# Patient Record
Sex: MALE | Race: WHITE | Employment: FULL TIME | URBAN - METROPOLITAN AREA
[De-identification: names, ages, dates, MRNs, and addresses within clinical notes are randomized per-mention and may not be internally consistent; named-entity substitution may affect disease eponyms.]

---

## 2019-02-04 ENCOUNTER — HOSPITAL ENCOUNTER (OUTPATIENT)
Dept: PREADMISSION TESTING | Age: 64
Discharge: HOME | End: 2019-02-04
Payer: COMMERCIAL

## 2019-02-04 ENCOUNTER — HOSPITAL ENCOUNTER (OUTPATIENT)
Dept: GENERAL RADIOLOGY | Age: 64
Discharge: HOME | End: 2019-02-04
Attending: ORTHOPAEDIC SURGERY
Payer: COMMERCIAL

## 2019-02-04 ENCOUNTER — HOSPITAL ENCOUNTER (OUTPATIENT)
Dept: PHYSICAL THERAPY | Age: 64
Discharge: HOME | End: 2019-02-04
Payer: COMMERCIAL

## 2019-02-04 VITALS
BODY MASS INDEX: 29.41 KG/M2 | WEIGHT: 183 LBS | SYSTOLIC BLOOD PRESSURE: 147 MMHG | DIASTOLIC BLOOD PRESSURE: 83 MMHG | RESPIRATION RATE: 18 BRPM | HEIGHT: 66 IN | TEMPERATURE: 98.2 F | HEART RATE: 81 BPM | OXYGEN SATURATION: 96 %

## 2019-02-04 LAB
ABO + RH BLD: NORMAL
ALBUMIN SERPL-MCNC: 4 G/DL (ref 3.5–5)
ALBUMIN/GLOB SERPL: 0.9 {RATIO} (ref 1.1–2.2)
ALP SERPL-CCNC: 116 U/L (ref 45–117)
ALT SERPL-CCNC: 19 U/L (ref 12–78)
ANION GAP SERPL CALC-SCNC: 6 MMOL/L (ref 5–15)
APPEARANCE UR: CLEAR
AST SERPL-CCNC: 13 U/L (ref 15–37)
ATRIAL RATE: 73 BPM
BACTERIA URNS QL MICRO: NEGATIVE /HPF
BILIRUB SERPL-MCNC: 0.5 MG/DL (ref 0.2–1)
BILIRUB UR QL: NEGATIVE
BLOOD GROUP ANTIBODIES SERPL: NORMAL
BUN SERPL-MCNC: 21 MG/DL (ref 6–20)
BUN/CREAT SERPL: 19 (ref 12–20)
CALCIUM SERPL-MCNC: 9.1 MG/DL (ref 8.5–10.1)
CALCULATED P AXIS, ECG09: 53 DEGREES
CALCULATED R AXIS, ECG10: 24 DEGREES
CALCULATED T AXIS, ECG11: 34 DEGREES
CHLORIDE SERPL-SCNC: 105 MMOL/L (ref 97–108)
CO2 SERPL-SCNC: 24 MMOL/L (ref 21–32)
COLOR UR: NORMAL
CREAT SERPL-MCNC: 1.11 MG/DL (ref 0.7–1.3)
DIAGNOSIS, 93000: NORMAL
EPITH CASTS URNS QL MICRO: NORMAL /LPF
ERYTHROCYTE [DISTWIDTH] IN BLOOD BY AUTOMATED COUNT: 15.8 % (ref 11.5–14.5)
EST. AVERAGE GLUCOSE BLD GHB EST-MCNC: NORMAL MG/DL
GLOBULIN SER CALC-MCNC: 4.4 G/DL (ref 2–4)
GLUCOSE SERPL-MCNC: 90 MG/DL (ref 65–100)
GLUCOSE UR STRIP.AUTO-MCNC: NEGATIVE MG/DL
HBA1C MFR BLD: 4.8 % (ref 4.2–6.3)
HCT VFR BLD AUTO: 39.3 % (ref 36.6–50.3)
HGB BLD-MCNC: 13.2 G/DL (ref 12.1–17)
HGB UR QL STRIP: NEGATIVE
HYALINE CASTS URNS QL MICRO: NORMAL /LPF (ref 0–5)
INR PPP: 1 (ref 0.9–1.1)
KETONES UR QL STRIP.AUTO: NEGATIVE MG/DL
LEUKOCYTE ESTERASE UR QL STRIP.AUTO: NEGATIVE
MCH RBC QN AUTO: 34.4 PG (ref 26–34)
MCHC RBC AUTO-ENTMCNC: 33.6 G/DL (ref 30–36.5)
MCV RBC AUTO: 102.3 FL (ref 80–99)
NITRITE UR QL STRIP.AUTO: NEGATIVE
NRBC # BLD: 0 K/UL (ref 0–0.01)
NRBC BLD-RTO: 0 PER 100 WBC
P-R INTERVAL, ECG05: 166 MS
PH UR STRIP: 6 [PH] (ref 5–8)
PLATELET # BLD AUTO: 165 K/UL (ref 150–400)
PMV BLD AUTO: 10.8 FL (ref 8.9–12.9)
POTASSIUM SERPL-SCNC: 4.5 MMOL/L (ref 3.5–5.1)
PROT SERPL-MCNC: 8.4 G/DL (ref 6.4–8.2)
PROT UR STRIP-MCNC: NEGATIVE MG/DL
PROTHROMBIN TIME: 10.3 SEC (ref 9–11.1)
Q-T INTERVAL, ECG07: 358 MS
QRS DURATION, ECG06: 90 MS
QTC CALCULATION (BEZET), ECG08: 394 MS
RBC # BLD AUTO: 3.84 M/UL (ref 4.1–5.7)
RBC #/AREA URNS HPF: NORMAL /HPF (ref 0–5)
SODIUM SERPL-SCNC: 135 MMOL/L (ref 136–145)
SP GR UR REFRACTOMETRY: 1.02 (ref 1–1.03)
SPECIMEN EXP DATE BLD: NORMAL
UA: UC IF INDICATED,UAUC: NORMAL
UROBILINOGEN UR QL STRIP.AUTO: 0.2 EU/DL (ref 0.2–1)
VENTRICULAR RATE, ECG03: 73 BPM
WBC # BLD AUTO: 5.8 K/UL (ref 4.1–11.1)
WBC URNS QL MICRO: NORMAL /HPF (ref 0–4)

## 2019-02-04 PROCEDURE — 97161 PT EVAL LOW COMPLEX 20 MIN: CPT

## 2019-02-04 PROCEDURE — 85610 PROTHROMBIN TIME: CPT

## 2019-02-04 PROCEDURE — 81001 URINALYSIS AUTO W/SCOPE: CPT

## 2019-02-04 PROCEDURE — 71046 X-RAY EXAM CHEST 2 VIEWS: CPT

## 2019-02-04 PROCEDURE — 85027 COMPLETE CBC AUTOMATED: CPT

## 2019-02-04 PROCEDURE — 97110 THERAPEUTIC EXERCISES: CPT

## 2019-02-04 PROCEDURE — 36415 COLL VENOUS BLD VENIPUNCTURE: CPT

## 2019-02-04 PROCEDURE — 80053 COMPREHEN METABOLIC PANEL: CPT

## 2019-02-04 PROCEDURE — 93005 ELECTROCARDIOGRAM TRACING: CPT

## 2019-02-04 PROCEDURE — 86900 BLOOD TYPING SEROLOGIC ABO: CPT

## 2019-02-04 PROCEDURE — 83036 HEMOGLOBIN GLYCOSYLATED A1C: CPT

## 2019-02-04 RX ORDER — ASPIRIN 81 MG/1
81 TABLET ORAL DAILY
COMMUNITY
End: 2019-02-12

## 2019-02-04 RX ORDER — ATORVASTATIN CALCIUM 20 MG/1
20 TABLET, FILM COATED ORAL DAILY
COMMUNITY

## 2019-02-04 RX ORDER — MELOXICAM 15 MG/1
15 TABLET ORAL AS NEEDED
COMMUNITY
End: 2019-02-12

## 2019-02-04 RX ORDER — BISMUTH SUBSALICYLATE 262 MG
1 TABLET,CHEWABLE ORAL DAILY
COMMUNITY
End: 2020-10-08

## 2019-02-04 RX ORDER — LISINOPRIL AND HYDROCHLOROTHIAZIDE 12.5; 2 MG/1; MG/1
1 TABLET ORAL 2 TIMES DAILY
COMMUNITY

## 2019-02-04 NOTE — PERIOP NOTES
Incentive Eugenie Cox Using the incentive spirometer helps expand the small air sacs of your lungs, helps you breathe deeply, and helps improve your lung function. Use your incentive spirometer twice a day (10 breaths each time) prior to surgery. How to Use Your Incentive Spirometer: 1. Hold the incentive spirometer in an upright position. 2. Breathe out as usual.  
3. Place the mouthpiece in your mouth and seal your lips tightly around it. 4. Take a deep breath. Breathe in slowly and as deeply as possible. Keep the blue flow rate guide between the arrows. 5. Hold your breath as long as possible. Then exhale slowly and allow the piston to fall to the bottom of the column. 6. Rest for a few seconds and repeat steps one through five at least 10 times. PAT Tidal Volume_________2250_________  x________2________  Date___02/04/19____________________ BRING THE INCENTIVE SPIROMETER WITH YOU TO THE HOSPITAL ON THE DAY OF YOUR SURGERY. Opportunity given to ask and answer questions as well as to observe return demonstration. Patient signature_____________________________    Witness____________________________

## 2019-02-04 NOTE — PERIOP NOTES
Preventing Infections Before and After  Your SurgeryIMPORTANT INSTRUCTIONSPlease read and follow these instructions carefully. If you are unable to comply with the below instructions your procedure will be cancelled. Every Night for Three (3) nights before your surgery: 1. Shower with an antibacterial soap, such as Dial, or the soap provided at your preassessment appointment. A shower is better than a bath for cleaning your skin. 2. If needed, ask someone to help you reach all areas of your body. Dont forget to clean your belly button with every shower. The night before your surgery: If you lose your Hibiclens please contact surgery center or you can purchase it at a local pharmacy 1. On the night before your surgery, shower with an antibacterial soap, such as Dial, or the soap provided at your preassessment appointment. 2. With one packet of Hibiclens in hand, turn water off. 
3. Apply Hibiclens antiseptic skin cleanser with a clean, freshly washed washcloth. ? Gently apply to your body from chin to toes (except the genital area) and especially the area(s) where your incision(s) will be. ? Leave Hibiclens on your skin for at least 20 seconds. CAUTION: If needed, Hibiclens may be used to clean the folds of skin of the legs (such as in the area of the groin) and on your buttocks and hips. However, do not use Hibiclens above the neck or in the genital area (your bottom) or put inside any area of your body. 4. Turn the water back on and rinse. 5. Dry gently with a clean, freshly washed towel. 6. After your shower, do not use any powder, deodorant, perfumes or lotion. 7. Use clean, freshly washed towels and washcloths every time you shower. 8. Wear clean, freshly washed pajamas to bed the night before surgery. 9. Sleep on clean, freshly washed sheets. 10. Do not allow pets to sleep in your bed with you. The Morning of your surgery: 1. Shower again thoroughly with an antibacterial soap, such as Dial or the soap provided at your preassessment appointment. If needed, ask someone for help to reach all areas of your body. Dont forget to clean your belly button! Rinse. 2. Dry gently with a clean, freshly washed towel. 3. After your shower, do not use any powder, deodorant, perfumes or lotion prior to surgery. 4. Put on clean, freshly washed clothing. Tips to help prevent infections after your surgery: 1. Protect your surgical wound from germs: 
? Hand washing is the most important thing you and your caregivers can do to prevent infections. ? Keep your bandage clean and dry! ? Do not touch your surgical wound. 2. Use clean, freshly washed towels and washcloths every time you shower; do not share bath linens with others. 3. Until your surgical wound is healed, wear clothing and sleep on bed linens each day that are clean and freshly washed. 4. Do not allow pets to sleep in your bed with you or touch your surgical wound. 5. Do not smoke  smoking delays wound healing. This may be a good time to stop smoking. 6. If you have diabetes, it is important for you to manage your blood sugar levels properly before your surgery as well as after your surgery. Poorly managed blood sugar levels slow down wound healing and prevent you from healing completely. If you lose your Hibiclens, please call the [facility], or it is available for purchase at your pharmacy. ___________________      ___________________      ________________ 
(Signature of Patient)          (Witness)                   (Date and Time)

## 2019-02-04 NOTE — PROGRESS NOTES
[facility] Physical Therapy Pre-surgery evaluation [address] [place], [address] [facility] physical Therapy pre THR surgery EVALUATION Patient: Patrick Blair (19 y.o. male) Date: 2/4/2019 Primary Diagnosis: rt hip Precautions:     
 
ASSESSMENT : 
Based on the objective data described below, the patient presents with impaired gait, balance, pain, and overall high level functional mobility due to end stage degenerative joint disease in the right hip. Discussed anticipated disposition to home with possible discharge within a 1 to 2 day time frame post-surgery. Patient and  in agreement. Daughter present for prehab  today, but patient lives with son and has good family support. GOALS: (Goals have been discussed and agreed upon with patient.) DISCHARGE GOALS: Time Frame: 1 DAY 1. Patient will demonstrate increased strength, range of motion, and pain control via a home exercise program in order to minimize functional deficits in preparation for their upcoming surgery. This will be achieved by using education, demonstration and through the use of an informational handout including a home exercise program. 
REHABILITATION POTENTIAL FOR STATED GOALS: Good RECOMMENDATIONS AND PLANNED INTERVENTIONS: (Benefits and precautions of physical therapy have been discussed with the patient.) 1. Home Exercise Program 
TREATMENT PLAN EFFECTIVE DATES: 2/4/2019 TO 2/4/2019 FREQUENCY/DURATION: Patient to continue to perform home exercise program at least twice daily until his surgery. SUBJECTIVE:  
Patient stated I can't wait for this to be better.  OBJECTIVE DATA SUMMARY:  
HISTORY:   
Past Medical History:  
Diagnosis Date  Arthritis  CAD (coronary artery disease) lipids  Diabetes ([address]  Hypertension  Infectious disease Past Surgical History:  
Procedure Laterality Date  HX TONSILLECTOMY Prior Level of Function/Home Situation: Patient lives with son in single story home with 3 steps to enter, no rail. No history of falls and works full time as a , Domenico Vargas in community without [address] and driving. Personal factors and/or comorbidities impacting plan of care: na 
 
Patient []   does  [x]   does not state signs/symptoms of shortness of breath/dyspnea on exertion/respiratory distress. Home Situation Home Environment: Private residence # Steps to Enter: 3 Rails to Enter: No 
One/Two Story Residence: One story Living Alone: No 
Support Systems: Family member(s) Patient Expects to be Discharged to[de-identified] Private residence Current DME Used/Available at Home: None Tub or Shower Type: Shower EXAMINATION/PRESENTATION/DECISION MAKING:  
 
ADLs (Current Functional Status): Bathing/Showering:  
[x] Independent 
[] Requires Assistance from Someone 
[] Sponge Bath Only Ambulation: 
[x] Independent 
[] Walk Indoors Only 
[] Walk Outdoors [] Use Assistive Device [] Use Wheelchair Only Dressing: 
[x] Independent Requires Assistance from Someone for: 
[] Sock/Shoes 
[] Pants 
[] Everything Household Activities: 
[x] Routine house and yard work 
[] Light Housework Only 
[] None Critical Behavior: 
  
  
  
  
 
Strength:   
Strength: Within functional limits Tone & Sensation:  
Tone: Normal 
  
  
  
  
Sensation: Intact Range Of Motion: 
AROM: Within functional limits PROM: Within functional limits Coordination: 
Coordination: Within functional limits Functional Mobility: 
Transfers: 
Sit to Stand: Independent Stand to Sit: Independent Balance:  
Sitting: Intact Standing: Intact Ambulation/Gait Training: 
Distance (ft): 40 Feet (ft) Ambulation - Level of Assistance: Independent Gait Abnormalities: Antalgic Base of Support: Widened Speed/Rayne: Pace decreased (<100 feet/min) Therapeutic Exercises:  
The patient was educated in, has demonstrated, and has received written instructions to complete for their home exercise program per total hip replacement protocol. Functional Measure: 
Lower Extremity Functional Scale (LEFS): 
 
Score 51/80 Percentage of impairment CH 
0% CI 
1-19% CJ 
20-39% CK 
40-59% CL 
60-79% CM 
80-99% CN 
100% LEFS score: 
0-80 80 64-79 47-63 31-46 16-30 1-15 0 Cutt-offs: None establishedTKA and STARLA:  
(Antolin et al, 2000) Reyna Nowak [address] = 9 points MCID = 9 points Pain: 
Pain Scale 1: Numeric (0 - 10) Pain Intensity 1: 0(no pain sitting/pain with walking) Activity Tolerance:  
good Patient []   does  [x]   does not demonstrate signs/symptoms of shortness of breath/dyspnea on exertion/respiratory distress. COMMUNICATION/EDUCATION:  
The patient was educated on: 
[x]         Early post operative mobility is imperative to achieve a patient's desired outcomes and to restore biological function. [x]         Post operative hip precautions may/may not be applicable. These precautions are based on the patient's physician and the procedure(s) performed. [x]         Anterior hip precautions including: ·       No hip extension ·       No external rotation ·       No crossing of the legs/midline 
 
[]         Posterior hip precautions including: ·       No hip flexion >90 degrees ·       No internal rotation ·       No crossing of the legs/midline The patients plan of care was discussed as follows:  
[x]         The patient verbalized understanding of his plan in preparation for their upcoming surgery 
[x]         The patient's  was present for this session 
[]        The patient reports that he/she does not have a  identified at this time 
[x]         The  verbalized understanding of the education regarding the patient's upcoming surgery [x]         Patient/family agree to work toward stated goals and plan of care. []         Patient understands intent and goals of therapy, but is neutral about his/her participation. []         Patient is unable to participate in goal setting and plan of care. Thank you for this referral. 
Sky Roach Time Calculation: 20 mins

## 2019-02-05 LAB
BACTERIA SPEC CULT: NORMAL
BACTERIA SPEC CULT: NORMAL
SERVICE CMNT-IMP: NORMAL

## 2019-02-08 NOTE — H&P
Dominick Martinez MD - Adult Reconstruction and Total Joint Replacement Orthopaedic History and Physical 
    NAME: Nahid Lima :  1955 MRN:  [id number] Subjective:  
Patient ID: Yvonne Gonzalez is a 59 y.o. male. 
  
Chief Complaint: Pain of the Right Hip 
  
He endorses about one year of right lateral hip pain which does not radiate down the leg. Pain is exacerbated by walking while carrying weight. No complaints of the contralateral side. He denies any cardiac, pulmonary or kidney issues. He smokes one pack per day. 
  
 
There are no active problems to display for this patient. Past Medical History:  
Diagnosis Date  Arthritis  CAD (coronary artery disease) lipids  Chronic obstructive pulmonary disease ([facility]  Chronic pain  Diabetes ([facility]  Hypertension  Ill-defined condition  Infectious disease Past Surgical History:  
Procedure Laterality Date  HX TONSILLECTOMY Prior to Admission medications Medication Sig Start Date End Date Taking? Authorizing Provider  
aspirin delayed-release 81 mg tablet Take 81 mg by mouth daily. Yes Provider, Historical  
multivitamin (ONE A DAY) tablet Take 1 Tab by mouth daily. Yes Provider, Historical  
lisinopril-hydroCHLOROthiazide (PRINZIDE, ZESTORETIC) 20-12.5 mg per tablet Take 1 Tab by mouth two (2) times a day. Yes Provider, Historical  
atorvastatin (LIPITOR) 20 mg tablet Take 20 mg by mouth daily. Yes Provider, Historical  
meloxicam (MOBIC) 15 mg tablet Take 15 mg by mouth as needed for Pain. Yes Provider, Historical  
docosahexanoic acid/epa (FISH OIL PO) Take 1,000 mg by mouth daily. Yes Provider, Historical  
gemfibrozil (LOPID) 600 mg tablet Take  by mouth two (2) times a day. Other, MD Patrice  
METFORMIN HCL (METFORMIN PO) Take 1,000 mg by mouth two (2) times a day. Patrice Weathers MD  
 
No Known Allergies Social History Tobacco Use  
  Smoking status: Current Every Day Smoker Packs/day: 1.50 Years: 30.00 Pack years: 45.00  Smokeless tobacco: Never Used  Tobacco comment: 2 ppd for years/cutting back Substance Use Topics  Alcohol use: Yes Alcohol/week: 2.4 oz Types: 4 Cans of beer per week Comment: weekends Family History Problem Relation Age of Onset  Heart Disease Mother  Heart Disease Father  Alzheimer Father  Heart Disease Brother REVIEW OF SYSTEMS: A comprehensive review of systems was negative except for that written in the HPI. Objective:  
Constitutional: He appears stated age. Pt is cooperative and is in no acute distress. Well nourished. Well developed. Body habitus is obese. Body mass index is 30.18 kg/m². Assistive devices: none. Gait is antalgic around the right hip with noticeable leg length discrepancy, right leg shorter then left. Eyes:  Sclera are nonicteric. Respiratory:  No labored breathing. Cardiovascular:  No marked edema. Well perfused extremities bilaterally. Skin:  No marked skin ulcers. No lymphedema or skin abnormalities. Neurological:  No marked sensory loss noted. Grossly neurovascularly intact. Both lower extremities are intact to distal sensory and motor function. Psychiatric: Alert and oriented x3. 
  
MUSCULOSKELETAL: Lumbar spine is nonfocal. Short on RLE. 5/5 BLE strength. No right trochanteric TTP. No internal rotation of the right hip. Painful attempted ROM. Fixed ER and slight flexion of hip. 
  
Radiographs:   
  
I ordered no new films in the office today. R hip films from outside facility reveal bone on bone articulation, significant erosion of the femoral head, migration of the acetabulum in a superior direction and considerable shortening of leg length. The left hip has spherical femoral head and acetabulum with good joint space preservation. 
  
 Assessment:  
  
    ICD-10-CM 1. Primary osteoarthritis of right hip M16.11  
2. Obesity (BMI 30-39. 9) E66.9  
   
  
 Plan: At this time, I recommended R STARLA. Due to the superior migration of the acetabulum we will leave the femoral head, place a small frag screw to ream into it and use it as the new augment behind the acetabular component. Conservative treatments including activity modification, medication, and steroid injections have not successfully relieved pain. Surgery was discussed at length with the pt today. We went over all the pertinent risks, benefits, and alternatives to the procedure. They understand no guarantees can be made about the outcome and they would like to proceed. I discussed the pre-op total joint class and general recovery timeline with the pt. The pt understands the need for medical clearance. The patient understands the need for smoking reduction/cessation.  We will plan for the R STARLA in 3-6 weeks.  
  
   
  
Scribed for Noe Hess PA-C by Wally Peer Lott Hatchet, PA

## 2019-02-08 NOTE — PERIOP NOTES
Preop phone call made and TOA given. Patient instructed that they may have up to [facility] of water after midnight until 0400, then NPO. Patient verbalized understanding.

## 2019-02-11 ENCOUNTER — HOSPITAL ENCOUNTER (INPATIENT)
Age: 64
LOS: 1 days | Discharge: HOME HEALTH | DRG: 470 | End: 2019-02-12
Attending: ORTHOPAEDIC SURGERY | Admitting: ORTHOPAEDIC SURGERY
Payer: COMMERCIAL

## 2019-02-11 ENCOUNTER — ANESTHESIA (OUTPATIENT)
Dept: SURGERY | Age: 64
DRG: 470 | End: 2019-02-11
Payer: COMMERCIAL

## 2019-02-11 ENCOUNTER — APPOINTMENT (OUTPATIENT)
Dept: GENERAL RADIOLOGY | Age: 64
DRG: 470 | End: 2019-02-11
Attending: ORTHOPAEDIC SURGERY
Payer: COMMERCIAL

## 2019-02-11 ENCOUNTER — ANESTHESIA EVENT (OUTPATIENT)
Dept: SURGERY | Age: 64
DRG: 470 | End: 2019-02-11
Payer: COMMERCIAL

## 2019-02-11 DIAGNOSIS — Z96.641 STATUS POST RIGHT HIP REPLACEMENT: Primary | ICD-10-CM

## 2019-02-11 PROBLEM — Z96.649 S/P HIP REPLACEMENT: Status: ACTIVE | Noted: 2019-02-11

## 2019-02-11 LAB
GLUCOSE BLD STRIP.AUTO-MCNC: 102 MG/DL (ref 65–100)
GLUCOSE BLD STRIP.AUTO-MCNC: 117 MG/DL (ref 65–100)
GLUCOSE BLD STRIP.AUTO-MCNC: 126 MG/DL (ref 65–100)
GLUCOSE BLD STRIP.AUTO-MCNC: 145 MG/DL (ref 65–100)
SERVICE CMNT-IMP: ABNORMAL

## 2019-02-11 PROCEDURE — 74011250637 HC RX REV CODE- 250/637: Performed by: ORTHOPAEDIC SURGERY

## 2019-02-11 PROCEDURE — 77030031139 HC SUT VCRL2 J&J -A: Performed by: ORTHOPAEDIC SURGERY

## 2019-02-11 PROCEDURE — 65270000029 HC RM PRIVATE

## 2019-02-11 PROCEDURE — 76010000162 HC OR TIME 1.5 TO 2 HR INTENSV-TIER 1: Performed by: ORTHOPAEDIC SURGERY

## 2019-02-11 PROCEDURE — 97161 PT EVAL LOW COMPLEX 20 MIN: CPT

## 2019-02-11 PROCEDURE — 77030036722: Performed by: ORTHOPAEDIC SURGERY

## 2019-02-11 PROCEDURE — 77030014647 HC SEAL FBRN TISSL BAXT -D: Performed by: ORTHOPAEDIC SURGERY

## 2019-02-11 PROCEDURE — 77030018723 HC ELCTRD BLD COVD -A: Performed by: ORTHOPAEDIC SURGERY

## 2019-02-11 PROCEDURE — 77030032490 HC SLV COMPR SCD KNE COVD -B: Performed by: ORTHOPAEDIC SURGERY

## 2019-02-11 PROCEDURE — 77030018846 HC SOL IRR STRL H20 ICUM -A: Performed by: ORTHOPAEDIC SURGERY

## 2019-02-11 PROCEDURE — 77030039267 HC ADH SKN EXOFIN S2SG -B: Performed by: ORTHOPAEDIC SURGERY

## 2019-02-11 PROCEDURE — C1769 GUIDE WIRE: HCPCS | Performed by: ORTHOPAEDIC SURGERY

## 2019-02-11 PROCEDURE — 77030011640 HC PAD GRND REM COVD -A: Performed by: ORTHOPAEDIC SURGERY

## 2019-02-11 PROCEDURE — 97535 SELF CARE MNGMENT TRAINING: CPT

## 2019-02-11 PROCEDURE — 74011250636 HC RX REV CODE- 250/636: Performed by: ANESTHESIOLOGY

## 2019-02-11 PROCEDURE — 97165 OT EVAL LOW COMPLEX 30 MIN: CPT

## 2019-02-11 PROCEDURE — 77030003666 HC NDL SPINAL BD -A: Performed by: ORTHOPAEDIC SURGERY

## 2019-02-11 PROCEDURE — 74011250636 HC RX REV CODE- 250/636

## 2019-02-11 PROCEDURE — 77030018836 HC SOL IRR NACL ICUM -A: Performed by: ORTHOPAEDIC SURGERY

## 2019-02-11 PROCEDURE — 0SR904A REPLACEMENT OF RIGHT HIP JOINT WITH CERAMIC ON POLYETHYLENE SYNTHETIC SUBSTITUTE, UNCEMENTED, OPEN APPROACH: ICD-10-PCS | Performed by: ORTHOPAEDIC SURGERY

## 2019-02-11 PROCEDURE — 82962 GLUCOSE BLOOD TEST: CPT

## 2019-02-11 PROCEDURE — 74011000250 HC RX REV CODE- 250

## 2019-02-11 PROCEDURE — 77030020782 HC GWN BAIR PAWS FLX 3M -B

## 2019-02-11 PROCEDURE — 97530 THERAPEUTIC ACTIVITIES: CPT

## 2019-02-11 PROCEDURE — 90471 IMMUNIZATION ADMIN: CPT

## 2019-02-11 PROCEDURE — 74011250637 HC RX REV CODE- 250/637

## 2019-02-11 PROCEDURE — 77030019908 HC STETH ESOPH SIMS -A: Performed by: NURSE ANESTHETIST, CERTIFIED REGISTERED

## 2019-02-11 PROCEDURE — 74011250636 HC RX REV CODE- 250/636: Performed by: ORTHOPAEDIC SURGERY

## 2019-02-11 PROCEDURE — C1776 JOINT DEVICE (IMPLANTABLE): HCPCS | Performed by: ORTHOPAEDIC SURGERY

## 2019-02-11 PROCEDURE — 77030022704 HC SUT VLOC COVD -B: Performed by: ORTHOPAEDIC SURGERY

## 2019-02-11 PROCEDURE — 77030013079 HC BLNKT BAIR HGGR 3M -A: Performed by: NURSE ANESTHETIST, CERTIFIED REGISTERED

## 2019-02-11 PROCEDURE — 76210000016 HC OR PH I REC 1 TO 1.5 HR: Performed by: ORTHOPAEDIC SURGERY

## 2019-02-11 PROCEDURE — 74011250636 HC RX REV CODE- 250/636: Performed by: PHYSICIAN ASSISTANT

## 2019-02-11 PROCEDURE — 77030035643 HC BLD SAW OSC PRECIS STRY -C: Performed by: ORTHOPAEDIC SURGERY

## 2019-02-11 PROCEDURE — 73501 X-RAY EXAM HIP UNI 1 VIEW: CPT

## 2019-02-11 PROCEDURE — 77030033138 HC SUT PGA STRATFX J&J -B: Performed by: ORTHOPAEDIC SURGERY

## 2019-02-11 PROCEDURE — 74011000258 HC RX REV CODE- 258

## 2019-02-11 PROCEDURE — 97116 GAIT TRAINING THERAPY: CPT

## 2019-02-11 PROCEDURE — 74011000258 HC RX REV CODE- 258: Performed by: ORTHOPAEDIC SURGERY

## 2019-02-11 PROCEDURE — 77030035236 HC SUT PDS STRATFX BARB J&J -B: Performed by: ORTHOPAEDIC SURGERY

## 2019-02-11 PROCEDURE — 77030036660

## 2019-02-11 PROCEDURE — 76000 FLUOROSCOPY <1 HR PHYS/QHP: CPT

## 2019-02-11 PROCEDURE — 74011250637 HC RX REV CODE- 250/637: Performed by: PHYSICIAN ASSISTANT

## 2019-02-11 PROCEDURE — 74011000250 HC RX REV CODE- 250: Performed by: PHYSICIAN ASSISTANT

## 2019-02-11 PROCEDURE — 77030026438 HC STYL ET INTUB CARD -A: Performed by: NURSE ANESTHETIST, CERTIFIED REGISTERED

## 2019-02-11 PROCEDURE — 77030020788: Performed by: ORTHOPAEDIC SURGERY

## 2019-02-11 PROCEDURE — 90686 IIV4 VACC NO PRSV 0.5 ML IM: CPT | Performed by: ORTHOPAEDIC SURGERY

## 2019-02-11 PROCEDURE — 77030008684 HC TU ET CUF COVD -B: Performed by: NURSE ANESTHETIST, CERTIFIED REGISTERED

## 2019-02-11 PROCEDURE — 76060000034 HC ANESTHESIA 1.5 TO 2 HR: Performed by: ORTHOPAEDIC SURGERY

## 2019-02-11 DEVICE — IMPLANTABLE DEVICE
Type: IMPLANTABLE DEVICE | Site: HIP | Status: FUNCTIONAL
Brand: NOVATION

## 2019-02-11 DEVICE — IMPLANTABLE DEVICE
Type: IMPLANTABLE DEVICE | Site: HIP | Status: NON-FUNCTIONAL
Brand: EXACTECH
Removed: 2019-02-21

## 2019-02-11 DEVICE — IMPLANTABLE DEVICE
Type: IMPLANTABLE DEVICE | Site: HIP | Status: NON-FUNCTIONAL
Brand: NOVATION
Removed: 2019-02-21

## 2019-02-11 DEVICE — COMPONENT HIP PRSS FT CERM ON POLYETH [EXACBSVH1COP] [EXACTECH INC]: Type: IMPLANTABLE DEVICE | Status: FUNCTIONAL

## 2019-02-11 RX ORDER — TRANEXAMIC ACID 100 MG/ML
INJECTION, SOLUTION INTRAVENOUS
Status: DISPENSED
Start: 2019-02-11 | End: 2019-02-11

## 2019-02-11 RX ORDER — DEXTROSE 50 % IN WATER (D50W) INTRAVENOUS SYRINGE
12.5-25 AS NEEDED
Status: DISCONTINUED | OUTPATIENT
Start: 2019-02-11 | End: 2019-02-12 | Stop reason: HOSPADM

## 2019-02-11 RX ORDER — ONDANSETRON 2 MG/ML
4 INJECTION INTRAMUSCULAR; INTRAVENOUS AS NEEDED
Status: DISCONTINUED | OUTPATIENT
Start: 2019-02-11 | End: 2019-02-11 | Stop reason: HOSPADM

## 2019-02-11 RX ORDER — LIDOCAINE HYDROCHLORIDE 20 MG/ML
INJECTION, SOLUTION EPIDURAL; INFILTRATION; INTRACAUDAL; PERINEURAL AS NEEDED
Status: DISCONTINUED | OUTPATIENT
Start: 2019-02-11 | End: 2019-02-11 | Stop reason: HOSPADM

## 2019-02-11 RX ORDER — SODIUM CHLORIDE 0.9 % (FLUSH) 0.9 %
5-40 SYRINGE (ML) INJECTION AS NEEDED
Status: DISCONTINUED | OUTPATIENT
Start: 2019-02-11 | End: 2019-02-11 | Stop reason: HOSPADM

## 2019-02-11 RX ORDER — DEXAMETHASONE SODIUM PHOSPHATE 4 MG/ML
10 INJECTION, SOLUTION INTRA-ARTICULAR; INTRALESIONAL; INTRAMUSCULAR; INTRAVENOUS; SOFT TISSUE ONCE
Status: COMPLETED | OUTPATIENT
Start: 2019-02-12 | End: 2019-02-12

## 2019-02-11 RX ORDER — NALOXONE HYDROCHLORIDE 0.4 MG/ML
0.4 INJECTION, SOLUTION INTRAMUSCULAR; INTRAVENOUS; SUBCUTANEOUS AS NEEDED
Status: DISCONTINUED | OUTPATIENT
Start: 2019-02-11 | End: 2019-02-12 | Stop reason: HOSPADM

## 2019-02-11 RX ORDER — INSULIN LISPRO 100 [IU]/ML
INJECTION, SOLUTION INTRAVENOUS; SUBCUTANEOUS
Status: DISCONTINUED | OUTPATIENT
Start: 2019-02-11 | End: 2019-02-12 | Stop reason: HOSPADM

## 2019-02-11 RX ORDER — FENTANYL CITRATE 50 UG/ML
50 INJECTION, SOLUTION INTRAMUSCULAR; INTRAVENOUS AS NEEDED
Status: DISCONTINUED | OUTPATIENT
Start: 2019-02-11 | End: 2019-02-11 | Stop reason: HOSPADM

## 2019-02-11 RX ORDER — MIDAZOLAM HYDROCHLORIDE 1 MG/ML
INJECTION, SOLUTION INTRAMUSCULAR; INTRAVENOUS AS NEEDED
Status: DISCONTINUED | OUTPATIENT
Start: 2019-02-11 | End: 2019-02-11 | Stop reason: HOSPADM

## 2019-02-11 RX ORDER — DIPHENHYDRAMINE HYDROCHLORIDE 50 MG/ML
12.5 INJECTION, SOLUTION INTRAMUSCULAR; INTRAVENOUS AS NEEDED
Status: DISCONTINUED | OUTPATIENT
Start: 2019-02-11 | End: 2019-02-11 | Stop reason: HOSPADM

## 2019-02-11 RX ORDER — OXYCODONE HYDROCHLORIDE 5 MG/1
10 TABLET ORAL
Status: DISCONTINUED | OUTPATIENT
Start: 2019-02-11 | End: 2019-02-12 | Stop reason: HOSPADM

## 2019-02-11 RX ORDER — ROCURONIUM BROMIDE 10 MG/ML
INJECTION, SOLUTION INTRAVENOUS AS NEEDED
Status: DISCONTINUED | OUTPATIENT
Start: 2019-02-11 | End: 2019-02-11 | Stop reason: HOSPADM

## 2019-02-11 RX ORDER — ONDANSETRON 2 MG/ML
4 INJECTION INTRAMUSCULAR; INTRAVENOUS
Status: ACTIVE | OUTPATIENT
Start: 2019-02-11 | End: 2019-02-12

## 2019-02-11 RX ORDER — PROPOFOL 10 MG/ML
INJECTION, EMULSION INTRAVENOUS AS NEEDED
Status: DISCONTINUED | OUTPATIENT
Start: 2019-02-11 | End: 2019-02-11 | Stop reason: HOSPADM

## 2019-02-11 RX ORDER — AMOXICILLIN 250 MG
1 CAPSULE ORAL 2 TIMES DAILY
Status: DISCONTINUED | OUTPATIENT
Start: 2019-02-11 | End: 2019-02-12 | Stop reason: HOSPADM

## 2019-02-11 RX ORDER — ACETAMINOPHEN 500 MG
1000 TABLET ORAL ONCE
Status: COMPLETED | OUTPATIENT
Start: 2019-02-11 | End: 2019-02-11

## 2019-02-11 RX ORDER — SODIUM CHLORIDE, SODIUM LACTATE, POTASSIUM CHLORIDE, CALCIUM CHLORIDE 600; 310; 30; 20 MG/100ML; MG/100ML; MG/100ML; MG/100ML
100 INJECTION, SOLUTION INTRAVENOUS CONTINUOUS
Status: DISCONTINUED | OUTPATIENT
Start: 2019-02-11 | End: 2019-02-11 | Stop reason: HOSPADM

## 2019-02-11 RX ORDER — MIDAZOLAM HYDROCHLORIDE 1 MG/ML
0.5 INJECTION, SOLUTION INTRAMUSCULAR; INTRAVENOUS
Status: DISCONTINUED | OUTPATIENT
Start: 2019-02-11 | End: 2019-02-11 | Stop reason: HOSPADM

## 2019-02-11 RX ORDER — MORPHINE SULFATE 10 MG/ML
2 INJECTION, SOLUTION INTRAMUSCULAR; INTRAVENOUS
Status: DISCONTINUED | OUTPATIENT
Start: 2019-02-11 | End: 2019-02-11 | Stop reason: HOSPADM

## 2019-02-11 RX ORDER — HYDROMORPHONE HYDROCHLORIDE 1 MG/ML
0.5 INJECTION, SOLUTION INTRAMUSCULAR; INTRAVENOUS; SUBCUTANEOUS
Status: DISCONTINUED | OUTPATIENT
Start: 2019-02-11 | End: 2019-02-11 | Stop reason: HOSPADM

## 2019-02-11 RX ORDER — ACETAMINOPHEN 325 MG/1
650 TABLET ORAL EVERY 6 HOURS
Status: DISCONTINUED | OUTPATIENT
Start: 2019-02-11 | End: 2019-02-12 | Stop reason: HOSPADM

## 2019-02-11 RX ORDER — DEXAMETHASONE SODIUM PHOSPHATE 4 MG/ML
INJECTION, SOLUTION INTRA-ARTICULAR; INTRALESIONAL; INTRAMUSCULAR; INTRAVENOUS; SOFT TISSUE AS NEEDED
Status: DISCONTINUED | OUTPATIENT
Start: 2019-02-11 | End: 2019-02-11 | Stop reason: HOSPADM

## 2019-02-11 RX ORDER — NEOSTIGMINE METHYLSULFATE 1 MG/ML
INJECTION INTRAVENOUS AS NEEDED
Status: DISCONTINUED | OUTPATIENT
Start: 2019-02-11 | End: 2019-02-11 | Stop reason: HOSPADM

## 2019-02-11 RX ORDER — SODIUM CHLORIDE 0.9 % (FLUSH) 0.9 %
5-40 SYRINGE (ML) INJECTION EVERY 8 HOURS
Status: DISCONTINUED | OUTPATIENT
Start: 2019-02-11 | End: 2019-02-11 | Stop reason: HOSPADM

## 2019-02-11 RX ORDER — ROPIVACAINE HYDROCHLORIDE 5 MG/ML
30 INJECTION, SOLUTION EPIDURAL; INFILTRATION; PERINEURAL AS NEEDED
Status: DISCONTINUED | OUTPATIENT
Start: 2019-02-11 | End: 2019-02-11 | Stop reason: HOSPADM

## 2019-02-11 RX ORDER — LIDOCAINE HYDROCHLORIDE 10 MG/ML
0.1 INJECTION, SOLUTION EPIDURAL; INFILTRATION; INTRACAUDAL; PERINEURAL AS NEEDED
Status: DISCONTINUED | OUTPATIENT
Start: 2019-02-11 | End: 2019-02-11 | Stop reason: HOSPADM

## 2019-02-11 RX ORDER — GLYCOPYRROLATE 0.2 MG/ML
INJECTION INTRAMUSCULAR; INTRAVENOUS AS NEEDED
Status: DISCONTINUED | OUTPATIENT
Start: 2019-02-11 | End: 2019-02-11 | Stop reason: HOSPADM

## 2019-02-11 RX ORDER — GUAIFENESIN 100 MG/5ML
81 LIQUID (ML) ORAL 2 TIMES DAILY
Status: DISCONTINUED | OUTPATIENT
Start: 2019-02-11 | End: 2019-02-12 | Stop reason: HOSPADM

## 2019-02-11 RX ORDER — SODIUM CHLORIDE 9 MG/ML
25 INJECTION, SOLUTION INTRAVENOUS CONTINUOUS
Status: DISCONTINUED | OUTPATIENT
Start: 2019-02-11 | End: 2019-02-11 | Stop reason: HOSPADM

## 2019-02-11 RX ORDER — FAMOTIDINE 20 MG/1
20 TABLET, FILM COATED ORAL 2 TIMES DAILY
Status: DISCONTINUED | OUTPATIENT
Start: 2019-02-11 | End: 2019-02-12 | Stop reason: HOSPADM

## 2019-02-11 RX ORDER — KETOROLAC TROMETHAMINE 30 MG/ML
30 INJECTION, SOLUTION INTRAMUSCULAR; INTRAVENOUS EVERY 6 HOURS
Status: COMPLETED | OUTPATIENT
Start: 2019-02-11 | End: 2019-02-12

## 2019-02-11 RX ORDER — DIPHENHYDRAMINE HCL 12.5MG/5ML
12.5 ELIXIR ORAL
Status: DISCONTINUED | OUTPATIENT
Start: 2019-02-11 | End: 2019-02-12 | Stop reason: HOSPADM

## 2019-02-11 RX ORDER — ATORVASTATIN CALCIUM 20 MG/1
20 TABLET, FILM COATED ORAL DAILY
Status: DISCONTINUED | OUTPATIENT
Start: 2019-02-11 | End: 2019-02-12 | Stop reason: HOSPADM

## 2019-02-11 RX ORDER — SODIUM CHLORIDE 0.9 % (FLUSH) 0.9 %
5-40 SYRINGE (ML) INJECTION AS NEEDED
Status: DISCONTINUED | OUTPATIENT
Start: 2019-02-11 | End: 2019-02-12 | Stop reason: HOSPADM

## 2019-02-11 RX ORDER — OXYCODONE HYDROCHLORIDE 5 MG/1
5 TABLET ORAL
Status: DISCONTINUED | OUTPATIENT
Start: 2019-02-11 | End: 2019-02-12 | Stop reason: HOSPADM

## 2019-02-11 RX ORDER — CELECOXIB 200 MG/1
400 CAPSULE ORAL ONCE
Status: COMPLETED | OUTPATIENT
Start: 2019-02-11 | End: 2019-02-11

## 2019-02-11 RX ORDER — MIDAZOLAM HYDROCHLORIDE 1 MG/ML
1 INJECTION, SOLUTION INTRAMUSCULAR; INTRAVENOUS AS NEEDED
Status: DISCONTINUED | OUTPATIENT
Start: 2019-02-11 | End: 2019-02-11 | Stop reason: HOSPADM

## 2019-02-11 RX ORDER — SUCCINYLCHOLINE CHLORIDE 20 MG/ML
INJECTION INTRAMUSCULAR; INTRAVENOUS AS NEEDED
Status: DISCONTINUED | OUTPATIENT
Start: 2019-02-11 | End: 2019-02-11 | Stop reason: HOSPADM

## 2019-02-11 RX ORDER — SODIUM CHLORIDE 9 MG/ML
INJECTION, SOLUTION INTRAVENOUS
Status: DISPENSED
Start: 2019-02-11 | End: 2019-02-11

## 2019-02-11 RX ORDER — FENTANYL CITRATE 50 UG/ML
25 INJECTION, SOLUTION INTRAMUSCULAR; INTRAVENOUS
Status: DISCONTINUED | OUTPATIENT
Start: 2019-02-11 | End: 2019-02-11 | Stop reason: HOSPADM

## 2019-02-11 RX ORDER — FENTANYL CITRATE 50 UG/ML
INJECTION, SOLUTION INTRAMUSCULAR; INTRAVENOUS AS NEEDED
Status: DISCONTINUED | OUTPATIENT
Start: 2019-02-11 | End: 2019-02-11 | Stop reason: HOSPADM

## 2019-02-11 RX ORDER — SODIUM CHLORIDE, SODIUM LACTATE, POTASSIUM CHLORIDE, CALCIUM CHLORIDE 600; 310; 30; 20 MG/100ML; MG/100ML; MG/100ML; MG/100ML
25 INJECTION, SOLUTION INTRAVENOUS CONTINUOUS
Status: DISCONTINUED | OUTPATIENT
Start: 2019-02-11 | End: 2019-02-11 | Stop reason: HOSPADM

## 2019-02-11 RX ORDER — FACIAL-BODY WIPES
10 EACH TOPICAL DAILY PRN
Status: DISCONTINUED | OUTPATIENT
Start: 2019-02-13 | End: 2019-02-12 | Stop reason: HOSPADM

## 2019-02-11 RX ORDER — SODIUM CHLORIDE 9 MG/ML
125 INJECTION, SOLUTION INTRAVENOUS CONTINUOUS
Status: DISPENSED | OUTPATIENT
Start: 2019-02-11 | End: 2019-02-12

## 2019-02-11 RX ORDER — MORPHINE SULFATE 2 MG/ML
2 INJECTION, SOLUTION INTRAMUSCULAR; INTRAVENOUS
Status: ACTIVE | OUTPATIENT
Start: 2019-02-11 | End: 2019-02-12

## 2019-02-11 RX ORDER — HYDROMORPHONE HYDROCHLORIDE 2 MG/ML
INJECTION, SOLUTION INTRAMUSCULAR; INTRAVENOUS; SUBCUTANEOUS AS NEEDED
Status: DISCONTINUED | OUTPATIENT
Start: 2019-02-11 | End: 2019-02-11 | Stop reason: HOSPADM

## 2019-02-11 RX ORDER — SODIUM CHLORIDE 0.9 % (FLUSH) 0.9 %
5-40 SYRINGE (ML) INJECTION EVERY 8 HOURS
Status: DISCONTINUED | OUTPATIENT
Start: 2019-02-11 | End: 2019-02-12 | Stop reason: HOSPADM

## 2019-02-11 RX ORDER — CEFAZOLIN SODIUM/WATER 2 G/20 ML
2 SYRINGE (ML) INTRAVENOUS ONCE
Status: COMPLETED | OUTPATIENT
Start: 2019-02-11 | End: 2019-02-11

## 2019-02-11 RX ORDER — ALBUTEROL SULFATE 90 UG/1
AEROSOL, METERED RESPIRATORY (INHALATION) AS NEEDED
Status: DISCONTINUED | OUTPATIENT
Start: 2019-02-11 | End: 2019-02-11 | Stop reason: HOSPADM

## 2019-02-11 RX ORDER — PREGABALIN 75 MG/1
150 CAPSULE ORAL ONCE
Status: COMPLETED | OUTPATIENT
Start: 2019-02-11 | End: 2019-02-11

## 2019-02-11 RX ORDER — PHENYLEPHRINE HCL IN 0.9% NACL 0.4MG/10ML
SYRINGE (ML) INTRAVENOUS AS NEEDED
Status: DISCONTINUED | OUTPATIENT
Start: 2019-02-11 | End: 2019-02-11 | Stop reason: HOSPADM

## 2019-02-11 RX ORDER — POLYETHYLENE GLYCOL 3350 17 G/17G
17 POWDER, FOR SOLUTION ORAL DAILY
Status: DISCONTINUED | OUTPATIENT
Start: 2019-02-11 | End: 2019-02-12 | Stop reason: HOSPADM

## 2019-02-11 RX ORDER — CEFAZOLIN SODIUM/WATER 2 G/20 ML
2 SYRINGE (ML) INTRAVENOUS EVERY 8 HOURS
Status: COMPLETED | OUTPATIENT
Start: 2019-02-11 | End: 2019-02-12

## 2019-02-11 RX ORDER — GEMFIBROZIL 600 MG/1
600 TABLET, FILM COATED ORAL 2 TIMES DAILY
Status: DISCONTINUED | OUTPATIENT
Start: 2019-02-11 | End: 2019-02-12 | Stop reason: HOSPADM

## 2019-02-11 RX ORDER — MAGNESIUM SULFATE 100 %
4 CRYSTALS MISCELLANEOUS AS NEEDED
Status: DISCONTINUED | OUTPATIENT
Start: 2019-02-11 | End: 2019-02-12 | Stop reason: HOSPADM

## 2019-02-11 RX ADMIN — NEOSTIGMINE METHYLSULFATE 5 MG: 1 INJECTION INTRAVENOUS at 09:53

## 2019-02-11 RX ADMIN — OXYCODONE HYDROCHLORIDE 10 MG: 5 TABLET ORAL at 16:12

## 2019-02-11 RX ADMIN — FAMOTIDINE 20 MG: 20 TABLET ORAL at 17:58

## 2019-02-11 RX ADMIN — CELECOXIB 400 MG: 200 CAPSULE ORAL at 07:25

## 2019-02-11 RX ADMIN — ROCURONIUM BROMIDE 10 MG: 10 INJECTION, SOLUTION INTRAVENOUS at 08:59

## 2019-02-11 RX ADMIN — ACETAMINOPHEN 1000 MG: 500 TABLET ORAL at 07:25

## 2019-02-11 RX ADMIN — Medication 40 MCG: at 08:37

## 2019-02-11 RX ADMIN — ACETAMINOPHEN 650 MG: 325 TABLET ORAL at 12:31

## 2019-02-11 RX ADMIN — OXYCODONE HYDROCHLORIDE 5 MG: 5 TABLET ORAL at 13:00

## 2019-02-11 RX ADMIN — SODIUM CHLORIDE 125 ML/HR: 900 INJECTION, SOLUTION INTRAVENOUS at 18:20

## 2019-02-11 RX ADMIN — SUCCINYLCHOLINE CHLORIDE 160 MG: 20 INJECTION INTRAMUSCULAR; INTRAVENOUS at 08:20

## 2019-02-11 RX ADMIN — INFLUENZA VIRUS VACCINE 0.5 ML: 15; 15; 15; 15 SUSPENSION INTRAMUSCULAR at 13:00

## 2019-02-11 RX ADMIN — ROCURONIUM BROMIDE 10 MG: 10 INJECTION, SOLUTION INTRAVENOUS at 09:37

## 2019-02-11 RX ADMIN — SENNOSIDES AND DOCUSATE SODIUM 1 TABLET: 8.6; 5 TABLET ORAL at 17:58

## 2019-02-11 RX ADMIN — SODIUM CHLORIDE, SODIUM LACTATE, POTASSIUM CHLORIDE, AND CALCIUM CHLORIDE 25 ML/HR: 600; 310; 30; 20 INJECTION, SOLUTION INTRAVENOUS at 07:00

## 2019-02-11 RX ADMIN — ALBUTEROL SULFATE 2 PUFF: 90 AEROSOL, METERED RESPIRATORY (INHALATION) at 09:50

## 2019-02-11 RX ADMIN — OXYCODONE HYDROCHLORIDE 5 MG: 5 TABLET ORAL at 19:00

## 2019-02-11 RX ADMIN — Medication 40 MCG: at 08:40

## 2019-02-11 RX ADMIN — PROPOFOL 180 MG: 10 INJECTION, EMULSION INTRAVENOUS at 08:20

## 2019-02-11 RX ADMIN — SODIUM CHLORIDE, SODIUM LACTATE, POTASSIUM CHLORIDE, AND CALCIUM CHLORIDE: 600; 310; 30; 20 INJECTION, SOLUTION INTRAVENOUS at 10:05

## 2019-02-11 RX ADMIN — Medication 10 ML: at 14:17

## 2019-02-11 RX ADMIN — GEMFIBROZIL 600 MG: 600 TABLET ORAL at 12:31

## 2019-02-11 RX ADMIN — Medication 40 MCG: at 09:26

## 2019-02-11 RX ADMIN — ACETAMINOPHEN 650 MG: 325 TABLET ORAL at 17:58

## 2019-02-11 RX ADMIN — ALBUTEROL SULFATE 3 PUFF: 90 AEROSOL, METERED RESPIRATORY (INHALATION) at 08:34

## 2019-02-11 RX ADMIN — SODIUM CHLORIDE 125 ML/HR: 900 INJECTION, SOLUTION INTRAVENOUS at 10:34

## 2019-02-11 RX ADMIN — GEMFIBROZIL 600 MG: 600 TABLET ORAL at 17:58

## 2019-02-11 RX ADMIN — KETOROLAC TROMETHAMINE 30 MG: 30 INJECTION, SOLUTION INTRAMUSCULAR at 18:01

## 2019-02-11 RX ADMIN — TRANEXAMIC ACID 1000 MG: 100 INJECTION, SOLUTION INTRAVENOUS at 11:01

## 2019-02-11 RX ADMIN — ATORVASTATIN CALCIUM 20 MG: 20 TABLET, FILM COATED ORAL at 12:32

## 2019-02-11 RX ADMIN — GLYCOPYRROLATE 0.6 MG: 0.2 INJECTION INTRAMUSCULAR; INTRAVENOUS at 09:53

## 2019-02-11 RX ADMIN — ROCURONIUM BROMIDE 10 MG: 10 INJECTION, SOLUTION INTRAVENOUS at 09:14

## 2019-02-11 RX ADMIN — POLYETHYLENE GLYCOL 3350 17 G: 17 POWDER, FOR SOLUTION ORAL at 12:31

## 2019-02-11 RX ADMIN — Medication 2 G: at 15:45

## 2019-02-11 RX ADMIN — PREGABALIN 150 MG: 75 CAPSULE ORAL at 07:25

## 2019-02-11 RX ADMIN — SENNOSIDES AND DOCUSATE SODIUM 1 TABLET: 8.6; 5 TABLET ORAL at 12:31

## 2019-02-11 RX ADMIN — FENTANYL CITRATE 100 MCG: 50 INJECTION, SOLUTION INTRAMUSCULAR; INTRAVENOUS at 08:20

## 2019-02-11 RX ADMIN — MIDAZOLAM HYDROCHLORIDE 2 MG: 1 INJECTION, SOLUTION INTRAMUSCULAR; INTRAVENOUS at 08:12

## 2019-02-11 RX ADMIN — OXYCODONE HYDROCHLORIDE 5 MG: 5 TABLET ORAL at 21:45

## 2019-02-11 RX ADMIN — Medication 2 G: at 08:25

## 2019-02-11 RX ADMIN — LIDOCAINE HYDROCHLORIDE 60 MG: 20 INJECTION, SOLUTION EPIDURAL; INFILTRATION; INTRACAUDAL; PERINEURAL at 08:20

## 2019-02-11 RX ADMIN — FAMOTIDINE 20 MG: 20 TABLET ORAL at 12:31

## 2019-02-11 RX ADMIN — HYDROMORPHONE HYDROCHLORIDE 0.2 MG: 2 INJECTION, SOLUTION INTRAMUSCULAR; INTRAVENOUS; SUBCUTANEOUS at 08:51

## 2019-02-11 RX ADMIN — ROCURONIUM BROMIDE 25 MG: 10 INJECTION, SOLUTION INTRAVENOUS at 08:34

## 2019-02-11 RX ADMIN — ASPIRIN 81 MG 81 MG: 81 TABLET ORAL at 17:58

## 2019-02-11 RX ADMIN — Medication 40 MCG: at 08:30

## 2019-02-11 RX ADMIN — ROCURONIUM BROMIDE 5 MG: 10 INJECTION, SOLUTION INTRAVENOUS at 08:45

## 2019-02-11 RX ADMIN — DEXAMETHASONE SODIUM PHOSPHATE 4 MG: 4 INJECTION, SOLUTION INTRA-ARTICULAR; INTRALESIONAL; INTRAMUSCULAR; INTRAVENOUS; SOFT TISSUE at 09:00

## 2019-02-11 NOTE — PROGRESS NOTES
1138: pt arrived to unit via bed on 2L NC -- Pt has been oriented to room and unit,, Primary Nurse Rolly Inman and Garvin RN performed a dual skin assessment on this patient Osorio score is 23. Pts skin has raised hyperpigmented all over BLE, BUE and trunk. Skin is fragile and scaley w. No evidence of skin breakdown or excoriation. VSS w/ no complaints at this time. Call bell and telephone w/ in reach. 1515: Bedside shift change report given to Emily Morin RN (oncoming nurse) by Joe Valentino RN (offgoing nurse). Report included the following information SBAR, Kardex, ED Summary, Procedure Summary, Intake/Output, MAR and Recent Results. Problem: Falls - Risk of 
Goal: *Absence of Falls Document Nelida Ang Fall Risk and appropriate interventions in the flowsheet. Outcome: Progressing Towards Goal 
Fall Risk Interventions: 
Mobility Interventions: Assess mobility with egress test, Patient to call before getting OOB, OT consult for ADLs, PT Consult for mobility concerns, PT Consult for assist device competence, Strengthening exercises (ROM-active/passive) Medication Interventions: Bed/chair exit alarm

## 2019-02-11 NOTE — PERIOP NOTES
Patient: Edmundo Haider MRN: [id number]  SSN: [id number] YOB: 1955  Age: 62 y.o. Sex: male Patient is status post Procedure(s): RIGHT TOTAL HIP ARTHROPLASTY ANTERIOR APPROACH, ACETABULAR BONE GRAFTING. Surgeon(s) and Role: 
   Liz Villasenor MD - Primary Local/Dose/Irrigation:  50ml 0.5% ropivicane + 50ml nacl =100ml total local 
 
             
Peripheral IV 02/11/19 Left;Posterior Forearm (Active) Site Assessment Clean, dry, & intact 2/11/2019  7:24 AM  
Phlebitis Assessment 0 2/11/2019  7:24 AM  
Infiltration Assessment 0 2/11/2019  7:24 AM  
Dressing Status Clean, dry, & intact 2/11/2019  7:24 AM  
Dressing Type Tape;Transparent 2/11/2019  7:24 AM  
Hub Color/Line Status Pink; Infusing 2/11/2019  7:24 AM  
        
Airway - Endotracheal Tube 02/11/19 Oral (Active) Line Reed Lips 2/11/2019 12:00 AM  
         
 
 
 
Dressing/Packing:  Wound Hip Right-Dressing Type : Sutures; Topical skin adhesive/glue(honeycomb) (02/11/19 1112) Splint/Cast:  ]

## 2019-02-11 NOTE — PROGRESS NOTES
Reason for Admission: Right Hip Osteoarthritis RRAT Score:  3 Plan for utilizing home health: [facility] first choice [facility] second choice Likelihood of Readmission: low Transition of Care Plan:                   
CM made room visit with patient who was alert and oriented. Pt confirmed demographics (physical address Physical address: [address] [place] [address] [address]), emergency contact, insurance, and PCP (last seen 2 wk ago). Pt uses [facility]. Pt lives with son and son's gf in pts single level home with 3 entry steps. Prior to admission patient independent with ADLs, IADLS, and driving. Pt denies any DME or history with HH, SNF, or IPR. Pt's son to transport pt home upon d/c. Pt chose [facility] first choice and [facility] second choice. CM has sent referral to [facility] and waiting for response. CM has sent referral to [facility] for RW and they have accepted. CM will continue to follow. Care Management Interventions PCP Verified by CM: Yes(2 wk ago ) Mode of Transport at Discharge: Other (see comment) Transition of Care Consult (CM Consult): Discharge Planning, Home Health Discharge Durable Medical Equipment: Yes Physical Therapy Consult: Yes Occupational Therapy Consult: Yes Speech Therapy Consult: No 
Current Support Network: Own Home, Other, Family Lives Nearby(pt lives with son and son's gf in pts single level home with 3 entry steps ) Confirm Follow Up Transport: Family Plan discussed with Pt/Family/Caregiver: Yes Discharge Location Discharge Placement: Home with home health [facility], [address]

## 2019-02-11 NOTE — PROGRESS NOTES
Problem: Self Care Deficits Care Plan (Adult) Goal: *Acute Goals and Plan of Care (Insert Text) Occupational Therapy Goals Initiated 2/11/2019 1. Patient will perform lower body dressing using Reacher and min assist level within 7 days. 2. Patient will perform toilet transfers at modified independence level using Walkers, Type: Rolling Walker  within 7 days. 3. Patient will perform all aspects of toileting at independence level within 7 days. 4. Patient will demonstrate 3/3 hip precautions without cues within 7 days. Occupational Therapy EVALUATION Patient: Gris Norris (1 y.o. male) Date: 2/11/2019 Primary Diagnosis: S/P hip replacement [Z96.649] Procedure(s) (LRB): 
RIGHT TOTAL HIP ARTHROPLASTY ANTERIOR APPROACH, ACETABULAR BONE GRAFTING (Right) Day of Surgery Precautions:   Fall, WBAT 
 
ASSESSMENT : 
Based on the objective data described below, the patient presents with generalized weakness, decreased endurance, strength, functional mobility and ADLs. Pt was living with family and stated that he was independent with ADLs and ILS. Pt was cleared to be seen and all VSS and pt was on RA. Pt was min assist of 1 for bed mobility and stated that his pain was an 8 with supine to sit. He was able to sit on EOb with no assist and educated pt on LB AE and pt stated that his son or his sons girlfriend will help him with LB dressing and he was not interested right now in AE. Pt was able to stand with CGA and he was stating that his pain in hip had increased. Pt was CGA to walk to bathroom and transfer to toilet with CGA and stated that his pain increased while he was sitting on toilet . Pt also has a low toilet at home and after he stood his pain was increased. Pt was having more pain with walking after standing from the toilet and rated his pain as an 8 and nursing was notified at the start of the session that pt needs pain meds.   Pt was walked back to bed with CGA and use of RW and was min assist of 1 for sit to supine. Pt was left in bed with call bell and ice pack on right hip. Recommendations for pt at discharge are to return home with family and no further OT needed. Patient will benefit from skilled intervention to address the above impairments. Patients rehabilitation potential is considered to be Good Factors which may influence rehabilitation potential include:  
[x]                None noted []                Mental ability/status []                Medical condition []                Home/family situation and support systems []                Safety awareness []                Pain tolerance/management 
[]                Other: PLAN : 
Recommendations and Planned Interventions: 
[x]                  Self Care Training                  []           Therapeutic Activities [x]                  Functional Mobility Training    []           Cognitive Retraining 
[x]                  Therapeutic Exercises           [x]           Endurance Activities []                  Balance Training                   []           Neuromuscular Re-Education []                  Visual/Perceptual Training     [x]      Home Safety Training 
[x]                  Patient Education                 [x]           Family Training/Education []                  Other (comment): Frequency/Duration: Patient will be followed by occupational therapy 5 times a week to address goals. Discharge Recommendations: Home Health PT Further Equipment Recommendations for Discharge: tbd SUBJECTIVE:  
Patient stated My pain is getting worse.  OBJECTIVE DATA SUMMARY:  
HISTORY:  
Past Medical History:  
Diagnosis Date  Arthritis  CAD (coronary artery disease) lipids  Chronic obstructive pulmonary disease ([address]  Chronic pain  Diabetes [address]  Hypertension  Ill-defined condition  Infectious disease Past Surgical History: Procedure Laterality Date  HX TONSILLECTOMY Prior Level of Function/Environment/Context: pt lives with family and was independent with ADLs and ILS PTA Occupations in which the patient is/was successful, what are the barriers preventing that success:  
Performance Patterns (routines, roles, habits, and rituals):  
Personal Interests and/or values:  
Expanded or extensive additional review of patient history:  
 
Home Situation Home Environment: Private residence # Steps to Enter: 3 One/Two Story Residence: One story Living Alone: No 
Support Systems: Family member(s), Friends \ neighbors Patient Expects to be Discharged to[de-identified] Private residence Current DME Used/Available at Home: None Hand dominance: RightEXAMINATION OF PERFORMANCE DEFICITS: 
Cognitive/Behavioral Status: 
Neurologic State: Alert Orientation Level: Appropriate for age;Oriented X4 Cognition: Appropriate decision making; Follows commands Perception: Appears intact Perseveration: No perseveration noted Safety/Judgement: Awareness of environment Skin: in fair health Edema: none noted Hearing: Auditory Auditory Impairment: None Vision/Perceptual:   
    
    
   intact Range of Motion: 
 
AROM: Within functional limits PROM: Within functional limits Strength: 
 
Strength: Generally decreased, functional 
  
  
  
  
Coordination: 
Coordination: Within functional limits Fine Motor Skills-Upper: Left Intact; Right Intact Gross Motor Skills-Upper: Left Intact; Right Intact Tone & Sensation: 
 
Tone: Normal 
Sensation: Intact Balance: 
Sitting: Intact; With support Standing: Intact; With support Functional Mobility and Transfers for ADLs:Bed Mobility: 
Rolling: Minimum assistance Supine to Sit: Minimum assistance;Assist x1 Sit to Supine: Minimum assistance;Assist x1 Scooting: Independent Transfers: 
Sit to Stand: Contact guard assistance Stand to Sit: Contact guard assistance Bathroom Mobility: Contact guard assistance Toilet Transfer : Contact guard assistance ADL Assessment: 
Feeding: Independent Oral Facial Hygiene/Grooming: Setup Bathing: Maximum assistance;Minimum assistance Upper Body Dressing: Independent Lower Body Dressing: Maximum assistance;Minimum assistance Toileting: Minimum assistance;Contact guard assistance ADL Intervention and task modifications: 
Feeding Feeding Assistance: Independent Pt is setup for UB ADLs and max to mod for LB ADLs Toileting Toileting Assistance: Minimum assistance;Contact guard assistance Bladder Hygiene: Contact guard assistance Bowel Hygiene: Minimum assistance Cognitive Retraining Safety/Judgement: Awareness of environment Functional Measure: 
Barthel Index: 
 
Bathin Bladder: 10 Bowels: 10 
Groomin Dressin Feeding: 10 Mobility: 0 Stairs: 0 Toilet Use: 5 Transfer (Bed to Chair and Back): 10 Total: 55 The Barthel ADL Index: Guidelines 1. The index should be used as a record of what a patient does, not as a record of what a patient could do. 2. The main aim is to establish degree of independence from any help, physical or verbal, however minor and for whatever reason. 3. The need for supervision renders the patient not independent. 4. A patient's performance should be established using the best available evidence. Asking the patient, friends/relatives and nurses are the usual sources, but direct observation and common sense are also important. However direct testing is not needed. 5. Usually the patient's performance over the preceding 24-48 hours is important, but occasionally longer periods will be relevant. 6. Middle categories imply that the patient supplies over 50 per cent of the effort. 7. Use of aids to be independent is allowed. Stormy Levy., Barthel, D.W. ().  Functional evaluation: the Barthel Index. [facility] (14)2. MACI Davies, Rahul Rizzo., Zaheer Sommers., Pallavi, [address] (1999). Measuring the change indisability after inpatient rehabilitation; comparison of the responsiveness of the Barthel Index and Functional [place] Measure. Journal of Neurology, Neurosurgery, and Psychiatry, 66(4), 458-013. MYLENE Westbrook, RAFAEL Hart, & Ciarra Veloz M.A. (2004.) Assessment of post-stroke quality of life in cost-effectiveness studies: The usefulness of the Barthel Index and the EuroQoL-5D. [facility], 13, 369-18 Occupational Therapy Evaluation Charge Determination History Examination Decision-Making MEDIUM Complexity : Expanded review of history including physical, cognitive and psychosocial  history  LOW Complexity : 1-3 performance deficits relating to physical, cognitive , or psychosocial skils that result in activity limitations and / or participation restrictions  LOW Complexity : No comorbidities that affect functional and no verbal or physical assistance needed to complete eval tasks Based on the above components, the patient evaluation is determined to be of the following complexity level: LOW Pain: 
Pain Scale 1: Numeric (0 - 10) Pain Intensity 1: 7 Pain Location 1: Hip Pain Orientation 1: Right Pain Description 1: Pepe Milder Pain Intervention(s) 1: Medication (see MAR) Activity Tolerance:  
fair Please refer to the flowsheet for vital signs taken during this treatment. After treatment:  
[] Patient left in no apparent distress sitting up in chair 
[x] Patient left in no apparent distress in bed 
[x] Call bell left within reach [x] Nursing notified 
[] Caregiver present 
[] Bed alarm activated COMMUNICATION/EDUCATION:  
The patients plan of care was discussed with: Physical Therapist and Registered Nurse. [x]    Home safety education was provided and the patient/caregiver indicated understanding. [x]    Patient/family have participated as able in goal setting and plan of care. [x]    Patient/family agree to work toward stated goals and plan of care. []    Patient understands intent and goals of therapy, but is neutral about his/her participation. []    Patient is unable to participate in goal setting and plan of care. This patients plan of care is appropriate for delegation to BRIAN. Thank you for this referral. 
Narcisa Moran OT Time Calculation: 43 mins

## 2019-02-11 NOTE — PROGRESS NOTES
Ortho/ NeuroSurgery NP Note POD# 0  s/p RIGHT TOTAL HIP ARTHROPLASTY ANTERIOR APPROACH, ACETABULAR BONE GRAFTING Pt resting in bed. No complaints. VSS Afebrile. Patient has not had something to eat/drink. No nausea. Most Recent Labs:  
Lab Results Component Value Date/Time HGB 13.2 02/04/2019 09:29 AM  
 Hemoglobin A1c 4.8 02/04/2019 09:29 AM  
 
 
Body mass index is 29.32 kg/m². Reference: BMI greater than 30 is classified as obesity and greater than 40 is classified as morbid obesity. Voiding status: no void yet. Dressing c.d.i Calves soft and supple; No pain with passive stretch Sensation and motor intact SCDs for mechanical DVT proph Plan: 
1) PT BID starting today 2) Agustina-op Antibiotics Ancef 3) Aspirin 81 mg PO BID for DVT Prophylaxis 4) Pepcid for GI Prophylaxis 5) Discharge plans to home with wife today vs tomorrow Readiness for discharge: 
   [x] Vital Signs stable  
 [x] Hgb stable  
 [] + Voiding  
 [x] Incision intact, drainage minimal  
 [x] Tolerating PO intake   
 [] Cleared by PT (OT if applicable) [] Stair training completed (if applicable) [] Independent/Contact Guard ambulation with assistive device (household distance) [] Bed mobility [] Car transfers  
  [] ADLs [x] Adequate pain control on oral medication alone Heavy past smoker - will need to wean off O2. Bone grafting with old femoral head component.    
 
Gonsalo Araujo NP

## 2019-02-11 NOTE — PROGRESS NOTES
TRANSFER - IN REPORT: 
 
Verbal report received from ZION Belcher (name) on Patricia Glover  being received from PACU (unit) for routine progression of care Report consisted of patients Situation, Background, Assessment and  
Recommendations(SBAR). Information from the following report(s) SBAR, Kardex, [facility] and Cardiac Rhythm SB/NSR was reviewed with the receiving nurse. Opportunity for questions and clarification was provided. Assessment completed upon patients arrival to unit and care assumed.

## 2019-02-11 NOTE — PERIOP NOTES
TRANSFER - OUT REPORT: 
 
Verbal report given to [facility] RN on [address]  being transferred to [facility](unit) for routine post - op Report consisted of patients Situation, Background, Assessment and  
Recommendations(SBAR). Information from the following report(s) SBAR, OR Summary, Procedure Summary, Intake/Output, MAR, Recent Results and Cardiac Rhythm NSR was reviewed with the receiving nurse. Opportunity for questions and clarification was provided. Patient transported with: 
 O2 @ 3 liters Tech

## 2019-02-11 NOTE — PROGRESS NOTES
Problem: Mobility Impaired (Adult and Pediatric) Goal: *Acute Goals and Plan of Care (Insert Text) Physical Therapy Goals Initiated 2/11/2019 1. Patient will move from supine to sit and sit to supine  in bed with independence within 4 days. 2. Patient will perform sit to stand with independence within 4 days. 3. Patient will ambulate with modified independence for 200 feet with the least restrictive device within 4 days. 4. Patient will ascend/descend 3 stairs with 1 handrail(s) with minimal assistance/contact guard assist within 4 days. 5. Patient will verbalize and demonstrate understanding of anterior precautions per protocol within 4 days. 6. Patient will perform hip home exercise program per protocol with supervision/set-up within 4 days. physical Therapy EVALUATION Patient: Gladis Clement (82 y.o. male) Date: 2/11/2019 Primary Diagnosis: S/P hip replacement [Z96.649] Procedure(s) (LRB): 
RIGHT TOTAL HIP ARTHROPLASTY ANTERIOR APPROACH, ACETABULAR BONE GRAFTING (Right) Day of Surgery Precautions:   Fall, WBAT 
 
ASSESSMENT : 
Based on the objective data described below, the patient presents with decreased ROM, strength and balance in operative leg following R STARLA POD#0. Pt is eager to discharge today and will have assistance from family members at discharge. Pt challenged with bed mobility and car transfer d/t pain but able to perform with light Min A to advance RLE. Pt ambulated 120ft with SBA and good sequencing with RW. Completed 3 steps with SPC and hand held assist as pt does not have railings at home. Plan to drive up to the front door where no steps are needed. Completed car transfer and reviewed HEP. From a therapy standpoint family can provide needed assistance and pt is cleared from a mobility standpoint. Given gait belt to aide in moving RLE for transfers. Requesting more pain medication at end of session. Will need RW prior to discharge. Patient will benefit from skilled intervention to address the above impairments. Patients rehabilitation potential is considered to be Excellent Factors which may influence rehabilitation potential include:  
[]         None noted 
[]         Mental ability/status []         Medical condition 
[]         Home/family situation and support systems 
[]         Safety awareness [x]         Pain tolerance/management 
[]         Other: PLAN : 
Recommendations and Planned Interventions: 
[x]           Bed Mobility Training             [x]    Neuromuscular Re-Education 
[x]           Transfer Training                   []    Orthotic/Prosthetic Training 
[x]           Gait Training                         []    Modalities [x]           Therapeutic Exercises           []    Edema Management/Control 
[x]           Therapeutic Activities            [x]    Patient and Family Training/Education 
[]           Other (comment): Frequency/Duration: Patient will be followed by physical therapy  twice daily to address goals. Discharge Recommendations: Home Health Further Equipment Recommendations for Discharge: rolling walker SUBJECTIVE:  
Patient stated I want to get Peri here and hit [employer].  OBJECTIVE DATA SUMMARY:  
HISTORY:   
Past Medical History:  
Diagnosis Date  Arthritis  CAD (coronary artery disease) lipids  Chronic obstructive pulmonary disease ([facility] [address]  Chronic pain  Diabetes ([facility]  Hypertension  Ill-defined condition  Infectious disease Past Surgical History:  
Procedure Laterality Date  HX TONSILLECTOMY Prior Level of Function/Home Situation: Independent. Personal factors and/or comorbidities impacting plan of care: CAD, arthritis, diabetes, chronic pain 
 
Home Situation Home Environment: Private residence # Steps to Enter: 3 One/Two Story Residence: One story Living Alone: No 
Support Systems: Family member(s), Friends \ neighbors Patient Expects to be Discharged to[de-identified] Private residence Current DME Used/Available at Home: None EXAMINATION/PRESENTATION/DECISION MAKING: Critical Behavior: 
Neurologic State: Alert, Appropriate for age, Drowsy Orientation Level: Oriented to person, Oriented to place, Oriented to situation, Oriented to time Cognition: Follows commands Hearing: Auditory Auditory Impairment: NoneSkin:   
Edema:  
Range Of Motion: 
AROM: Generally decreased, functional 
  
  
  
  
  
  
  
Strength:   
Strength: Generally decreased, functional 
  
  
  
  
  
  
Tone & Sensation:  
Tone: Normal 
  
  
  
  
Sensation: Intact Coordination: 
Coordination: Within functional limits Vision:  
  
Functional Mobility: 
Bed Mobility: 
  
Supine to Sit: Minimum assistance Sit to Supine: Contact guard assistance(with gait belt) Transfers: 
Sit to Stand: Contact guard assistance Stand to Sit: Contact guard assistance Balance:  
Sitting: Intact; With support Standing: Intact; With supportAmbulation/Gait Training:Distance (ft): 120 Feet (ft) Ambulation - Level of Assistance: Stand-by assistance Gait Abnormalities: Decreased step clearance; Antalgic Base of Support: Widened Speed/Rayne: Pace decreased (<100 feet/min) Stairs: Therapeutic Exercises:  
Hip HEP Functional Measure: 
Tinetti test: 
 
Sitting Balance: 1 Arises: 1 Attempts to Rise: 1 Immediate Standing Balance: 1 Standing Balance: 1 Nudged: 2 Eyes Closed: 1 Turn 360 Degrees - Continuous/Discontinuous: 1 Turn 360 Degrees - Steady/Unsteady: 1 Sitting Down: 1 Balance Score: 11 Indication of Gait: 1 
R Step Length/Height: 1 L Step Length/Height: 1 
R Foot Clearance: 1 L Foot Clearance: 1 Step Symmetry: 0 Step Continuity: 1 Path: 1 Trunk: 0 Walking Time: 0 Gait Score: 7 Total Score: 18 Tinetti Tool Score Risk of Falls 
<19 = High Fall Risk 19-24 = Moderate Fall Risk 25-28 = Low Fall Risk [place]. Performance-Oriented Assessment of Mobility Problems in Elderly Patients. [facility] 66; [id number]. (Scoring Description: PT Bulletin Feb. 10, 1993) Older adults: Ana Lilia Alvarado et al, 2009; n = [address] elderly evaluated with ABC, DANIA, ADL, and IADL) · Mean DANIA score for males aged 69-68 years = 26.21(3.40) · Mean DANIA score for females age 69-68 years = 25.16(4.30) · Mean DANIA score for males over 80 years = 23.29(6.02) · Mean DANIA score for females over 80 years = 17.20(8.32) Physical Therapy Evaluation Charge Determination History Examination Presentation Decision-Making MEDIUM  Complexity : 1-2 comorbidities / personal factors will impact the outcome/ POC  MEDIUM Complexity : 3 Standardized tests and measures addressing body structure, function, activity limitation and / or participation in recreation  LOW Complexity : Stable, uncomplicated  Other outcome measures tinetti  LOW Based on the above components, the patient evaluation is determined to be of the following complexity level: LOW Pain: 
Pain Scale 1: Numeric (0 - 10) Pain Intensity 1: 3 Pain Location 1: Hip Pain Orientation 1: Right Pain Description 1: Candance Pronto Pain Intervention(s) 1: Medication (see MAR) Activity Tolerance:  
Good-limited by pain Please refer to the flowsheet for vital signs taken during this treatment. After treatment:  
[]         Patient left in no apparent distress sitting up in chair 
[x]         Patient left in no apparent distress in bed 
[x]         Call bell left within reach [x]         Nursing notified 
[]         Caregiver present 
[]         Bed alarm activated COMMUNICATION/EDUCATION:  
The patients plan of care was discussed with: Registered Nurse. [x]         Fall prevention education was provided and the patient/caregiver indicated understanding.  
[x]         Patient/family have participated as able in goal setting and plan of care. [x]         Patient/family agree to work toward stated goals and plan of care. []         Patient understands intent and goals of therapy, but is neutral about his/her participation. []         Patient is unable to participate in goal setting and plan of care. Thank you for this referral. 
Alexys Rao, PT Time Calculation: 38 mins

## 2019-02-11 NOTE — ANESTHESIA PREPROCEDURE EVALUATION
Anesthetic History Review of Systems / Medical History Patient summary reviewed, nursing notes reviewed and pertinent labs reviewed Pulmonary COPD Smoker Neuro/Psych Cardiovascular Hypertension CAD 
 
 
  
GI/Hepatic/Renal 
Within defined limits Endo/Other Diabetes Obesity and arthritis Other Findings Physical Exam 
 
Airway Mallampati: I Neck ROM: normal range of motion Mouth opening: Normal 
 
 Cardiovascular Regular rate and rhythm,  S1 and S2 normal,  no murmur, click, rub, or gallop Dental 
 
Dentition: Edentulous Pulmonary Breath sounds clear to auscultation Abdominal 
GI exam deferred Other Findings Anesthetic Plan ASA: 3 Anesthesia type: general 
 
Monitoring Plan: BIS Induction: Intravenous Anesthetic plan and risks discussed with: Patient

## 2019-02-11 NOTE — PERIOP NOTES
Handoff Report from Operating Room to PACU Report received from [facility] and Magnus Wren CRNA regarding Barrie Caro. Surgeon(s): 
Janis Lamb MD  And Procedure(s) (LRB): 
RIGHT TOTAL HIP ARTHROPLASTY ANTERIOR APPROACH, ACETABULAR BONE GRAFTING (Right)  confirmed  
with allergies and dressings discussed. Anesthesia type, drugs, patient history, complications, estimated blood loss, vital signs, intake and output, and last pain medication and reversal medications were reviewed.

## 2019-02-12 VITALS
BODY MASS INDEX: 29.2 KG/M2 | SYSTOLIC BLOOD PRESSURE: 140 MMHG | DIASTOLIC BLOOD PRESSURE: 84 MMHG | TEMPERATURE: 98.1 F | HEIGHT: 66 IN | OXYGEN SATURATION: 93 % | RESPIRATION RATE: 18 BRPM | WEIGHT: 181.66 LBS | HEART RATE: 80 BPM

## 2019-02-12 LAB
ANION GAP SERPL CALC-SCNC: 6 MMOL/L (ref 5–15)
BUN SERPL-MCNC: 22 MG/DL (ref 6–20)
BUN/CREAT SERPL: 21 (ref 12–20)
CALCIUM SERPL-MCNC: 7.3 MG/DL (ref 8.5–10.1)
CHLORIDE SERPL-SCNC: 104 MMOL/L (ref 97–108)
CO2 SERPL-SCNC: 26 MMOL/L (ref 21–32)
CREAT SERPL-MCNC: 1.07 MG/DL (ref 0.7–1.3)
GLUCOSE BLD STRIP.AUTO-MCNC: 125 MG/DL (ref 65–100)
GLUCOSE BLD STRIP.AUTO-MCNC: 130 MG/DL (ref 65–100)
GLUCOSE SERPL-MCNC: 94 MG/DL (ref 65–100)
HGB BLD-MCNC: 8.9 G/DL (ref 12.1–17)
POTASSIUM SERPL-SCNC: 4.4 MMOL/L (ref 3.5–5.1)
SERVICE CMNT-IMP: ABNORMAL
SERVICE CMNT-IMP: ABNORMAL
SODIUM SERPL-SCNC: 136 MMOL/L (ref 136–145)

## 2019-02-12 PROCEDURE — 85018 HEMOGLOBIN: CPT

## 2019-02-12 PROCEDURE — 36415 COLL VENOUS BLD VENIPUNCTURE: CPT

## 2019-02-12 PROCEDURE — 74011250636 HC RX REV CODE- 250/636: Performed by: PHYSICIAN ASSISTANT

## 2019-02-12 PROCEDURE — 74011250637 HC RX REV CODE- 250/637: Performed by: PHYSICIAN ASSISTANT

## 2019-02-12 PROCEDURE — 97530 THERAPEUTIC ACTIVITIES: CPT | Performed by: PHYSICAL THERAPIST

## 2019-02-12 PROCEDURE — 97116 GAIT TRAINING THERAPY: CPT | Performed by: PHYSICAL THERAPIST

## 2019-02-12 PROCEDURE — 82962 GLUCOSE BLOOD TEST: CPT

## 2019-02-12 PROCEDURE — 97535 SELF CARE MNGMENT TRAINING: CPT | Performed by: OCCUPATIONAL THERAPIST

## 2019-02-12 PROCEDURE — 80048 BASIC METABOLIC PNL TOTAL CA: CPT

## 2019-02-12 RX ORDER — FAMOTIDINE 20 MG/1
20 TABLET, FILM COATED ORAL 2 TIMES DAILY
Qty: 60 TAB | Refills: 0 | Status: SHIPPED | OUTPATIENT
Start: 2019-02-12 | End: 2019-03-14

## 2019-02-12 RX ORDER — ACETAMINOPHEN 325 MG/1
650 TABLET ORAL EVERY 6 HOURS
Qty: 112 TAB | Refills: 0 | Status: SHIPPED | OUTPATIENT
Start: 2019-02-12 | End: 2019-02-26

## 2019-02-12 RX ORDER — OXYCODONE HYDROCHLORIDE 5 MG/1
5-10 TABLET ORAL
Qty: 80 TAB | Refills: 0 | Status: SHIPPED | OUTPATIENT
Start: 2019-02-12 | End: 2019-02-25

## 2019-02-12 RX ORDER — AMOXICILLIN 250 MG
1 CAPSULE ORAL DAILY
Qty: 30 TAB | Refills: 0 | Status: SHIPPED | OUTPATIENT
Start: 2019-02-12

## 2019-02-12 RX ORDER — GUAIFENESIN 100 MG/5ML
81 LIQUID (ML) ORAL 2 TIMES DAILY
Qty: 60 TAB | Refills: 0 | Status: SHIPPED | OUTPATIENT
Start: 2019-02-12 | End: 2019-03-14

## 2019-02-12 RX ORDER — POLYETHYLENE GLYCOL 3350 17 G/17G
17 POWDER, FOR SOLUTION ORAL
Qty: 15 PACKET | Refills: 0 | Status: SHIPPED | OUTPATIENT
Start: 2019-02-12 | End: 2019-02-27

## 2019-02-12 RX ADMIN — ASPIRIN 81 MG 81 MG: 81 TABLET ORAL at 09:43

## 2019-02-12 RX ADMIN — FAMOTIDINE 20 MG: 20 TABLET ORAL at 09:44

## 2019-02-12 RX ADMIN — POLYETHYLENE GLYCOL 3350 17 G: 17 POWDER, FOR SOLUTION ORAL at 09:43

## 2019-02-12 RX ADMIN — GEMFIBROZIL 600 MG: 600 TABLET ORAL at 09:44

## 2019-02-12 RX ADMIN — SENNOSIDES AND DOCUSATE SODIUM 1 TABLET: 8.6; 5 TABLET ORAL at 10:17

## 2019-02-12 RX ADMIN — OXYCODONE HYDROCHLORIDE 5 MG: 5 TABLET ORAL at 09:43

## 2019-02-12 RX ADMIN — KETOROLAC TROMETHAMINE 30 MG: 30 INJECTION, SOLUTION INTRAMUSCULAR at 13:12

## 2019-02-12 RX ADMIN — ACETAMINOPHEN 650 MG: 325 TABLET ORAL at 01:09

## 2019-02-12 RX ADMIN — ACETAMINOPHEN 650 MG: 325 TABLET ORAL at 13:12

## 2019-02-12 RX ADMIN — ACETAMINOPHEN 650 MG: 325 TABLET ORAL at 05:40

## 2019-02-12 RX ADMIN — ATORVASTATIN CALCIUM 20 MG: 20 TABLET, FILM COATED ORAL at 09:44

## 2019-02-12 RX ADMIN — Medication 10 ML: at 05:40

## 2019-02-12 RX ADMIN — KETOROLAC TROMETHAMINE 30 MG: 30 INJECTION, SOLUTION INTRAMUSCULAR at 05:40

## 2019-02-12 RX ADMIN — KETOROLAC TROMETHAMINE 30 MG: 30 INJECTION, SOLUTION INTRAMUSCULAR at 01:09

## 2019-02-12 RX ADMIN — Medication 2 G: at 01:10

## 2019-02-12 RX ADMIN — DEXAMETHASONE SODIUM PHOSPHATE 10 MG: 4 INJECTION, SOLUTION INTRA-ARTICULAR; INTRALESIONAL; INTRAMUSCULAR; INTRAVENOUS; SOFT TISSUE at 09:00

## 2019-02-12 RX ADMIN — Medication 10 ML: at 13:13

## 2019-02-12 NOTE — PROGRESS NOTES
CM has provided patient with RW. [facility] has declined patient (out of service range). CM has sent referral to [facility] to see if they are in his area and able to accept. CM will continue to follow. UPDATE 9:03AM 
 
 [facility] has accepted patient. CM has updated AVS and completed needs of patient at this time. Care Management Interventions PCP Verified by CM: Yes(2 wk ago ) Mode of Transport at Discharge: Other (see comment) Transition of Care Consult (CM Consult): Discharge Planning, Home Health [facility] - INPATIENT: No 
Reason Outside [place]: Out of service area Discharge Durable Medical Equipment: Yes Physical Therapy Consult: Yes Occupational Therapy Consult: Yes Speech Therapy Consult: No 
Current Support Network: Own Home, Other, Family Lives Nearby(pt lives with son and son's gf in pts single level home with 3 entry steps ) Confirm Follow Up Transport: Family Plan discussed with Pt/Family/Caregiver: Yes Discharge Location Discharge Placement: Home with home health(at home care) Karina Aguilera, [address]

## 2019-02-12 NOTE — PROGRESS NOTES
Reviewed discharge instructions, prescriptions, and side effects with patient and his family. Reviewed wound care, follow-up instructions, and medication instructions. Answered all questions and provided contact information for future questions. Took IV out per policy, Catheter tip intact. Going home in a car with home health.

## 2019-02-12 NOTE — PROGRESS NOTES
Orthopedic End of Shift Note Bedside and Verbal shift change report given to [place] (oncoming nurse) by Ignacia Rubio (offgoing nurse). Report included the following information SBAR, Kardex, MAR and Recent Results. POD# Significant issues during shift: none Issues for Physician to address: none Nausea/Vomiting [] yes [x] no Bey Catheter [] in [] removed Bowel Movements [] yes [x] no Foot Pumps or SCD [] yes [x] no Ice Pack [x] yes    [] no Incentive Spirometer [] yes [] no Volume:   N/A Telementary Monitoring   [] yes [x] no Rhythm:  
Supplemental O2 [] yes [x] no Sat off O2:   98% Patient Vitals for the past 12 hrs: 
 Temp Pulse Resp BP SpO2  
02/12/19 0302 98.2 °F (36.8 °C) 67 15 110/55 98 % 02/11/19 2332 98.2 °F (36.8 °C) 62 16 122/72 98 % Lab Results Component Value Date/Time HGB 8.9 (L) 02/12/2019 02:34 AM  
 HCT 39.3 02/04/2019 09:29 AM  
 
Lab Results Component Value Date/Time INR 1.0 02/04/2019 09:29 AM  
 
 
Intake/Output Summary (Last 24 hours) at 2/12/2019 0745 Last data filed at 2/11/2019 2244 Gross per 24 hour Intake 3240.83 ml Output 700 ml Net 2540.83 ml Wound Hip Right (Active) Dressing Status  Clean, dry, and intact 2/11/2019 11:45 PM  
Dressing Type  Transparent film 2/11/2019 11:45 PM  
Incision site well approximated? Yes 2/11/2019  8:42 AM  
Drainage Amount  None 2/11/2019 11:26 AM  
Number of days: 1 Peripheral Intravenous Line: 
Peripheral IV 02/11/19 Left;Posterior Forearm (Active) Site Assessment Clean, dry, & intact 2/12/2019  3:02 AM  
Phlebitis Assessment 0 2/12/2019  3:02 AM  
Infiltration Assessment 0 2/12/2019  3:02 AM  
Dressing Status Clean, dry, & intact 2/12/2019  3:02 AM  
Dressing Type Tape;Transparent 2/12/2019  3:02 AM  
Hub Color/Line Status Pink; Infusing 2/12/2019  3:02 AM  
 
Drain(s):

## 2019-02-12 NOTE — PROGRESS NOTES
Ortho / Neurosurgery NP Note POD# 1  s/p RIGHT TOTAL HIP ARTHROPLASTY ANTERIOR APPROACH, ACETABULAR BONE GRAFTING Pt seen with Ashish Holm Pt resting in bed. No complaints of nausea, or dizziness with ambulation. Reports minimal pain that is controlled with PO pain meds. Denies any numbness or tingling sensation. His dressing is CDI. He is voiding and passing flatus. VSS Afebrile. Voiding status:voids Labs Lab Results Component Value Date/Time HGB 8.9 (L) 02/12/2019 02:34 AM  
  
Lab Results Component Value Date/Time INR 1.0 02/04/2019 09:29 AM  
  
 
Body mass index is 29.32 kg/m². : A BMI > 30 is classified as obesity and > 40 is classified as morbid obesity. Dressing c.d.i Cryotherapy in place over incision Calves soft and supple; No pain with passive stretch Sensation and motor intact SCDs for mechanical DVT proph while in bed PLAN: 
1) PT BID 
2) Aspirin 81 mg PO BID for DVT Prophylaxis 3) GI Prophylaxis - pepcid 4) Readniess for discharge: 
   [x] Vital Signs stable  
 [x] Hgb stable  
 [x] + Voiding  
 [x] Wound intact, drainage minimal  
 [x] Tolerating PO intake   
 [] Cleared by PT (OT if applicable) [] Stair training completed (if applicable) [] Independent / Contact Guard Assist (household distance) [] Bed mobility [] Car transfers  
  [] ADLs [x] Adequate pain control on oral medication alone Plan Discharge home today after PT clears. [facility]

## 2019-02-12 NOTE — PROGRESS NOTES
Problem: Self Care Deficits Care Plan (Adult) Goal: *Acute Goals and Plan of Care (Insert Text) Occupational Therapy Goals Initiated 2/11/2019 1. Patient will perform lower body dressing using Reacher and min assist level within 7 days. 2. Patient will perform toilet transfers at modified independence level using Walkers, Type: Rolling Walker  within 7 days. 3. Patient will perform all aspects of toileting at independence level within 7 days. 4. Patient will demonstrate 3/3 hip precautions without cues within 7 days. Occupational Therapy TREATMENT Patient: Sherine Eli (56 y.o. male) Date: 2/12/2019 Diagnosis: S/P hip replacement [Z96.649] <principal problem not specified> Procedure(s) (LRB): 
RIGHT TOTAL HIP ARTHROPLASTY ANTERIOR APPROACH, ACETABULAR BONE GRAFTING (Right) 1 Day Post-Op Precautions: Fall, WBAT Chart, occupational therapy assessment, plan of care, and goals were reviewed. ASSESSMENT: 
Pt performed bathroom mobility using the RW with supervision from nursing just prior to tx session. Pt was educated in home safety, fall prevention, and using lower body adaptive aids - hip kit- to increase independence and safety during adls and IADLs. Pt verbalized and demonstrated understanding. He was educated in exercises to increase functional reach to his feet and the benefits of balancing rest and activity at home while he recovers. Pt has a physical job ( ) and may benefit from work hardening evaluation prior to returning to work as appropriate. Progression toward goals: 
[x]       Improving appropriately and progressing toward goals 
[]       Improving slowly and progressing toward goals 
[]       Not making progress toward goals and plan of care will be adjusted PLAN: 
Patient continues to benefit from skilled intervention to address the above impairments. Continue treatment per established plan of care. Discharge Recommendations:  None at this time Further Equipment Recommendations for Discharge:  Pt declined need for increased height on home toilet. Pt was provided with and educated in using hip kit. SUBJECTIVE:  
Patient stated no, I Can't reach to my feet.  OBJECTIVE DATA SUMMARY:  
Cognitive/Behavioral Status: 
Neurologic State: Alert; Appropriate for age Orientation Level: Appropriate for age;Oriented X4 Cognition: Appropriate decision making; Follows commands Functional Mobility and Transfers for ADLs:Bed Mobility: 
Rolling: (pt seated upon arrival, s tates that his bed is low at home) educated on technique to increase independence and safety. Pt will use a urinal at home for safety to decrease night time bathroom trips Transfers: 
  
Functional Transfers Bathroom Mobility: Independent(nursing standing by prior to OT tx session) Balance: 
Sitting: Intact(able to reach to shins--unable to reach feet) Standing: Impaired Standing - Static: Constant support;Good Standing - Dynamic : (good/fair) ADL Intervention: 
 Pt educated in using the hip kit of adaptive aids for increasing indepednence and safety during adls/IADLs. Lower Body Bathing Bathing Assistance: (educ on usnig long sponge, reacher and dressing stick) Position Performed: Seated in chair Cues: Tactile cues provided;Verbal cues provided;Visual cues provided Adaptive Equipment: Dressing stick; Long handled sponge;Reacher Upper Body Dressing Assistance Dressing Assistance: (declined) Lower Body Dressing Assistance Dressing Assistance: (pt is unable to reach to feet seated in chair (mid calf)) Underpants: (educated on using dressing stick) Socks: (educ on using the sock aid.  teach with good result) Leg Crossed Method Used: No 
Position Performed: Seated in chair Cues: Doff;Don;Physical assistance; Tactile cues provided;Verbal cues provided;Visual cues provided Adaptive Equipment Used: Dressing stick; Long handled shoe horn;Reacher;Sock aid Toileting Toileting Assistance: (pt performed toileting independently with nursing prior to t) Therapeutic Exercises:  
Encouraged gentle reaching towards ankles to increase functional reach to feet for perfroming lower body adls  Pain: 
Pain Scale 1: Numeric (0 - 10) Pain Intensity 1: 3 Pain Location 1: Hip Pain Orientation 1: Right Pain Description 1: Aching Pain Intervention(s) 1: Medication (see MAR) Activity Tolerance:  
Good. Educated pt on balancing rest and activity throughout the day and changing positions often Please refer to the flowsheet for vital signs taken during this treatment. After treatment:  
[x] Patient left in no apparent distress sitting up in chair 
[] Patient left in no apparent distress in bed 
[x] Call bell left within reach [x] Nursing notified 
[] Caregiver present 
[] Bed alarm activated COMMUNICATION/COLLABORATION:  
The patients plan of care was discussed with: Registered Nurse Hilda Or, OTR/L Time Calculation: 24 mins

## 2019-02-12 NOTE — DISCHARGE SUMMARY
Ortho Discharge Summary    Patient ID:  Dominguez Rivas  [id number]  male  Vladimir 45 y.o.  1955    Admit date: 2/11/2019    Discharge date: 2/12/2019    Admitting Physician: Nate Tavares MD     Consulting Physician(s):   Treatment Team: Attending Provider: Marques Lees MD; Nurse Practitioner: Alina Martínez NP; Care Manager: Gracie Wellington    Date of Surgery:   2/11/2019     Preoperative Diagnosis:  RIGHT HIP OA    Postoperative Diagnosis:   RIGHT HIP OA    Procedure(s):     RIGHT TOTAL HIP ARTHROPLASTY ANTERIOR APPROACH, ACETABULAR BONE GRAFTING     Anesthesia Type:   General     Surgeon: Marques Lees MD                            HPI:  Pt is a Vladimir 45 y.o. male who has a history of RIGHT HIP OA  with pain and limitations of activities of daily living who presents at this time for a right STARLA following the failure of conservative management. PMH:   Past Medical History:   Diagnosis Date    Arthritis     CAD (coronary artery disease)     lipids    Chronic obstructive pulmonary disease (HCC)     Chronic pain     Diabetes ([address]     Hypertension     Ill-defined condition     Infectious disease        Body mass index is 29.32 kg/m². : A BMI > 30 is classified as obesity and > 40 is classified as morbid obesity. Medications upon admission :   Prior to Admission Medications   Prescriptions Last Dose Informant Patient Reported? Taking? METFORMIN HCL (METFORMIN PO) 2/10/2019 at Unknown time  Yes Yes   Sig: Take 1,000 mg by mouth two (2) times a day. aspirin delayed-release 81 mg tablet 2/4/2019  Yes Yes   Sig: Take 81 mg by mouth daily. atorvastatin (LIPITOR) 20 mg tablet 2/10/2019 at Unknown time  Yes Yes   Sig: Take 20 mg by mouth daily. docosahexanoic acid/epa (FISH OIL PO) 2/4/2019  Yes Yes   Sig: Take 1,000 mg by mouth daily. gemfibrozil (LOPID) 600 mg tablet 2/10/2019 at Unknown time  Yes Yes   Sig: Take  by mouth two (2) times a day.    lisinopril-hydroCHLOROthiazide (PRINZIDE, ZESTORETIC) 20-12.5 mg per tablet 2/10/2019 at Unknown time  Yes Yes   Sig: Take 1 Tab by mouth two (2) times a day. meloxicam (MOBIC) 15 mg tablet 2/10/2019 at Unknown time  Yes Yes   Sig: Take 15 mg by mouth as needed for Pain.   multivitamin (ONE A DAY) tablet 2/4/2019  Yes Yes   Sig: Take 1 Tab by mouth daily. Facility-Administered Medications: None        Allergies:  No Known Allergies     Hospital Course: The patient underwent surgery. Complications:  None; patient tolerated the procedure well. Was taken to the PACU in stable condition and then transferred to the ortho floor. Perioperative Antibiotics:  Ancef     Postoperative Pain Management:  Oxycodone      DVT Prophylaxis: Aspirin 81    Postoperative transfusions:    Number of units banked PRBCs =   none     Post Op complications: none    Hemoglobin at discharge:    Lab Results   Component Value Date/Time    HGB 8.9 (L) 02/12/2019 02:34 AM    INR 1.0 02/04/2019 09:29 AM       Dressing was changed on POD # 1. Incision - clean, dry and intact. No significant erythema or swelling. Neurovascular exam found to be within normal limits. Wound appears to be healing without any evidence of infection. Physical Therapy started on the day following surgery and participated in bed mobility, transfers and ambulation. Required inpatient night due to pain control. Improved by POD#1. Gait:  Gait  Base of Support: Widened  Speed/Rayne: Pace decreased (<100 feet/min)  Gait Abnormalities: Decreased step clearance, Antalgic  Ambulation - Level of Assistance: Stand-by assistance  Distance (ft): 120 Feet (ft)                 Discharged to: Home with Home Health. Condition on Discharge:   stable    Discharge instructions:  - Anticoagulate with Aspirin 81 BID  - Take pain medications as prescribed  - Resume pre hospital diet      - Discharge activity: activity as tolerated  - Ambulate with assistive device as needed.   - Weight bearing status WBAT  - Wound Care Keep wound clean and dry. See discharge instruction sheet. -DISCHARGE MEDICATION LIST     Current Discharge Medication List      START taking these medications    Details   aspirin 81 mg chewable tablet Take 1 Tab by mouth two (2) times a day for 30 days. Qty: 60 Tab, Refills: 0      acetaminophen (TYLENOL) 325 mg tablet Take 2 Tabs by mouth every six (6) hours for 14 days. Qty: 112 Tab, Refills: 0      famotidine (PEPCID) 20 mg tablet Take 1 Tab by mouth two (2) times a day for 30 days. Qty: 60 Tab, Refills: 0      oxyCODONE IR (ROXICODONE) 5 mg immediate release tablet Take 1-2 Tabs by mouth every four (4) hours as needed. Max Daily Amount: 60 mg. If insurance prior auth./quantity restrictions apply, refer to and look up diagnosis code one prescription. Partial fill as needed is permitted. Please do not contact prescriber. Qty: 80 Tab, Refills: 0    Associated Diagnoses: Status post right hip replacement      polyethylene glycol (MIRALAX) 17 gram packet Take 1 Packet by mouth daily as needed (constipation) for up to 15 days. Qty: 15 Packet, Refills: 0      senna-docusate (PERICOLACE) 8.6-50 mg per tablet Take 1 Tab by mouth daily. Qty: 30 Tab, Refills: 0         CONTINUE these medications which have NOT CHANGED    Details   multivitamin (ONE A DAY) tablet Take 1 Tab by mouth daily. lisinopril-hydroCHLOROthiazide (PRINZIDE, ZESTORETIC) 20-12.5 mg per tablet Take 1 Tab by mouth two (2) times a day. atorvastatin (LIPITOR) 20 mg tablet Take 20 mg by mouth daily. docosahexanoic acid/epa (FISH OIL PO) Take 1,000 mg by mouth daily. gemfibrozil (LOPID) 600 mg tablet Take  by mouth two (2) times a day. METFORMIN HCL (METFORMIN PO) Take 1,000 mg by mouth two (2) times a day.          STOP taking these medications       aspirin delayed-release 81 mg tablet Comments:   Reason for Stopping:         meloxicam (MOBIC) 15 mg tablet Comments:   Reason for Stopping: per medical continuation form      -Follow up in office in 2 weeks      Signed:  Mariia Mike.  Genie Parish, BOBP-BC, ONP-C  Orthopaedic Nurse Practitioner    2/12/2019  11:46 AM

## 2019-02-12 NOTE — PROGRESS NOTES
Problem: Mobility Impaired (Adult and Pediatric) Goal: *Acute Goals and Plan of Care (Insert Text) Physical Therapy Goals Initiated 2/11/2019 1. Patient will move from supine to sit and sit to supine  in bed with independence within 4 days. 2. Patient will perform sit to stand with independence within 4 days. 3. Patient will ambulate with modified independence for 200 feet with the least restrictive device within 4 days. 4. Patient will ascend/descend 3 stairs with 1 handrail(s) with minimal assistance/contact guard assist within 4 days. 5. Patient will verbalize and demonstrate understanding of anterior precautions per protocol within 4 days. 6. Patient will perform hip home exercise program per protocol with supervision/set-up within 4 days. physical Therapy TREATMENT Seen 1109 to 1143 Patient: Deja Marmolejo (89 y.o. male) Date: 2/12/2019 Diagnosis: S/P hip replacement [Z96.649] <principal problem not specified> Procedure(s) (LRB): 
RIGHT TOTAL HIP ARTHROPLASTY ANTERIOR APPROACH, ACETABULAR BONE GRAFTING (Right) 1 Day Post-Op Precautions: Fall, WBAT Chart, physical therapy assessment, plan of care and goals were reviewed. ASSESSMENT: 
Chart reviewed and cleared by nurse to see for PT session. Patient in bed upon arrival.  Stated that his pain in the right hip was 4/10. Ice packs removed. Needed min assist for bed mobility. Good sitting balance. Stood to [employer] with CGA. Ambulated 200' with RW and CGA. Equal shortened step lengths. Went up/down 4 steps and did the car transfer with assist for BLE. Son in during the session. Ambulated back to his room. Pain level had decreased to to 3/10 by the end of the session. Up in chair for lunch at end of session. Lots of family in room when we returned. Looks like he will have a lot of support when he goes home. Home with HHPT. Progression toward goals: 
[x]    Improving appropriately and progressing toward goals []    Improving slowly and progressing toward goals 
[]    Not making progress toward goals and plan of care will be adjusted PLAN: 
Patient continues to benefit from skilled intervention to address the above impairments. Continue treatment per established plan of care. Discharge Recommendations:  Home Health Further Equipment Recommendations for Discharge:  rolling walker SUBJECTIVE:  
Patient stated It's so much better today.   Talking about his pain level. OBJECTIVE DATA SUMMARY:  
Critical Behavior: 
Neurologic State: Alert, Appropriate for age Orientation Level: Appropriate for age, Oriented X4 Cognition: Appropriate decision making, Follows commands Safety/Judgement: Awareness of environment Functional Mobility Training: 
Bed Mobility: 
Rolling: min assist for supine to sit Supine to Sit: Minimum assistance Transfers: 
Sit to Stand: CGA Stand to Sit: CGA Balance: 
Sitting: Intact Standing: Impaired Standing - Static: Constant support;Good Standing - Dynamic : (good/fair) Ambulation/Gait Training: 
Distance (ft): 225 Feet (ft) Assistive Device: Gait belt;Walker, rolling Ambulation - Level of Assistance: Stand-by assistance Gait Abnormalities: Decreased step clearance Base of Support: Widened Speed/Rayne: Pace decreased (<100 feet/min) Stairs: 
Number of Stairs Trained: 4 Stairs - Level of Assistance: Contact guard assistance(cues for which foot to step up and down with) Rail Use: Both  
 
Pain: 
Pain Scale 1: Numeric (0 - 10) Pain Intensity 1: 3 Pain Location 1: Hip Pain Orientation 1: Right Pain Description 1: Aching Pain Intervention(s) 1: Ice packs Activity Tolerance: Tolerated PT session well. Please refer to the flowsheet for vital signs taken during this treatment. After treatment:  
[x]    Patient left in no apparent distress sitting up in chair 
[]    Patient left in no apparent distress in bed 
[x]    Call bell left within reach [x]    Nursing notified 
[x]    Caregiver present 
[]    Bed alarm activated (not being used) COMMUNICATION/COLLABORATION:  
The patients plan of care was discussed with: Registered Nurse and Ortho NP Vida Reilly, PT Time Calculation: 34 mins

## 2019-02-12 NOTE — PROGRESS NOTES
Bedside and Verbal shift change report given to [employer] 70 (oncoming nurse) by Anthoney Dakins RN (offgoing nurse). Report included the following information SBAR, Kardex, OR Summary, Procedure Summary, Intake/Output and MAR.

## 2019-02-12 NOTE — PROGRESS NOTES
Spiritual Care Partner Volunteer visited patient in Ortho on February 12, 2019. Documented by: 
 
VIRGIL Nash, [facility],  [facility]  Paging Service  [phone])

## 2019-02-12 NOTE — DISCHARGE INSTRUCTIONS
[facility]  Surgery: Total Hip Replacement  Surgeon:   Janet Hinson MD  Surgery Date:  2/11/2019     To relieve pain:   Use ice/gel packs.  Put the ice pack directly over the wound, or anywhere you are hurting or swollen.  To control pain and swelling, keep ice on regularly, especially after physical activity.  The packs should stay cold for 3-4 hours. When it is not cold anymore, rotate with the packs in the freezer.  Elevate your leg. This will also keep swelling down.  Rest for at least 20 minutes between activity or exercises.  To keep track of your pain medications, write down what you take and when you take it.  The last dose of pain medication you got in the hospital was:       Medication    Dose    Date & Time           Choose your medications based on the pain scale below:     To keep your pain under control, take Tylenol every 6 hours for 14 days - even if you feel like you dont need it.  For mild to moderate pain (4-6 on pain scale), take one pain pill every 3-4 hours as needed.  For severe pain (7-10 on pain scale), take two pain pills every 3-4 hours as needed.  To prevent nausea, take your pain medications with food. Pain Scale              As your pain lessens:     Slowly start taking less pain medication. You may do this by waiting longer between doses or by taking smaller doses.  Stop using the pain medications as soon as you no longer need it, usually in 2-3 weeks.  Aspirin   To prevent blood clots, you will need to take Aspirin 81 mg twice a day for 30 days.  To prevent stomach upset or bleeding:   Do not take non-steroidal anti-inflammatory medications (Ibuprofen, Advil, Motrin, Naproxen, etc.)    Take Pepcid 20 mg twice a day, or a similar home medication, while you are taking a blood thinner.              OPSITE (Honeycomb dressing)     Keep your clear, waterproof dressing in place for 5-7 days after your leave the hospital.     If you are still having drainage, you will need to change your dressing in 5-7 days. You will be given one extra dressing to use at home.  If there is no more drainage from the wound, you may leave it open to the air. OPSITE DRESSING INSTRUCTIONS                  PRINEO DRESSING INSTRUCTIONS       Prineo is a type of skin glue and mesh that is keeping your wound together.  Leave this covering alone. Do not peel it off.  Do not apply oils or lotions over it.  You may shower with this dressing but do not soak it. Gently pat your wound dry when you get out of the shower.  DO NOTs:   Do not rub your surgical wound   Do not put lotion or oils on your wound.  Do not take a tub bath or go swimming until your doctor says it is ok.  You may shower with this dressing over your wound.  After showering pat the dressing dry.  If you have staples a home health nurse will remove them in about 10 days.  To increase and promote healing:     Stop Smoking (or at least cut back on       Smoking).  Eat a well-balanced diet (high in protein       and vitamin C).  If you have a poor appetite, drink Ensure, Glucerna, or [place] Instant Breakfast for the next 30 days.  If you are diabetic, you should control your blood         sugars to prevent infection and help your wound         to heal.                         To prevent constipation, stay active and drink plenty of fluid.  While using pain medications, you should also take stool softeners and laxatives, such as Pericolace       and Miralax.  If you are having too many bowel       Movements, then you may need       to stop taking the laxatives.      You should have a bowel movement 3-4 days        after surgery and then at least every other day while       on pain medication.  To improve your recovery, you must be active!  Use your walker and take short walks         (in your home). about every 2 hours during the day.  Try to increase how far you walk each day.  You can put as much weight on your leg as you can tolerate while walking.  Home health physical therapy will come to your       home the day after you leave the hospital.  The       therapist will visit about 4 times over the next couple of       weeks to teach you how to get out of bed, to safely walk       in your home, and to do your exercises.  NO DRIVING until your surgeon tells you it is ok.  You can return to work when cleared by a physician.  Please call your physician immediately if you have:     Constant bleeding from your wound.  Increasing redness or swelling around your wound (Some warmth, bruising and swelling is normal).  Change in wound drainage (increase in amount, color, or bad smell).  Change in mental status (unusual behavior   Temperature over 101.5 degrees Fahrenheit      Pain or redness in the calf (back of your lower leg - see picture)     Increased swelling of the thigh, ankle, calf, or foot.  Emergency: CALL 911 if you have:     Shortness of breath     Chest pain when you cough or taking a deep breath     Please call your surgeons office at [phone]  for a follow up appointment. _   You should call as soon as you get home or the next day after discharge. Ask to make an appointment in 2 weeks.  If you have questions or concerns during normal business hours, you may reach Dr. Marion Maki' Team at [phone].

## 2019-02-12 NOTE — OP NOTES
Delfino Mckinney MD - Adult Reconstruction and Total Joint Replacement    Casper Obrien - MRN [id number] - : 1955 (62 y.o.)      Date: 2019    Pre-operative Diagnosis: Right Hip DJD , Acetabular defect    Post-operative Diagnosis: same     Procedure:  (1) Right Total Hip Arthroplasty, Direct Anterior Approach    (2) Intraoperative Fluoroscopy    (3) Acetabular bonegrafting     Implants Used:   Implant Name Type Inv. Item Serial No.  Lot No. LRB No. Used Action   SHELL ACET M-HLE 54MM CAROL -- INTEGRIP REV - [id number]  SHELL ACET M-HLE 54MM CAROL -- INTEGRIP REV [id number] [employer] N/A Right 1 Implanted   ALTEON BONE SCREW 6.5MM DIAMETER X 40MM LENGTH   [id number] [employer] N/A Right 1 Implanted   ALTEON BONE SCREW 6.5MM DIAMTER X 30MM LENGTH   [id number] [employer] N/A Right 1 Implanted   LINER ACET NEUT 36MM GRP 2 -- NOVATION - [id number]  LINER ACET NEUT 36MM GRP 2 -- NOVATION [id number] [employer] N/A Right 1 Implanted   STEM ELMNT W/HA STD OFFST SZ13 -- COLLARED - [id number]  STEM ELMNT W/HA STD OFFST HI65 -- COLLARED [id number] [employer] N/A Right 1 Implanted   HEAD FEM CER 36MM OD -3.5MM -- BIOLOX DELTA - [id number]  HEAD FEM CER 36MM OD -3.5MM -- BIOLOX DELTA [id number] [employer] N/A Right 1 Implanted       Anesthesia: GETA + local    Surgeon: Delfino Mckinney     Assist: REMBERTO Serrano (Performing all or most of the following assistant-at-surgery services including but not limited to: proper patient positioning, sterile/prep and draping, placement of instruments/trackers, operative exposure, minor portions of bone / soft tissue excision, final irrigation and debridement, deep and superficial closure, application of final dressings)    EBL: 300cc     Drains: none     Specimens: none     Complications: none     Condition: stable to PACU     Brief History: Longstanding hip pain, unresponsive to conservative treatment.  The risks, benefits, and alternatives to total hip replacement were thoroughly explained. The patient understood no guarantees could be given about the outcome of the procedure and wished to proceed. Description of Procedure: After being identified in the preoperative holding area and having their operative site marked, the patient was brought back to the operating room where they underwent anesthesia to good effect. They were  placed in the supine position with a bump under the hip. The operative extremity was then prepped and draped in the usual fashion using sterile technique. Preoperative antibiotics had been administered. An appropriate time-out was performed. An incision was made 2 fingerbreadths lateral and distal to the ASIS. The skin flaps were developed down to the tensor fascia. This was divided sharply. Blunt dissection was taken medially over the tensor muscle. Retractors were placed superior and inferior to the femoral neck. The anterior fat pad was debrided. Circumflex vessels were identified and cauterized. A provisional neck cut was performed. The head was left in place in the acetabulum/ superior acetabular defect. This was trimmed flush with the face of the true acetabulum with a saw. Two guide pins were placed from lateral to medial to hold the femoral head graft into position. We sequentially reamed for the acetabular shell at its native orientation. This was placed in the appropriate abduction and version. Position was confirmed by fluoroscopy . Two screws were placed through the acetabular shell and the femoral head bone graft into the ilium. The liner was then impacted into the locking mechanism. We then turned our attention to the femur. Elevators were used to gain access to the proximal femur. Soft tissue releases were performed as necessary. The lateralizer was utilized. We then sequentially broached up to the final size trial. We placed a trial neck and head and had excellent restoration of leg length and offset with good soft tissue balance.  We selected these as our final implants. Final components were inserted and the hip was reduced after thorough lavage. Restoration of appropriate leg length was confirmed by fluoroscopy. We again irrigated. The tensor fascia was closed. Periarticular injection was performed. Skin closure was performed in layers. A sterile dressing was applied. The patient was awakened, moved to the stretcher, and taken to the recovery room in stable condition. At the conclusion of the procedure, all counts were correct. There were no immediate complications. Additional Procedure:   Date: 2/11/2019    Preoperative diagnosis: RIGHT HIP OA    Postoperative diagnosis: RIGHT HIP OA    Procedure performed: Procedure(s):  RIGHT TOTAL HIP ARTHROPLASTY ANTERIOR APPROACH, ACETABULAR BONE GRAFTING    Anesthesia: General    Surgeon(s) and Role:     Liz Villasenor MD - Primary    Assistant: REMBERTO Walters      This describes the use of intraoperative fluoroscopy. Fluoroscopic imaging was utilized in orthogonal planes as well as using live technique for all phases of the procedure, described separately in the operative report, including hardware placement.     Seymour Herrera MD

## 2019-02-19 ENCOUNTER — HOSPITAL ENCOUNTER (INPATIENT)
Age: 64
LOS: 6 days | Discharge: HOME | DRG: 467 | End: 2019-02-25
Attending: ORTHOPAEDIC SURGERY | Admitting: HOSPITALIST
Payer: COMMERCIAL

## 2019-02-19 ENCOUNTER — ANESTHESIA EVENT (OUTPATIENT)
Dept: SURGERY | Age: 64
End: 2019-02-19

## 2019-02-19 ENCOUNTER — APPOINTMENT (OUTPATIENT)
Dept: NON INVASIVE DIAGNOSTICS | Age: 64
DRG: 467 | End: 2019-02-19
Attending: NURSE PRACTITIONER
Payer: COMMERCIAL

## 2019-02-19 ENCOUNTER — ANESTHESIA (OUTPATIENT)
Dept: SURGERY | Age: 64
End: 2019-02-19

## 2019-02-19 DIAGNOSIS — M97.01XA PERIPROSTHETIC FRACTURE AROUND INTERNAL PROSTHETIC RIGHT HIP JOINT, INITIAL ENCOUNTER (HCC): Primary | ICD-10-CM

## 2019-02-19 PROBLEM — M97.8XXA PERIPROSTHETIC FRACTURE AROUND INTERNAL PROSTHETIC HIP JOINT: Status: ACTIVE | Noted: 2019-02-19

## 2019-02-19 PROBLEM — Z96.649 PERIPROSTHETIC FRACTURE AROUND INTERNAL PROSTHETIC HIP JOINT: Status: ACTIVE | Noted: 2019-02-19

## 2019-02-19 PROBLEM — I48.91 ATRIAL FIBRILLATION (HCC): Status: ACTIVE | Noted: 2019-02-19

## 2019-02-19 PROBLEM — I48.91 A-FIB (HCC): Status: ACTIVE | Noted: 2019-02-19

## 2019-02-19 LAB
ANION GAP BLD CALC-SCNC: 16 MMOL/L (ref 10–20)
ANION GAP SERPL CALC-SCNC: 10 MMOL/L (ref 5–15)
BNP SERPL-MCNC: 3745 PG/ML
BUN BLD-MCNC: 54 MG/DL (ref 9–20)
BUN SERPL-MCNC: 57 MG/DL (ref 6–20)
BUN/CREAT SERPL: 31 (ref 12–20)
CA-I BLD-MCNC: 1.12 MMOL/L (ref 1.12–1.32)
CALCIUM SERPL-MCNC: 9.1 MG/DL (ref 8.5–10.1)
CHLORIDE BLD-SCNC: 103 MMOL/L (ref 98–107)
CHLORIDE SERPL-SCNC: 102 MMOL/L (ref 97–108)
CO2 BLD-SCNC: 23 MMOL/L (ref 21–32)
CO2 SERPL-SCNC: 23 MMOL/L (ref 21–32)
CREAT BLD-MCNC: 1.8 MG/DL (ref 0.6–1.3)
CREAT SERPL-MCNC: 1.84 MG/DL (ref 0.7–1.3)
ECHO AV PEAK GRADIENT: 7 MMHG
ECHO AV PEAK VELOCITY: 132.17 CM/S
ECHO EST RA PRESSURE: 10 MMHG
ECHO LV EDV A4C: 109.9 ML
ECHO LV EDV INDEX A4C: 57 ML/M2
ECHO LV EJECTION FRACTION A4C: 56 %
ECHO LV ESV A4C: 48.8 ML
ECHO LV ESV INDEX A4C: 25.3 ML/M2
ECHO LV INTERNAL DIMENSION DIASTOLIC MMODE: 0 CM
ECHO LV INTERNAL DIMENSION DIASTOLIC: 2.91 CM (ref 4.2–5.9)
ECHO LV INTERNAL DIMENSION SYSTOLIC: 1.81 CM
ECHO LV IVSD MMODE: 1.57 CM
ECHO LV IVSD: 1.54 CM (ref 0.6–1)
ECHO LV MASS 2D: 164.1 G (ref 88–224)
ECHO LV MASS INDEX 2D: 85.1 G/M2 (ref 49–115)
ECHO LV POSTERIOR WALL DIASTOLIC MMODE: 2.7 CM
ECHO LV POSTERIOR WALL DIASTOLIC: 1.36 CM (ref 0.6–1)
ECHO LVOT PEAK GRADIENT: 3 MMHG
ECHO LVOT PEAK VELOCITY: 86.2 CM/S
ECHO PULMONARY ARTERY SYSTOLIC PRESSURE (PASP): 42.6 MMHG
ECHO RIGHT VENTRICULAR SYSTOLIC PRESSURE (RVSP): 42.6 MMHG
ECHO RV INTERNAL DIMENSION: 4.32 CM
ECHO TV REGURGITANT MAX VELOCITY: 285.69 CM/S
ECHO TV REGURGITANT PEAK GRADIENT: 32.6 MMHG
GLUCOSE BLD STRIP.AUTO-MCNC: 111 MG/DL (ref 65–100)
GLUCOSE BLD STRIP.AUTO-MCNC: 164 MG/DL (ref 65–100)
GLUCOSE BLD STRIP.AUTO-MCNC: 80 MG/DL (ref 65–100)
GLUCOSE BLD-MCNC: 103 MG/DL (ref 65–100)
GLUCOSE SERPL-MCNC: 98 MG/DL (ref 65–100)
HCT VFR BLD CALC: 30 % (ref 36.6–50.3)
MAGNESIUM SERPL-MCNC: 2.2 MG/DL (ref 1.6–2.4)
POTASSIUM BLD-SCNC: 4.6 MMOL/L (ref 3.5–5.1)
POTASSIUM SERPL-SCNC: 4.5 MMOL/L (ref 3.5–5.1)
SERVICE CMNT-IMP: ABNORMAL
SERVICE CMNT-IMP: NORMAL
SODIUM BLD-SCNC: 137 MMOL/L (ref 136–145)
SODIUM SERPL-SCNC: 135 MMOL/L (ref 136–145)
TROPONIN I SERPL-MCNC: <0.05 NG/ML
TSH SERPL DL<=0.05 MIU/L-ACNC: 4.43 UIU/ML (ref 0.36–3.74)

## 2019-02-19 PROCEDURE — 74011000258 HC RX REV CODE- 258: Performed by: NURSE PRACTITIONER

## 2019-02-19 PROCEDURE — 74011250636 HC RX REV CODE- 250/636

## 2019-02-19 PROCEDURE — 83880 ASSAY OF NATRIURETIC PEPTIDE: CPT

## 2019-02-19 PROCEDURE — 83735 ASSAY OF MAGNESIUM: CPT

## 2019-02-19 PROCEDURE — 65660000000 HC RM CCU STEPDOWN

## 2019-02-19 PROCEDURE — 74011250636 HC RX REV CODE- 250/636: Performed by: INTERNAL MEDICINE

## 2019-02-19 PROCEDURE — 84484 ASSAY OF TROPONIN QUANT: CPT

## 2019-02-19 PROCEDURE — 84443 ASSAY THYROID STIM HORMONE: CPT

## 2019-02-19 PROCEDURE — 74011636637 HC RX REV CODE- 636/637: Performed by: HOSPITALIST

## 2019-02-19 PROCEDURE — 74011250637 HC RX REV CODE- 250/637: Performed by: HOSPITALIST

## 2019-02-19 PROCEDURE — 74011250636 HC RX REV CODE- 250/636: Performed by: NURSE PRACTITIONER

## 2019-02-19 PROCEDURE — 74011250636 HC RX REV CODE- 250/636: Performed by: ANESTHESIOLOGY

## 2019-02-19 PROCEDURE — 82962 GLUCOSE BLOOD TEST: CPT

## 2019-02-19 PROCEDURE — 93306 TTE W/DOPPLER COMPLETE: CPT

## 2019-02-19 PROCEDURE — 80047 BASIC METABLC PNL IONIZED CA: CPT

## 2019-02-19 PROCEDURE — 93005 ELECTROCARDIOGRAM TRACING: CPT

## 2019-02-19 PROCEDURE — 74011250636 HC RX REV CODE- 250/636: Performed by: HOSPITALIST

## 2019-02-19 PROCEDURE — 80048 BASIC METABOLIC PNL TOTAL CA: CPT

## 2019-02-19 PROCEDURE — 36415 COLL VENOUS BLD VENIPUNCTURE: CPT

## 2019-02-19 PROCEDURE — 77030020782 HC GWN BAIR PAWS FLX 3M -B

## 2019-02-19 RX ORDER — AMOXICILLIN 250 MG
1 CAPSULE ORAL DAILY
Status: DISCONTINUED | OUTPATIENT
Start: 2019-02-20 | End: 2019-02-19

## 2019-02-19 RX ORDER — ATORVASTATIN CALCIUM 20 MG/1
20 TABLET, FILM COATED ORAL DAILY
Status: DISCONTINUED | OUTPATIENT
Start: 2019-02-20 | End: 2019-02-25 | Stop reason: HOSPADM

## 2019-02-19 RX ORDER — FACIAL-BODY WIPES
10 EACH TOPICAL DAILY PRN
Status: DISCONTINUED | OUTPATIENT
Start: 2019-02-19 | End: 2019-02-24 | Stop reason: SDUPTHER

## 2019-02-19 RX ORDER — AMOXICILLIN 250 MG
1 CAPSULE ORAL DAILY
Status: DISCONTINUED | OUTPATIENT
Start: 2019-02-19 | End: 2019-02-21 | Stop reason: SDUPTHER

## 2019-02-19 RX ORDER — INSULIN LISPRO 100 [IU]/ML
INJECTION, SOLUTION INTRAVENOUS; SUBCUTANEOUS EVERY 6 HOURS
Status: DISCONTINUED | OUTPATIENT
Start: 2019-02-19 | End: 2019-02-19

## 2019-02-19 RX ORDER — SODIUM CHLORIDE 9 MG/ML
100 INJECTION, SOLUTION INTRAVENOUS CONTINUOUS
Status: DISCONTINUED | OUTPATIENT
Start: 2019-02-19 | End: 2019-02-21

## 2019-02-19 RX ORDER — SODIUM CHLORIDE 9 MG/ML
125 INJECTION, SOLUTION INTRAVENOUS CONTINUOUS
Status: DISCONTINUED | OUTPATIENT
Start: 2019-02-19 | End: 2019-02-19 | Stop reason: SDUPTHER

## 2019-02-19 RX ORDER — IBUPROFEN 200 MG
1 TABLET ORAL EVERY 24 HOURS
Status: DISCONTINUED | OUTPATIENT
Start: 2019-02-19 | End: 2019-02-23

## 2019-02-19 RX ORDER — OXYCODONE HYDROCHLORIDE 5 MG/1
5 TABLET ORAL
Status: DISCONTINUED | OUTPATIENT
Start: 2019-02-19 | End: 2019-02-21

## 2019-02-19 RX ORDER — POLYETHYLENE GLYCOL 3350 17 G/17G
17 POWDER, FOR SOLUTION ORAL DAILY
Status: DISCONTINUED | OUTPATIENT
Start: 2019-02-19 | End: 2019-02-21 | Stop reason: SDUPTHER

## 2019-02-19 RX ORDER — SODIUM CHLORIDE, SODIUM LACTATE, POTASSIUM CHLORIDE, CALCIUM CHLORIDE 600; 310; 30; 20 MG/100ML; MG/100ML; MG/100ML; MG/100ML
50 INJECTION, SOLUTION INTRAVENOUS CONTINUOUS
Status: DISCONTINUED | OUTPATIENT
Start: 2019-02-19 | End: 2019-02-19

## 2019-02-19 RX ORDER — ENOXAPARIN SODIUM 100 MG/ML
1 INJECTION SUBCUTANEOUS EVERY 12 HOURS
Status: DISCONTINUED | OUTPATIENT
Start: 2019-02-19 | End: 2019-02-20

## 2019-02-19 RX ORDER — MAGNESIUM SULFATE 100 %
4 CRYSTALS MISCELLANEOUS AS NEEDED
Status: DISCONTINUED | OUTPATIENT
Start: 2019-02-19 | End: 2019-02-25 | Stop reason: HOSPADM

## 2019-02-19 RX ORDER — SODIUM CHLORIDE 9 MG/ML
500 INJECTION, SOLUTION INTRAVENOUS ONCE
Status: COMPLETED | OUTPATIENT
Start: 2019-02-19 | End: 2019-02-19

## 2019-02-19 RX ORDER — DEXTROSE 50 % IN WATER (D50W) INTRAVENOUS SYRINGE
12.5-25 AS NEEDED
Status: DISCONTINUED | OUTPATIENT
Start: 2019-02-19 | End: 2019-02-25 | Stop reason: HOSPADM

## 2019-02-19 RX ORDER — INSULIN LISPRO 100 [IU]/ML
INJECTION, SOLUTION INTRAVENOUS; SUBCUTANEOUS
Status: DISCONTINUED | OUTPATIENT
Start: 2019-02-19 | End: 2019-02-22

## 2019-02-19 RX ORDER — GEMFIBROZIL 600 MG/1
300 TABLET, FILM COATED ORAL
Status: DISCONTINUED | OUTPATIENT
Start: 2019-02-19 | End: 2019-02-25 | Stop reason: HOSPADM

## 2019-02-19 RX ORDER — NALOXONE HYDROCHLORIDE 0.4 MG/ML
0.4 INJECTION, SOLUTION INTRAMUSCULAR; INTRAVENOUS; SUBCUTANEOUS AS NEEDED
Status: DISCONTINUED | OUTPATIENT
Start: 2019-02-19 | End: 2019-02-21 | Stop reason: SDUPTHER

## 2019-02-19 RX ORDER — SODIUM CHLORIDE 0.9 % (FLUSH) 0.9 %
5-40 SYRINGE (ML) INJECTION AS NEEDED
Status: DISCONTINUED | OUTPATIENT
Start: 2019-02-19 | End: 2019-02-21

## 2019-02-19 RX ORDER — ONDANSETRON 2 MG/ML
4 INJECTION INTRAMUSCULAR; INTRAVENOUS
Status: DISCONTINUED | OUTPATIENT
Start: 2019-02-19 | End: 2019-02-25 | Stop reason: HOSPADM

## 2019-02-19 RX ORDER — GUAIFENESIN 100 MG/5ML
81 LIQUID (ML) ORAL 2 TIMES DAILY
Status: DISCONTINUED | OUTPATIENT
Start: 2019-02-19 | End: 2019-02-25 | Stop reason: HOSPADM

## 2019-02-19 RX ORDER — FAMOTIDINE 20 MG/1
20 TABLET, FILM COATED ORAL
Status: DISCONTINUED | OUTPATIENT
Start: 2019-02-20 | End: 2019-02-25 | Stop reason: HOSPADM

## 2019-02-19 RX ORDER — ACETAMINOPHEN 325 MG/1
650 TABLET ORAL
Status: DISCONTINUED | OUTPATIENT
Start: 2019-02-19 | End: 2019-02-25 | Stop reason: HOSPADM

## 2019-02-19 RX ORDER — SODIUM CHLORIDE 0.9 % (FLUSH) 0.9 %
5-40 SYRINGE (ML) INJECTION EVERY 8 HOURS
Status: DISCONTINUED | OUTPATIENT
Start: 2019-02-19 | End: 2019-02-21

## 2019-02-19 RX ORDER — POLYETHYLENE GLYCOL 3350 17 G/17G
17 POWDER, FOR SOLUTION ORAL DAILY
Status: DISCONTINUED | OUTPATIENT
Start: 2019-02-20 | End: 2019-02-19

## 2019-02-19 RX ADMIN — Medication 10 ML: at 21:13

## 2019-02-19 RX ADMIN — ENOXAPARIN SODIUM 80 MG: 80 INJECTION, SOLUTION INTRAVENOUS; SUBCUTANEOUS at 16:46

## 2019-02-19 RX ADMIN — SODIUM CHLORIDE, SODIUM LACTATE, POTASSIUM CHLORIDE, AND CALCIUM CHLORIDE 50 ML/HR: 600; 310; 30; 20 INJECTION, SOLUTION INTRAVENOUS at 09:33

## 2019-02-19 RX ADMIN — SODIUM CHLORIDE 100 ML/HR: 900 INJECTION, SOLUTION INTRAVENOUS at 11:20

## 2019-02-19 RX ADMIN — SODIUM CHLORIDE 100 ML/HR: 900 INJECTION, SOLUTION INTRAVENOUS at 21:13

## 2019-02-19 RX ADMIN — ASPIRIN 81 MG 81 MG: 81 TABLET ORAL at 15:27

## 2019-02-19 RX ADMIN — GEMFIBROZIL 300 MG: 600 TABLET ORAL at 16:43

## 2019-02-19 RX ADMIN — STANDARDIZED SENNA CONCENTRATE AND DOCUSATE SODIUM 1 TABLET: 8.6; 5 TABLET, FILM COATED ORAL at 15:28

## 2019-02-19 RX ADMIN — POLYETHYLENE GLYCOL 3350 17 G: 17 POWDER, FOR SOLUTION ORAL at 15:28

## 2019-02-19 RX ADMIN — INSULIN LISPRO 2 UNITS: 100 INJECTION, SOLUTION INTRAVENOUS; SUBCUTANEOUS at 18:38

## 2019-02-19 RX ADMIN — SODIUM CHLORIDE 500 ML: 900 INJECTION, SOLUTION INTRAVENOUS at 10:00

## 2019-02-19 RX ADMIN — ASPIRIN 81 MG 81 MG: 81 TABLET ORAL at 18:38

## 2019-02-19 RX ADMIN — AMIODARONE HYDROCHLORIDE 1 MG/MIN: 50 INJECTION, SOLUTION INTRAVENOUS at 09:23

## 2019-02-19 RX ADMIN — AMIODARONE HYDROCHLORIDE 150 MG: 50 INJECTION, SOLUTION INTRAVENOUS at 09:08

## 2019-02-19 RX ADMIN — Medication 10 ML: at 15:29

## 2019-02-19 NOTE — PROGRESS NOTES
1200 - TRANSFER - IN REPORT:    Verbal report received from Deidre(name) on Lew Naylor  being received from (unit) for change in patient condition(Cardiac rhythm)      Report consisted of patients Situation, Background, Assessment and   Recommendations(SBAR). Information from the following report(s) SBAR, Kardex, Intake/Output, Accordion and Recent Results was reviewed with the receiving nurse. Opportunity for questions and clarification was provided. Assessment completed upon patients arrival to unit and care assumed.

## 2019-02-19 NOTE — CONSULTS
[address], [place], [address] [facility]  [address] [facility]     Date of  Admission: 2/19/2019  6:54 AM     Admission type:Elective    Consult for: new onset afib with RVR  Consult by: anesthesiology     Subjective:     Colin Rosales is a 62 y.o. male admitted for Atrial fibrillation ([facility] [address] [I48.91], Periprosthetic fracture around internal prosthetic hip joint [M97. [place], [address]. Recent discharge s/p right hip replacement, back in for further surgical repair on periprosthetic fracture. Patient presented to Preop for surgery this AM.  Once placed on telemetry was found to have afib with RVR at 130-140s. Patient denies palpitations, SOB, chest discomfort, dizziness/lightheadedness. Denies previous hx of afib or other cardiac hx.    +tob abuse, +ETOH (6 beers a day and occassional liqour) - has not drank since 2/4/19. ECG afib with RVR, troponin pending. Patient is hypotensive, receiving IV fluids  Cardiac risk factors: smoking/ tobacco exposure, sedentary life style, male gender, hypertension.       Patient Active Problem List    Diagnosis Date Noted    Atrial fibrillation ([address] 02/19/2019    Periprosthetic fracture around internal prosthetic hip joint 02/19/2019    S/P hip replacement 02/11/2019      Weston Cifuentes MD  Past Medical History:   Diagnosis Date    Arthritis     CAD (coronary artery disease)     lipids    Chronic obstructive pulmonary disease ([address]     Chronic pain     Diabetes ([address]     Hypertension     Infectious disease       Social History     Socioeconomic History    Marital status: SINGLE     Spouse name: Not on file    Number of children: Not on file    Years of education: Not on file    Highest education level: Not on file   Tobacco Use    Smoking status: Current Every Day Smoker     Packs/day: 1.50     Years: 30.00     Pack years: 45.00    Smokeless tobacco: Never Used    Tobacco comment: 2 ppd for years/cutting back   Substance and Sexual Activity    Alcohol use: Yes     Alcohol/week: 2.4 oz     Types: 4 Cans of beer per week     Comment: weekends    Drug use: No     No Known Allergies   Family History   Problem Relation Age of Onset    Heart Disease Mother     Heart Disease Father     Alzheimer Father     Heart Disease Brother       Current Facility-Administered Medications   Medication Dose Route Frequency    amiodarone (CORDARONE) 375 mg/250 mL D5W infusion  1 mg/min IntraVENous CONTINUOUS    Followed by   Fernando amiodarone (CORDARONE) 375 mg/250 mL D5W infusion  0.5 mg/min IntraVENous CONTINUOUS    lactated Ringers infusion  50 mL/hr IntraVENous CONTINUOUS        Review of Symptoms:   11 systems reviewed, negative other than as stated in the HPI        Objective:      Visit Vitals  BP (!) 83/55 (BP 1 Location: Right arm, BP Patient Position: At rest)   Pulse (!) 111   Temp 98.1 °F (36.7 °C)   Resp 18   Ht 5' 6\" (1.676 m)   Wt 83.3 kg (183 lb 10.3 oz)   SpO2 97%   BMI 29.64 kg/m²       Physical:   General: older  male in no acute distress  Heart: irr, irr, no m/S3/JVD, no carotid bruits   Lungs: clear   Abdomen: Soft, +BS, NTND   Extremities: LE ting +DP/PT, no edema   Neurologic: Grossly normal    Data Review:   No results for input(s): WBC, HGB, HCT, PLT, HGBEXT, HCTEXT, PLTEXT in the last 72 hours.   Recent Labs     02/19/19  0835   *   K 4.5      CO2 23   GLU 98   BUN 57*   CREA 1.84*   CA 9.1   MG 2.2       Recent Labs     02/19/19  0835   TROIQ <0.05       No intake or output data in the 24 hours ending 02/19/19 1017     Cardiographics    Telemetry: afib with RVR  ECG: Afib with RVR  Echocardiogram:  pending         Assessment:       Active Problems:    Atrial fibrillation ([address] (2/19/2019)      Periprosthetic fracture around internal prosthetic hip joint (2/19/2019)         Plan:     Afib with RVR:  New onset, or first documented as patient cannot feel and tell that he is in afib. With low BP, unable to start AV archana blocking agents, starting IV amio bolus and infusion for rate control. CHADS2 vasc score: 2, start Lovenox 1mg/1kg q24h due to new ARF  If patient remains in afib, will plan MORRIS and cardioversion for AM.  Keep NPO in AM  Echo preliminary report shows EF normal, RV dil. Hypotension:  R/t afib with RVR rate. And patient took AM meds of ACE/HCTZ, and oxycodone  Agree with IV NS for BP support and hydration with ARF. Hold any antiHTN meds for now. Thank you for consulting INDRA Rogers NP     Patient seen and examined by me with nurse practitioner Gianna Stout. I personally performed all components of the history, physical, and medical decision making and agree with the assessment and plan with minor modifications as noted. Surgery for hip fracture delayed due to paroxysmal atrial fibrillation with RVR and hypotension. Treating with IV fluids and IV amiodarone. Cover with Lovenox treatment dose if okay from a surgical standpoint. Can consider MORRIS and cardioversion if does not convert to normal sinus rhythm. Will keep n.p.o. after midnight in case of need for MORRIS/cardioversion. Echo shows normal LVEF, mild RV dilatation.   He states he thinks he has been told he had COPD in the past.

## 2019-02-19 NOTE — PROGRESS NOTES
Reason for Readmission:     Atrial Fibrillation          RRAT Score and Risk Level:   7 - low risk        Level of Readmission:    1      Care Conference scheduled:   Not at this time       Resources/supports as identified by patient/family:   Family supportive        Top Challenges facing patient (as identified by patient/family and CM): Finances/Medication cost?     No issues - has insurance -[employer]   Transportation    Pt drives and family can transport     Support system or lack thereof? Good support from family    Living arrangements? Lives with his son and his son's girlfriend in a 1 story home with 3 steps to the entrance  Self-care/ADLs/Cognition? Independent with his ADL's and IADL's/ alert & oriented           Current Advanced Directive/Advance Care Plan:  Full Code/ not on file            Plan for utilizing home health:   Pending needs at discharge              Likelihood of additional readmission:   Low risk              Transition of Care Plan:    Based on readmission, the patient's previous Plan of Care   has been evaluated and/or modified. The current Transition of Care Plan is:  Home with f/u appts and pending needs at discharge. Pt is a Level 1 Readmit with a recent discharge from [facility] on 2/12/19 and pt went home with [place] from [facility] [employer]. Demographic information verified and all is correct. Pt lives with his son and his son's girlfriend. Prior to admission, pt was independent with his ADL's and IADL's. Pt was receiving PT home health from his last admission and received a rolling walker. Preferred pharmacy is [facility]. Pt's family can transport pt home by car at discharge. CM will continue to follow pt for discharge planning needs. Care Management Interventions  PCP Verified by CM: Yes  Mode of Transport at Discharge:  Other (see comment)(pt's family can transport pt home by car)  Transition of Care Consult (CM Consult): Discharge Planning  Discharge Durable Medical Equipment: No(rolling walker)  Physical Therapy Consult: No  Occupational Therapy Consult: No  Speech Therapy Consult: No  Current Support Network:  Other, Own Home(lives with son in a 1 story home with 3 steps to the entrance)  Confirm Follow Up Transport: Family  Discharge Location  Discharge Placement: [address], [address] [facility]

## 2019-02-19 NOTE — H&P
Hospitalist Admission Note    NAME: Miko Frances   :  1955   MRN:  [id number]     Date/Time:  2019 10:29 AM    Patient PCP: Matt Guadalupe MD  ______________________________________________________________________  Given the patient's current clinical presentation, I have a high level of concern for decompensation if discharged from the emergency department. Complex decision making was performed, which includes reviewing the patient's available past medical records, laboratory results, and x-ray films. My assessment of this patient's clinical condition and my plan of care is as follows. Assessment / Plan:  Afib RVR new onset  Hypotension   -hypotension is likely due to afib rvr/ took bp meds this am/ dehydrated   -admit to step down unit  -continue aggressive IVF  Holding home BP meds   -Afib management per cardiology  Keep NPO for possible cardioversion later on today    TSH is high side, follow free t4   -if BP remain low may consider use of don gtt if ok with cardiology   -echo done, result pending  -AC: will be per cardiology/ortho in view of pending surgery      Hyponatremia / JUDAH  -baseline Cr 1.1, admission 1.84  Likely prerenal in origine   -Aggressive hydration with NS. Watch cr/electrolytes/fluid statud closely. -holding hctz/lisinopril   -Adjust all medications  especially antibiotic therapy to GFR . Avoid nephrotoxic drugs.  -If not improving consider further evaluation with renal US and nephrology consult      R hip broken hardware   -s/p  R STARLA by Dr Jeff Bonilla which was broken during PT   -consulting ortho for planned surgery  -pain management with bowel regiment     DM type II  -holding metformin  Watch for hypoglycemia with current NPO   -c/w SS     Tobacco use  -pt was educated on smocking cessation  -nicotine patch     Hyperlipidemia, cont statin  GERD, cont PPI         Code Status: Full code   Surrogate Decision Maker: son     DVT Prophylaxis: holding AC anticipating ortho surgery   GI Prophylaxis: not indicated    Baseline: his son lives with him       Subjective:   CHIEF COMPLAINT: afib     HISTORY OF PRESENT ILLNESS:     Judit Clark is a 62 y.o.  male who presents with above complaint. Pt with h/o R STARLA done on 2/11 by Dr Marion Maki. He was getting PT at home. Last week while working with PT his leg was turned outward. Since that time his pain got worse and he was not able to ambulate. Pt was supposed to have repair of broken prothesis today. He was found in Afib RVR when he was connected to tele monitoring in holding area. His BP on arrival was at 89/67. Cardiology was consulted. Pt was started on amio gtt. BP remain low side. He was already given 1 L of bolus and another 500 cc in progress now. We were consulted by cardiology to help with admitting pt to the hospital. Pt denies previous h/o Afib. No CP/dyspnea. He stated he had good po intake last week. We were asked to admit for work up and evaluation of the above problems. Past Medical History:   Diagnosis Date    Arthritis     CAD (coronary artery disease)     lipids    Chronic obstructive pulmonary disease (HCC)     Chronic pain     Diabetes ([address]     Hypertension     Infectious disease         Past Surgical History:   Procedure Laterality Date    HX HIP REPLACEMENT Right 02/11/2019    Acetabular bone graft    HX TONSILLECTOMY         Social History     Tobacco Use    Smoking status: Current Every Day Smoker     Packs/day: 1.50     Years: 30.00     Pack years: 45.00    Smokeless tobacco: Never Used    Tobacco comment: 2 ppd for years/cutting back   Substance Use Topics    Alcohol use:  Yes     Alcohol/week: 2.4 oz     Types: 4 Cans of beer per week     Comment: weekends        Family History   Problem Relation Age of Onset    Heart Disease Mother     Heart Disease Father     Alzheimer Father     Heart Disease Brother      No Known Allergies     Prior to Admission medications    Medication Sig Start Date End Date Taking? Authorizing Provider   aspirin 81 mg chewable tablet Take 1 Tab by mouth two (2) times a day for 30 days. 2/12/19 3/14/19  Irina Chung NP   acetaminophen (TYLENOL) 325 mg tablet Take 2 Tabs by mouth every six (6) hours for 14 days. 2/12/19 2/26/19  Irina Chung NP   famotidine (PEPCID) 20 mg tablet Take 1 Tab by mouth two (2) times a day for 30 days. 2/12/19 3/14/19  Irina Chung NP   oxyCODONE IR (ROXICODONE) 5 mg immediate release tablet Take 1-2 Tabs by mouth every four (4) hours as needed. Max Daily Amount: 60 mg. If insurance prior auth./quantity restrictions apply, refer to and look up diagnosis code one prescription. Partial fill as needed is permitted. Please do not contact prescriber. 2/12/19   Irina Chung NP   polyethylene glycol [facility]) 17 gram packet Take 1 Packet by mouth daily as needed (constipation) for up to 15 days. 2/12/19 2/27/19  Irina Chung NP   senna-docusate (PERICOLACE) 8.6-50 mg per tablet Take 1 Tab by mouth daily. 2/12/19   Irina Chung NP   multivitamin (ONE A DAY) tablet Take 1 Tab by mouth daily. Provider, Historical   lisinopril-hydroCHLOROthiazide (PRINZIDE, ZESTORETIC) 20-12.5 mg per tablet Take 1 Tab by mouth two (2) times a day. Provider, Historical   atorvastatin (LIPITOR) 20 mg tablet Take 20 mg by mouth daily. Provider, Historical   docosahexanoic acid/epa (FISH OIL PO) Take 1,000 mg by mouth daily. Provider, Historical   gemfibrozil (LOPID) 600 mg tablet Take  by mouth two (2) times a day. Patrice Weathers MD   METFORMIN HCL (METFORMIN PO) Take 1,000 mg by mouth two (2) times a day. Patrice Weathers MD       REVIEW OF SYSTEMS:     I am not able to complete the review of systems because:    The patient is intubated and sedated    The patient has altered mental status due to his acute medical problems    The patient has baseline aphasia from prior stroke(s) The patient has baseline dementia and is not reliable historian    The patient is in acute medical distress and unable to provide information           Total of 12 systems reviewed as follows:       POSITIVE= underlined text  Negative = text not underlined  General:  fever, chills, sweats, generalized weakness, weight loss/gain,      loss of appetite   Eyes:    blurred vision, eye pain, loss of vision, double vision  ENT:    rhinorrhea, pharyngitis   Respiratory:   cough, sputum production, SOB, HERNANDEZ, wheezing, pleuritic pain   Cardiology:   chest pain, palpitations, orthopnea, PND, edema, syncope   Gastrointestinal:  abdominal pain , N/V, diarrhea, dysphagia, constipation, bleeding   Genitourinary:  frequency, urgency, dysuria, hematuria, incontinence   Muskuloskeletal :  arthralgia, myalgia, back pain  Hematology:  easy bruising, nose or gum bleeding, lymphadenopathy   Dermatological: rash, ulceration, pruritis, color change / jaundice  Endocrine:   hot flashes or polydipsia   Neurological:  headache, dizziness, confusion, focal weakness, paresthesia,     Speech difficulties, memory loss, gait difficulty  Psychological: Feelings of anxiety, depression, agitation    Objective:   VITALS:    Visit Vitals  BP 93/68 (BP 1 Location: Right arm, BP Patient Position: At rest)   Pulse (!) 114   Temp 98.1 °F (36.7 °C)   Resp 16   Ht 5' 6\" (1.676 m)   Wt 83.3 kg (183 lb 10.3 oz)   SpO2 100%   BMI 29.64 kg/m²       PHYSICAL EXAM:    General:    Alert, cooperative, no distress, appears stated age. HEENT: Atraumatic, anicteric sclerae, pink conjunctivae     No oral ulcers, mucosa moist, throat clear, dentition fair  Neck:  Supple, symmetrical,  thyroid: non tender  Lungs:   Clear to auscultation bilaterally. No Wheezing or Rhonchi. No rales. Chest wall:  No tenderness  No Accessory muscle use. Heart:   Irregularly irregular rhythm,  No  murmur   No edema  Abdomen:   Soft, non-tender. Not distended.   Bowel sounds normal  Extremities: No cyanosis. No clubbing,      Skin turgor normal, Capillary refill normal, Radial dial pulse 2+                          R LE externally rotated   Skin:     Not pale. Not Jaundiced  No rashes   Psych:  Good insight. Not depressed. Not anxious or agitated. Neurologic: EOMs intact. No facial asymmetry. No aphasia or slurred speech. Symmetrical strength, Sensation grossly intact. Alert and oriented X 4.     _______________________________________________________________________  Care Plan discussed with:    Comments   Patient y    Family      RN y    Care Manager                    Consultant:  [facility]    _______________________________________________________________________  Expected  Disposition:   Home with Family    HH/PT/OT/RN y   SNF/LTC y   [de-identified]    ________________________________________________________________________  TOTAL TIME:  [address] [place] Provided     Minutes non procedure based      Comments     Reviewed previous records   >50% of visit spent in counseling and coordination of care  Discussion with patient and/or family and questions answered       ________________________________________________________________________  Signed: Linsey Snyder MD    Procedures: see electronic medical records for all procedures/Xrays and details which were not copied into this note but were reviewed prior to creation of Plan.     LAB DATA REVIEWED:    Recent Results (from the past 24 hour(s))   POC CHEM8    Collection Time: 02/19/19  8:02 AM   Result Value Ref Range    Calcium, ionized (POC) 1.12 1.12 - 1.32 mmol/L    Sodium (POC) 137 136 - 145 mmol/L    Potassium (POC) 4.6 3.5 - 5.1 mmol/L    Chloride (POC) 103 98 - 107 mmol/L    CO2 (POC) 23 21 - 32 mmol/L    Anion gap (POC) 16 10 - 20 mmol/L    Glucose (POC) 103 (H) 65 - 100 mg/dL    BUN (POC) 54 (H) 9 - 20 mg/dL    Creatinine (POC) 1.8 (H) 0.6 - 1.3 mg/dL    GFRAA, POC 46 (L) >60 ml/min/1.73m2    GFRNA, POC 38 (L) >60 ml/min/1.73m2    Hematocrit (POC) 30 (L) 36.6 - 50.3 %    Comment Comment Not Indicated. METABOLIC PANEL, BASIC    Collection Time: 02/19/19  8:35 AM   Result Value Ref Range    Sodium 135 (L) 136 - 145 mmol/L    Potassium 4.5 3.5 - 5.1 mmol/L    Chloride 102 97 - 108 mmol/L    CO2 23 21 - 32 mmol/L    Anion gap 10 5 - 15 mmol/L    Glucose 98 65 - 100 mg/dL    BUN 57 (H) 6 - 20 MG/DL    Creatinine 1.84 (H) 0.70 - 1.30 MG/DL    BUN/Creatinine ratio 31 (H) 12 - 20      GFR est AA 45 (L) >60 ml/min/1.73m2    GFR est non-AA 37 (L) >60 ml/min/1.73m2    Calcium 9.1 8.5 - 10.1 MG/DL   MAGNESIUM    Collection Time: 02/19/19  8:35 AM   Result Value Ref Range    Magnesium 2.2 1.6 - 2.4 mg/dL   TROPONIN I    Collection Time: 02/19/19  8:35 AM   Result Value Ref Range    Troponin-I, Qt. <0.05 <0.05 ng/mL   TSH 3RD GENERATION    Collection Time: 02/19/19  8:45 AM   Result Value Ref Range    TSH 4.43 (H) 0.36 - 3.74 uIU/mL   EKG, 12 LEAD, INITIAL    Collection Time: 02/19/19  8:57 AM   Result Value Ref Range    Ventricular Rate 131 BPM    Atrial Rate 166 BPM    QRS Duration 88 ms    Q-T Interval 294 ms    QTC Calculation (Bezet) 434 ms    Calculated R Axis 25 degrees    Calculated T Axis -130 degrees    Diagnosis       Atrial fibrillation with rapid ventricular response  Nonspecific T wave abnormality , probably digitalis effect  Abnormal ECG  When compared with ECG of 04-FEB-2019 09:43,  Atrial fibrillation has replaced Sinus rhythm  Vent.  rate has increased BY  58 BPM  Nonspecific T wave abnormality now evident in Inferior leads  Nonspecific T wave abnormality now evident in Lateral leads

## 2019-02-19 NOTE — PROGRESS NOTES
Consult received. Have seen patient, full note to follow. Starting amio bolus and infusion.    Labs pending

## 2019-02-19 NOTE — PROGRESS NOTES
Fish Oil  Per patient, he has been told to stop taking the fish oil; therefore, will d/c the order    Thanks  Peace Jacques PHARMD

## 2019-02-19 NOTE — PROGRESS NOTES
[phone] - Primary Nurse Aleshia Hannon, ZION and Shira Em RN performed a dual skin assessment on this patient Impairment noted- see wound doc flow sheet  Osorio score is 18    1326 - Pt says he has not moved bowels since last Wednesday. Contacted physician, dulcolax and senna ordered. Gave pt dulcolax and senna. 36 - Pt inquiring about food. Contacted physician. Consistent Carbs diet ordered. Pt ordered food. 1600 - Pre-op called and informed that pt is on docket for surgery at 0730 tomorrow.     65 - Surgeon informed pt that surgery will take place 2/21 1857 - amio bag finished; new bag hung @ 0.5 mg/min

## 2019-02-19 NOTE — PERIOP NOTES
Patient admitted and VS showed heart rate 130's-140's and BP 89/67. Patient asymptomatic. Called Dr. Liliana Greer to make him aware. Dr. Liliana Greer in to see patient and EKG was done. Stat cardiology consult ordered. Patient states he feels \"fine\" no complaints of chest pain or shortness of breath. 9663 - cardiology consult called to [facility], Dr. Margi Colón will be consulted. [address] Hollis called and stated will be down to see patient. Hazel Izaguirre, RN updated patients daughter. Patient continues to deny feeling bad.     0825 - Rosanna Cortes NP, at bedside to see patient. Ordered Amiodarone bolus, then Amiodarone drip to start after bolus, ECHO ordered as well. Hospitalist consulted. Daughter called back to pre op stating patient took Oxycodone this morning. 0830 - spoke with Dr. Hans Dai, hospitalist, regarding patient admission to hospital. She stated Dr. Rodriguez Him or cariology to call her for consult to admit patient. OR supervisor made aware and Dr. Jennifer Manzano made aware and given to the number. 1050 - Dr. Margi Colón in to see patient    [phone] - ECHO at bedside    0958 - Dr. Margi Colón called to make aware patients blood pressure dropped to 68/55 and Amiodarone drip stopped. Order received for 500mL bolus and then to restart Amiodarone. 200 - called Dr. Jennifer Manzano and left voice mail for him to call regarding patient. 1011 - spoke with [facility] in Dr. Jennifer Manzano' office to make aware that Dr. Jennifer Manzano needs to call Dr. Hans Dai regarding admission orders to the hospital.     1 - daughter and daughter in law brought to bedside to give updates    26 - Dr. Hans Dai at bedside to see patient. Orders for admission to step down    1110 - Dr. Jennifer Manzano at bedside to see patient    200 - spoke with Rosanna Cortes NP to make aware  mL bolus completed and patients Amiodarone restarted and BP 85/59. Patient asymptomatic. Ordered NS to infuse at 100 mL/hr and call with any concerns. 1124 - spoke with patients daughter and made her aware patient was going to be admitted and that Dr. Fortino Monte had been in to see patient. [id number] - spoke with daughter to make her aware patient being transferred to room 2244. 1215 - patient transferred to room 2244 with oxygen and monitor.

## 2019-02-19 NOTE — CONSULTS
ORTHOPAEDIC CONSULT NOTE    Subjective:     Date of Consultation:  February 19, 2019      Judit Clark is a 62 y.o. male who presented today from home for periprosthetic fracture ORIF and revision of STARLA. One week s/p STARLA and had pain over the weekend with PT. Went to the office outpatient and was found to have fracture. In preop today was found to be in A Fib w/ RVR. Asymptomatic from cardiac standpoint. Only complaint now is hip pain. Was eating/drinking well over the weekend but getting fluids now. Patient Active Problem List    Diagnosis Date Noted    Atrial fibrillation ([address] 02/19/2019    Periprosthetic fracture around internal prosthetic hip joint 02/19/2019    A-fib ([facility] [address] 02/19/2019    S/P hip replacement 02/11/2019     Family History   Problem Relation Age of Onset    Heart Disease Mother     Heart Disease Father     Alzheimer Father     Heart Disease Brother       Social History     Tobacco Use    Smoking status: Current Every Day Smoker     Packs/day: 1.50     Years: 30.00     Pack years: 45.00    Smokeless tobacco: Never Used    Tobacco comment: 2 ppd for years/cutting back   Substance Use Topics    Alcohol use: Yes     Alcohol/week: 2.4 oz     Types: 4 Cans of beer per week     Comment: weekends     Past Medical History:   Diagnosis Date    Arthritis     CAD (coronary artery disease)     lipids    Chronic obstructive pulmonary disease (HCC)     Chronic pain     Diabetes ([facility] [address]     Hypertension     Infectious disease       Past Surgical History:   Procedure Laterality Date    HX HIP REPLACEMENT Right 02/11/2019    Acetabular bone graft    HX TONSILLECTOMY        Prior to Admission medications    Medication Sig Start Date End Date Taking? Authorizing Provider   aspirin 81 mg chewable tablet Take 1 Tab by mouth two (2) times a day for 30 days. 2/12/19 3/14/19  Radha Adams NP   acetaminophen (TYLENOL) 325 mg tablet Take 2 Tabs by mouth every six (6) hours for 14 days. 2/12/19 2/26/19  Sherle Dakin, NP   famotidine (PEPCID) 20 mg tablet Take 1 Tab by mouth two (2) times a day for 30 days. 2/12/19 3/14/19  Sherle Dakin, NP   oxyCODONE IR (ROXICODONE) 5 mg immediate release tablet Take 1-2 Tabs by mouth every four (4) hours as needed. Max Daily Amount: 60 mg. If insurance prior auth./quantity restrictions apply, refer to and look up diagnosis code one prescription. Partial fill as needed is permitted. Please do not contact prescriber. 2/12/19   Sherle Dakin, NP   polyethylene glycol [facility]) 17 gram packet Take 1 Packet by mouth daily as needed (constipation) for up to 15 days. 2/12/19 2/27/19  Sherle Dakin, NP   senna-docusate (PERICOLACE) 8.6-50 mg per tablet Take 1 Tab by mouth daily. 2/12/19   Sherle Dakin, NP   multivitamin (ONE A DAY) tablet Take 1 Tab by mouth daily. Provider, Historical   lisinopril-hydroCHLOROthiazide (PRINZIDE, ZESTORETIC) 20-12.5 mg per tablet Take 1 Tab by mouth two (2) times a day. Provider, Historical   atorvastatin (LIPITOR) 20 mg tablet Take 20 mg by mouth daily. Provider, Historical   docosahexanoic acid/epa (FISH OIL PO) Take 1,000 mg by mouth daily. Provider, Historical   gemfibrozil (LOPID) 600 mg tablet Take  by mouth two (2) times a day. Patrice Weathers MD   METFORMIN HCL (METFORMIN PO) Take 1,000 mg by mouth two (2) times a day.     Patrice Weathers MD     Current Facility-Administered Medications   Medication Dose Route Frequency    amiodarone (CORDARONE) 375 mg/250 mL D5W infusion  0.5 mg/min IntraVENous CONTINUOUS    insulin lispro (HUMALOG) injection   SubCUTAneous Q6H    glucose chewable tablet 16 g  4 Tab Oral PRN    dextrose (D50W) injection syrg 12.5-25 g  12.5-25 g IntraVENous PRN    glucagon (GLUCAGEN) injection 1 mg  1 mg IntraMUSCular PRN    0.9% sodium chloride infusion  100 mL/hr IntraVENous CONTINUOUS    aspirin chewable tablet 81 mg  81 mg Oral BID    [START ON 2/20/2019] atorvastatin (LIPITOR) tablet 20 mg  20 mg Oral DAILY    [START ON 2/20/2019] famotidine (PEPCID) tablet 20 mg  20 mg Oral ACB    gemfibrozil (LOPID) tablet 300 mg  300 mg Oral ACB&D    oxyCODONE IR (ROXICODONE) tablet 5 mg  5 mg Oral Q4H PRN    sodium chloride (NS) flush 5-40 mL  5-40 mL IntraVENous Q8H    sodium chloride (NS) flush 5-40 mL  5-40 mL IntraVENous PRN    acetaminophen (TYLENOL) tablet 650 mg  650 mg Oral Q4H PRN    naloxone (NARCAN) injection 0.4 mg  0.4 mg IntraVENous PRN    ondansetron (ZOFRAN) injection 4 mg  4 mg IntraVENous Q4H PRN    nicotine (NICODERM CQ) 21 mg/24 hr patch 1 Patch  1 Patch TransDERmal Q24H    enoxaparin (LOVENOX) injection 80 mg  1 mg/kg SubCUTAneous Q12H    polyethylene glycol (MIRALAX) packet 17 g  17 g Oral DAILY    senna-docusate (PERICOLACE) 8.6-50 mg per tablet 1 Tab  1 Tab Oral DAILY    bisacodyl (DULCOLAX) suppository 10 mg  10 mg Rectal DAILY PRN      No Known Allergies     Review of Systems:  A comprehensive review of systems was negative except for that written in the HPI.     Mental Status: no dementia    Objective:     Patient Vitals for the past 8 hrs:   BP Temp Pulse Resp SpO2   02/19/19 1155 (!) 76/60 -- 95 17 99 %   02/19/19 1145 90/68 -- (!) 101 15 98 %   02/19/19 1133 90/76 97.9 °F (36.6 °C) 96 16 97 %   02/19/19 1122 91/71 -- (!) 108 20 98 %   02/19/19 1111 (!) 88/62 -- (!) 116 15 98 %   02/19/19 1109 (!) 85/66 -- (!) 106 15 98 %   02/19/19 1105 (!) 80/57 -- (!) 102 -- 98 %   02/19/19 1055 (!) 86/65 -- (!) 107 15 98 %   02/19/19 1050 92/74 -- (!) 117 16 97 %   02/19/19 1046 (!) 80/69 -- (!) 114 15 95 %   02/19/19 1042 (!) 85/63 -- (!) 118 16 99 %   02/19/19 1038 (!) 89/67 -- 81 19 99 %   02/19/19 1036 92/78 -- (!) 114 16 97 %   02/19/19 1032 (!) 81/63 -- (!) 113 18 97 %   02/19/19 1030 102/61 -- (!) 106 17 97 %   02/19/19 1028 (!) 89/71 -- (!) 109 14 99 %   02/19/19 1026 93/68 -- (!) 114 16 100 %   02/19/19 1024 (!) 89/64 -- (!) 106 15 100 %   02/19/19 1022 90/72 -- 99 16 99 %   19 1018 93/65 -- 99 18 99 %   19 1014 98/69 -- 96 15 99 %   19 1013 (!) 83/55 -- (!) 111 18 97 %   19 1010 (!) 120/98 -- 96 16 99 %   19 0958 (!) 87/69 -- (!) 118 15 --   19 0947 (!) 80/62 -- (!) 113 15 --   19 0946 (!) 68/55 -- (!) 104 16 --   19 0938 96/68 -- (!) 113 12 94 %   19 0934 91/59 -- (!) 105 14 99 %   19 0912 90/74 -- (!) 128 15 98 %   19 0902 102/68 -- (!) 117 17 98 %   19 0900 93/70 -- (!) 134 14 97 %   19 0852 102/84 -- (!) 124 -- 95 %   19 0846 91/70 -- (!) 126 -- 97 %   19 0844 (!) 87/53 -- (!) 113 -- --   19 0832 (!) 84/58 -- (!) 120 16 100 %   19 0828 (!) 87/65 -- -- -- 100 %   19 0822 (!) 83/64 -- (!) 125 15 100 %   19 0821 (!) 83/57 -- (!) 116 -- 100 %   19 0820 (!) 80/60 -- (!) 131 -- 100 %   19 0816 (!) 84/64 -- (!) 133 16 96 %   19 0814 (!) 87/71 -- -- 15 99 %   19 0812 (!) 83/57 -- -- -- --   19 0810 (!) 79/62 -- -- 15 100 %   19 0808 (!) 86/51 -- -- -- --   19 0805 (!) 80/58 -- -- -- --   19 0804 93/62 -- -- -- --   19 0800 90/78 -- -- -- --   19 0755 95/75 -- -- -- --   19 0750 100/72 -- -- -- --     Temp (24hrs), Av °F (36.7 °C), Min:97.9 °F (36.6 °C), Max:98.1 °F (36.7 °C)      Gen: Well-developed,  in no acute distress   Musc: R leg shortened and externally rotated. Skin: No skin breakdown noted.  Skin warm, pink, dry  Neuro: Cranial nerves are grossly intact, no focal motor weakness, follows commands appropriately   Psych: Good insight, oriented to person, place and time, alert    Labs:   Recent Results (from the past 24 hour(s))   POC CHEM8    Collection Time: 19  8:02 AM   Result Value Ref Range    Calcium, ionized (POC) 1.12 1.12 - 1.32 mmol/L    Sodium (POC) 137 136 - 145 mmol/L    Potassium (POC) 4.6 3.5 - 5.1 mmol/L    Chloride (POC) 103 98 - 107 mmol/L    CO2 (POC) 23 21 - 32 mmol/L    Anion gap (POC) 16 10 - 20 mmol/L    Glucose (POC) 103 (H) 65 - 100 mg/dL    BUN (POC) 54 (H) 9 - 20 mg/dL    Creatinine (POC) 1.8 (H) 0.6 - 1.3 mg/dL    GFRAA, POC 46 (L) >60 ml/min/1.73m2    GFRNA, POC 38 (L) >60 ml/min/1.73m2    Hematocrit (POC) 30 (L) 36.6 - 50.3 %    Comment Comment Not Indicated. METABOLIC PANEL, BASIC    Collection Time: 02/19/19  8:35 AM   Result Value Ref Range    Sodium 135 (L) 136 - 145 mmol/L    Potassium 4.5 3.5 - 5.1 mmol/L    Chloride 102 97 - 108 mmol/L    CO2 23 21 - 32 mmol/L    Anion gap 10 5 - 15 mmol/L    Glucose 98 65 - 100 mg/dL    BUN 57 (H) 6 - 20 MG/DL    Creatinine 1.84 (H) 0.70 - 1.30 MG/DL    BUN/Creatinine ratio 31 (H) 12 - 20      GFR est AA 45 (L) >60 ml/min/1.73m2    GFR est non-AA 37 (L) >60 ml/min/1.73m2    Calcium 9.1 8.5 - 10.1 MG/DL   MAGNESIUM    Collection Time: 02/19/19  8:35 AM   Result Value Ref Range    Magnesium 2.2 1.6 - 2.4 mg/dL   TROPONIN I    Collection Time: 02/19/19  8:35 AM   Result Value Ref Range    Troponin-I, Qt. <0.05 <0.05 ng/mL   TSH 3RD GENERATION    Collection Time: 02/19/19  8:45 AM   Result Value Ref Range    TSH 4.43 (H) 0.36 - 3.74 uIU/mL   NT-PRO BNP    Collection Time: 02/19/19  8:45 AM   Result Value Ref Range    NT pro-BNP 3,745 (H) <125 PG/ML   EKG, 12 LEAD, INITIAL    Collection Time: 02/19/19  8:57 AM   Result Value Ref Range    Ventricular Rate 131 BPM    Atrial Rate 166 BPM    QRS Duration 88 ms    Q-T Interval 294 ms    QTC Calculation (Bezet) 434 ms    Calculated R Axis 25 degrees    Calculated T Axis -130 degrees    Diagnosis       Atrial fibrillation with rapid ventricular response  Nonspecific T wave abnormality , probably digitalis effect  Abnormal ECG  When compared with ECG of 04-FEB-2019 09:43,  Atrial fibrillation has replaced Sinus rhythm  Vent.  rate has increased BY  58 BPM  Nonspecific T wave abnormality now evident in Inferior leads  Nonspecific T wave abnormality now evident in Lateral leads     ECHO ADULT COMPLETE    Collection Time: 02/19/19 10:34 AM   Result Value Ref Range    Aortic Valve Systolic Peak Velocity 012.31 cm/s    AoV PG 7.0 mmHg    LVIDd 2.91 (A) 4.2 - 5.9 cm    LVPWd 1.36 (A) 0.6 - 1.0 cm    LVIDs 1.81 cm    IVSd 1.54 (A) 0.6 - 1.0 cm    LV ES Vol A4C 48.8 mL    LVOT Peak Velocity 86.20 cm/s    LVOT Peak Gradient 3.0 mmHg    RVIDd 4.32 cm    LV Ejection Fraction MOD 4C 56 %    LV Mass .1 88 - 224 g    LV Mass AL Index 85.1 49 - 115 g/m2    RVSP 42.6 mmHg    LVIDd (M-mode) 0.00 (A) cm    LVPWd (M-mode) 2.70 (A) cm    IVSd (M-mode) 1.57 (A) cm    LV ED Vol A4C 109.9 mL    Est. RA Pressure 10.0 mmHg    Triscuspid Valve Regurgitation Peak Gradient 32.6 mmHg    TR Max Velocity 285.69 cm/s    PASP 42.6 mmHg    LVED Vol Index A4C 57.0 mL/m2    LVES Vol Index A4C 25.3 mL/m2   GLUCOSE, POC    Collection Time: 02/19/19 12:18 PM   Result Value Ref Range    Glucose (POC) 111 (H) 65 - 100 mg/dL    Performed by Chiqui Larson          Impression:     Patient Active Problem List    Diagnosis Date Noted    Atrial fibrillation ([address] 02/19/2019    Periprosthetic fracture around internal prosthetic hip joint 02/19/2019    A-fib ([address] 02/19/2019    S/P hip replacement 02/11/2019     Active Problems:    Atrial fibrillation ([address] (2/19/2019)      Periprosthetic fracture around internal prosthetic hip joint (2/19/2019)      A-fib ([address] (2/19/2019)        Plan:   -  Plan for revision STARLA and ORIF periprosthetic fracture when cleared by cardiology  A Fib and ARF, NPO for possible cardioversion tomorrow AM  Lovenox  Likely to go to OR Thursday      Dr. Cinthia Jane aware and agrees with plan as above.       Roderick Yanez PA-C  [facility]

## 2019-02-19 NOTE — PERIOP NOTES
TRANSFER - OUT REPORT:    Verbal report given to ZION Morales(name) on Yoko Nye  being transferred to U 2244(unit) for routine progression of care       Report consisted of patients Situation, Background, Assessment and   Recommendations(SBAR). Information from the following report(s) SBAR, Kardex, MAR, Recent Results and Cardiac Rhythm Afib was reviewed with the receiving nurse. Lines:   Peripheral IV 02/19/19 Left Arm (Active)   Site Assessment Clean, dry, & intact 2/19/2019  8:03 AM   Phlebitis Assessment 0 2/19/2019  8:03 AM   Infiltration Assessment 0 2/19/2019  8:03 AM   Dressing Status Clean, dry, & intact 2/19/2019  8:03 AM   Hub Color/Line Status Pink; Infusing 2/19/2019  8:03 AM       Peripheral IV 02/19/19 Right Antecubital (Active)   Site Assessment Clean, dry, & intact 2/19/2019  9:32 AM   Phlebitis Assessment 0 2/19/2019  9:32 AM   Infiltration Assessment 0 2/19/2019  9:32 AM   Dressing Status Clean, dry, & intact 2/19/2019  9:32 AM   Dressing Type Tape;Transparent 2/19/2019  9:32 AM   Hub Color/Line Status Pink; Infusing 2/19/2019  9:32 AM        Opportunity for questions and clarification was provided.       Patient transported with:   Monitor  O2 @ 2 liters  Registered Nurse  [facility]

## 2019-02-20 ENCOUNTER — ANESTHESIA EVENT (OUTPATIENT)
Dept: SURGERY | Age: 64
DRG: 467 | End: 2019-02-20
Payer: COMMERCIAL

## 2019-02-20 LAB
ANION GAP SERPL CALC-SCNC: 9 MMOL/L (ref 5–15)
ATRIAL RATE: 166 BPM
BUN SERPL-MCNC: 37 MG/DL (ref 6–20)
BUN/CREAT SERPL: 27 (ref 12–20)
CALCIUM SERPL-MCNC: 7.8 MG/DL (ref 8.5–10.1)
CALCULATED R AXIS, ECG10: 25 DEGREES
CALCULATED T AXIS, ECG11: -130 DEGREES
CHLORIDE SERPL-SCNC: 106 MMOL/L (ref 97–108)
CO2 SERPL-SCNC: 22 MMOL/L (ref 21–32)
CREAT SERPL-MCNC: 1.37 MG/DL (ref 0.7–1.3)
DIAGNOSIS, 93000: NORMAL
ERYTHROCYTE [DISTWIDTH] IN BLOOD BY AUTOMATED COUNT: 15.9 % (ref 11.5–14.5)
GLUCOSE BLD STRIP.AUTO-MCNC: 119 MG/DL (ref 65–100)
GLUCOSE BLD STRIP.AUTO-MCNC: 144 MG/DL (ref 65–100)
GLUCOSE BLD STRIP.AUTO-MCNC: 97 MG/DL (ref 65–100)
GLUCOSE BLD STRIP.AUTO-MCNC: 98 MG/DL (ref 65–100)
GLUCOSE SERPL-MCNC: 79 MG/DL (ref 65–100)
HCT VFR BLD AUTO: 18.8 % (ref 36.6–50.3)
HCT VFR BLD AUTO: 25.3 % (ref 36.6–50.3)
HGB BLD-MCNC: 6.1 G/DL (ref 12.1–17)
HGB BLD-MCNC: 8.3 G/DL (ref 12.1–17)
MAGNESIUM SERPL-MCNC: 1.8 MG/DL (ref 1.6–2.4)
MCH RBC QN AUTO: 34.9 PG (ref 26–34)
MCHC RBC AUTO-ENTMCNC: 32.4 G/DL (ref 30–36.5)
MCV RBC AUTO: 107.4 FL (ref 80–99)
NRBC # BLD: 0.02 K/UL (ref 0–0.01)
NRBC BLD-RTO: 0.3 PER 100 WBC
PHOSPHATE SERPL-MCNC: 3.3 MG/DL (ref 2.6–4.7)
PLATELET # BLD AUTO: 244 K/UL (ref 150–400)
PMV BLD AUTO: 11 FL (ref 8.9–12.9)
POTASSIUM SERPL-SCNC: 5.1 MMOL/L (ref 3.5–5.1)
Q-T INTERVAL, ECG07: 294 MS
QRS DURATION, ECG06: 88 MS
QTC CALCULATION (BEZET), ECG08: 434 MS
RBC # BLD AUTO: 1.75 M/UL (ref 4.1–5.7)
SERVICE CMNT-IMP: ABNORMAL
SERVICE CMNT-IMP: ABNORMAL
SERVICE CMNT-IMP: NORMAL
SERVICE CMNT-IMP: NORMAL
SODIUM SERPL-SCNC: 137 MMOL/L (ref 136–145)
T4 FREE SERPL-MCNC: 0.7 NG/DL (ref 0.8–1.5)
VENTRICULAR RATE, ECG03: 131 BPM
WBC # BLD AUTO: 7.2 K/UL (ref 4.1–11.1)

## 2019-02-20 PROCEDURE — 83735 ASSAY OF MAGNESIUM: CPT

## 2019-02-20 PROCEDURE — 86923 COMPATIBILITY TEST ELECTRIC: CPT

## 2019-02-20 PROCEDURE — 84439 ASSAY OF FREE THYROXINE: CPT

## 2019-02-20 PROCEDURE — P9016 RBC LEUKOCYTES REDUCED: HCPCS

## 2019-02-20 PROCEDURE — 85018 HEMOGLOBIN: CPT

## 2019-02-20 PROCEDURE — 36430 TRANSFUSION BLD/BLD COMPNT: CPT

## 2019-02-20 PROCEDURE — 86900 BLOOD TYPING SEROLOGIC ABO: CPT

## 2019-02-20 PROCEDURE — 85027 COMPLETE CBC AUTOMATED: CPT

## 2019-02-20 PROCEDURE — 84100 ASSAY OF PHOSPHORUS: CPT

## 2019-02-20 PROCEDURE — 74011250637 HC RX REV CODE- 250/637: Performed by: HOSPITALIST

## 2019-02-20 PROCEDURE — 30233N1 TRANSFUSION OF NONAUTOLOGOUS RED BLOOD CELLS INTO PERIPHERAL VEIN, PERCUTANEOUS APPROACH: ICD-10-PCS | Performed by: HOSPITALIST

## 2019-02-20 PROCEDURE — 74011250636 HC RX REV CODE- 250/636: Performed by: NURSE PRACTITIONER

## 2019-02-20 PROCEDURE — 80048 BASIC METABOLIC PNL TOTAL CA: CPT

## 2019-02-20 PROCEDURE — 36415 COLL VENOUS BLD VENIPUNCTURE: CPT

## 2019-02-20 PROCEDURE — 77010033678 HC OXYGEN DAILY

## 2019-02-20 PROCEDURE — 65660000000 HC RM CCU STEPDOWN

## 2019-02-20 PROCEDURE — 74011250636 HC RX REV CODE- 250/636: Performed by: HOSPITALIST

## 2019-02-20 PROCEDURE — 82962 GLUCOSE BLOOD TEST: CPT

## 2019-02-20 RX ORDER — SODIUM CHLORIDE 9 MG/ML
250 INJECTION, SOLUTION INTRAVENOUS AS NEEDED
Status: DISCONTINUED | OUTPATIENT
Start: 2019-02-20 | End: 2019-02-21

## 2019-02-20 RX ADMIN — Medication 10 ML: at 02:14

## 2019-02-20 RX ADMIN — GEMFIBROZIL 300 MG: 600 TABLET ORAL at 10:47

## 2019-02-20 RX ADMIN — ENOXAPARIN SODIUM 80 MG: 80 INJECTION, SOLUTION INTRAVENOUS; SUBCUTANEOUS at 02:13

## 2019-02-20 RX ADMIN — STANDARDIZED SENNA CONCENTRATE AND DOCUSATE SODIUM 1 TABLET: 8.6; 5 TABLET, FILM COATED ORAL at 10:47

## 2019-02-20 RX ADMIN — ASPIRIN 81 MG 81 MG: 81 TABLET ORAL at 17:41

## 2019-02-20 RX ADMIN — SODIUM CHLORIDE 250 ML: 900 INJECTION, SOLUTION INTRAVENOUS at 06:36

## 2019-02-20 RX ADMIN — SODIUM CHLORIDE 100 ML/HR: 900 INJECTION, SOLUTION INTRAVENOUS at 18:26

## 2019-02-20 RX ADMIN — GEMFIBROZIL 300 MG: 600 TABLET ORAL at 17:40

## 2019-02-20 RX ADMIN — Medication 10 ML: at 17:42

## 2019-02-20 RX ADMIN — AMIODARONE HYDROCHLORIDE 0.5 MG/MIN: 50 INJECTION, SOLUTION INTRAVENOUS at 21:13

## 2019-02-20 RX ADMIN — AMIODARONE HYDROCHLORIDE 0.5 MG/MIN: 50 INJECTION, SOLUTION INTRAVENOUS at 09:36

## 2019-02-20 RX ADMIN — Medication 10 ML: at 21:14

## 2019-02-20 RX ADMIN — FAMOTIDINE 20 MG: 20 TABLET ORAL at 10:47

## 2019-02-20 RX ADMIN — OXYCODONE HYDROCHLORIDE 5 MG: 5 TABLET ORAL at 21:13

## 2019-02-20 RX ADMIN — OXYCODONE HYDROCHLORIDE 5 MG: 5 TABLET ORAL at 00:41

## 2019-02-20 RX ADMIN — POLYETHYLENE GLYCOL 3350 17 G: 17 POWDER, FOR SOLUTION ORAL at 10:47

## 2019-02-20 RX ADMIN — ASPIRIN 81 MG 81 MG: 81 TABLET ORAL at 10:47

## 2019-02-20 RX ADMIN — ATORVASTATIN CALCIUM 20 MG: 20 TABLET, FILM COATED ORAL at 10:47

## 2019-02-20 NOTE — PROGRESS NOTES
**Consult Information**  Member Facility: [facility] Nicole Hector MRN: [id number]  Consult ID: [id number]  Facility Time Zone: ET  Date and Time of Consult: 02/20/2019 03:37:39 AM  Requesting Clinician: Taylor Oneil RN  Patient Name: Kimo Dewey  YOB: 1955  Gender: Male    **Clinical Note**  Clinical Note: HGB 6.1, No evidence of bleeding. (No hgb comparison this admission, hgb 8.6 on 2/12 from previous visit)    will order 1 unit of PRBC transfusion. Patient is on full dose Lovenox. Check stool hemoccult.

## 2019-02-20 NOTE — PERIOP NOTES
spoke with Ilda Orantes RN, about pt's 11:30AM scheduled surgery tomorrow and to ensure that pt is NPO past midnight, CHG bath is complete and consents are signed and placed on chart.

## 2019-02-20 NOTE — PROGRESS NOTES
[address] [place] [address]  [phone]      Cardiology Progress Note      2/20/2019 1:50 PM    Admit Date: 2/19/2019    Admit Diagnosis:   Atrial fibrillation ([facility] [address] [I48.91]  Periprosthetic fracture around internal prosthetic hip joint [M97. 8XXA, Z96.649]  A-fib ([facility] [address] [I48.91]    Subjective:     Barrie Caro has no c/o SOB, CP. Remains in afib rate controlled. Overnight HGB dropped from 8.1 to 6.3, no signs of active bleed, anemia. Unable to perform MORRIS and cardioversion due inability to anticoagulate post cardioversion.      Visit Vitals  /61 (BP 1 Location: Left arm, BP Patient Position: At rest)   Pulse 84   Temp 99.3 °F (37.4 °C)   Resp 18   Ht 5' 6\" (1.676 m)   Wt 83.3 kg (183 lb 10.3 oz)   SpO2 99%   BMI 29.64 kg/m²       Current Facility-Administered Medications   Medication Dose Route Frequency    0.9% sodium chloride infusion 250 mL  250 mL IntraVENous PRN    amiodarone (CORDARONE) 375 mg/250 mL D5W infusion  0.5 mg/min IntraVENous CONTINUOUS    glucose chewable tablet 16 g  4 Tab Oral PRN    dextrose (D50W) injection syrg 12.5-25 g  12.5-25 g IntraVENous PRN    glucagon (GLUCAGEN) injection 1 mg  1 mg IntraMUSCular PRN    0.9% sodium chloride infusion  100 mL/hr IntraVENous CONTINUOUS    aspirin chewable tablet 81 mg  81 mg Oral BID    atorvastatin (LIPITOR) tablet 20 mg  20 mg Oral DAILY    famotidine (PEPCID) tablet 20 mg  20 mg Oral ACB    gemfibrozil (LOPID) tablet 300 mg  300 mg Oral ACB&D    oxyCODONE IR (ROXICODONE) tablet 5 mg  5 mg Oral Q4H PRN    sodium chloride (NS) flush 5-40 mL  5-40 mL IntraVENous Q8H    sodium chloride (NS) flush 5-40 mL  5-40 mL IntraVENous PRN    acetaminophen (TYLENOL) tablet 650 mg  650 mg Oral Q4H PRN    naloxone (NARCAN) injection 0.4 mg  0.4 mg IntraVENous PRN    ondansetron (ZOFRAN) injection 4 mg  4 mg IntraVENous Q4H PRN    nicotine (NICODERM CQ) 21 mg/24 hr patch 1 Patch  1 Patch TransDERmal Q24H    polyethylene glycol (MIRALAX) packet 17 g  17 g Oral DAILY    senna-docusate (PERICOLACE) 8.6-50 mg per tablet 1 Tab  1 Tab Oral DAILY    bisacodyl (DULCOLAX) suppository 10 mg  10 mg Rectal DAILY PRN    insulin lispro (HUMALOG) injection   SubCUTAneous AC&HS       Objective:      Physical Exam:  General Appearance:  WNWD  male in no acute distress  Chest:   Clear  Cardiovascular:  irr, irr, no murmur.   Abdomen:   Soft, non-tender, bowel sounds are active.   Extremities: no peripheral edema  Skin:  Warm and dry.     Data Review:   Recent Labs     02/20/19  1156 02/20/19 0211   WBC  --  7.2   HGB 8.3* 6.1*   HCT 25.3* 18.8*   PLT  --  244     Recent Labs     02/20/19  0211 02/19/19  0835    135*   K 5.1 4.5    102   CO2 22 23   GLU 79 98   BUN 37* 57*   CREA 1.37* 1.84*   CA 7.8* 9.1   MG 1.8 2.2   PHOS 3.3  --        Recent Labs     02/19/19  0835   TROIQ <0.05         Intake/Output Summary (Last 24 hours) at 2/20/2019 1350  Last data filed at 2/20/2019 1310  Gross per 24 hour   Intake 3368.96 ml   Output 3000 ml   Net 368.96 ml        Telemetry: afib with rate controlled   EKG:  Cxray:    Assessment:     Active Problems:    Atrial fibrillation ([address] (2/19/2019)      Periprosthetic fracture around internal prosthetic hip joint (2/19/2019)      A-fib ([address] (2/19/2019)        Plan:     Afib with RVR:  New onset, or first documented as patient cannot feel and tell that he is in afib. Continues ith low BP, unable to start AV archana blocking agents, continue IV amio bolus and infusion for rate control until after ortho finishes surgery  CHADS2 vasc score: 2, lovenox discontinued due to anemia and significant drop in HGB  Unable to perform MORRIS and cardioversion due to anemia  Echo preliminary report shows EF normal, RV dil.     Hypotension:  Remains low, has received IV fluids. Hold any antiHTN meds for now.     Anemia:  Transfusing per Hospitalist       Kimberyl Banks [facility]  Cardiology    Patient seen and examined by me with nurse practitioner Lokesh Carrero. I personally performed all components of the history, physical, and medical decision making and agree with the assessment and plan with minor modifications as noted. Atrial fibrillation is now adequately rate controlled on IV amiodarone. Echo shows normal LVEF, mild RV dilatation. Continue to support with transfusion and fluids, avoid anticoagulation due to acute anemia. Defer workup of anemia to orthopedics and the hospitalists. Once adequately transfused with stable blood pressure, he is at acceptable cardiac risk to proceed with urgent hip surgery.

## 2019-02-20 NOTE — PROGRESS NOTES
PCU SHIFT NURSING NOTE      Bedside shift change report given to Rajwinder Valdivia (oncoming nurse) by Linh Burrows (offgoing nurse). Report included the following information SBAR, Kardex, Intake/Output and Recent Results. Shift Summary:   2200- Patient resting comfortably. Amio gtt continued. New orders for NPO after MN acknowledge. Will continue to monitor. 0330- MD notified of Hgb 6.1. No evidence of bleeding. New orders received. 0430- Type and cross match sent to lab. Will continue to monitor. 1924- 1 unit PRBC transfusing. Educated patient on s/s of transfusion reaction. Patient verbalized understanding. Will continue to monitor. Admission Date 2/19/2019   Admission Diagnosis Atrial fibrillation ([facility] [address] [I48.91]  Periprosthetic fracture around internal prosthetic hip joint [M97. 8XXA, Z96.649]  A-fib ([facility] [address] [I48.91]   Consults IP CONSULT TO ORTHOPEDIC SURGERY        Consults   []PT   []OT   []Speech   []Case Management      [] Palliative      Cardiac Monitoring Order   []Yes   []No     IV drips   []Yes    Drip:                            Dose:  Drip:                            Dose:  Drip:                            Dose:   []No     GI Prophylaxis   []Yes   []No         DVT Prophylaxis   SCDs:             Kalin stockings:         [] Medication   []Contraindicated   []None      Activity Level Activity Level: Bed Rest         Purposeful Rounding every 1-2 hour? []Yes   Alegre Score  Total Score: 2   Bed Alarm (If score 3 or >)   []Yes   [] Refused (See signed refusal form in chart)   Osorio Score  Osorio Score: 18   Osorio Score (if score 14 or less)   []PMT consult   []Wound Care consult      []Specialty bed   [] Nutrition consult          Needs prior to discharge:   Home O2 required:    []Yes   []No    If yes, how much O2 required?     Other:    Last Bowel Movement:        Influenza Vaccine Received Flu Vaccine for Current Season (usually Sept-March): Yes        Pneumonia Vaccine           Diet Active Orders Diet    DIET NPO With Meds      LDAs               Peripheral IV 02/19/19 Left Arm (Active)   Site Assessment Clean, dry, & intact 2/19/2019  7:21 PM   Phlebitis Assessment 0 2/19/2019  7:21 PM   Infiltration Assessment 0 2/19/2019  7:21 PM   Dressing Status Clean, dry, & intact 2/19/2019  7:21 PM   Hub Color/Line Status Infusing 2/19/2019  7:21 PM   Action Taken Other (comment) 2/19/2019  7:21 PM   Alcohol Cap Used Yes 2/19/2019  7:21 PM       Peripheral IV 02/19/19 Right Antecubital (Active)   Site Assessment Clean, dry, & intact 2/19/2019  7:21 PM   Phlebitis Assessment 0 2/19/2019  7:21 PM   Infiltration Assessment 0 2/19/2019  7:21 PM   Dressing Status Clean, dry, & intact 2/19/2019  7:21 PM   Dressing Type Transparent 2/19/2019  7:21 PM   Hub Color/Line Status Flushed 2/19/2019  7:21 PM   Action Taken Other (comment) 2/19/2019  7:21 PM   Alcohol Cap Used Yes 2/19/2019  7:21 PM                      Urinary Catheter      Intake & Output Date 02/18/19 1900 - 02/19/19 0659(Not Admitted) 02/19/19 0700 - 02/20/19 0659   Shift 8088-0225 24 Hour Total 1294-2498 5216-1612 24 Hour Total   INTAKE   Shift Total(mL/kg)        OUTPUT   Urine   1400(1.4)  1400     Urine Voided   1400  1400   Shift Total(mL/kg)   1400(16.8)  1400(16.8)   NET   -1400  -1400   Weight (kg)   83.3 83.3 83.3         Readmission Risk Assessment Tool Score Low Risk            7       Total Score        3 Has Seen PCP in Last 6 Months (Yes=3, No=0)    4 IP Visits Last 12 Months (1-3=4, 4=9, >4=11)        Criteria that do not apply:    . Living with Significant Other. Assisted Living. LTAC. SNF. or   Rehab    Patient Length of Stay (>5 days = 3)    Pt.  Coverage (Medicare=5 , Medicaid, or Self-Pay=4)    Charlson Comorbidity Score (Age + Comorbid Conditions)       Expected Length of Stay - - -   Actual Length of Stay 0

## 2019-02-20 NOTE — PROGRESS NOTES
Hospitalist Progress Note    NAME: Vandana Jo   :  1955   MRN:  [id number]       Assessment / Plan:  Afib RVR new onset with hypotension. Now rate controlled. BP is stable at this time.  -on amiodarone GTT  -Holding home BP meds as needed  -cardio on board  -possible cardioversion    Acute on chronic anemia. No gross signs of blood loss at this time. Hgb down to 6.1 upon admission. S/p 1u pRBC  -trend H&H  -transfuse if Hgb <7 or symptomatic  -check FOBT  -iron profile tomorrow am if hgb stable     Hyponatremia / JUDAH. Likely prerenal in origin. Baseline Cr 1.1, admission 1.84. Cr trending down  -C/w Aggressive hydration with NS.  -Monitor cr/electrolytes/fluid statud closely.   -hctz/lisinopril held      R hip broken hardware   -s/p  R STARLA by Dr Kylah Michele which was broken during PT   -planned for surgery per ortho  -pain management with bowel regimen      DM type II  -holding metformin  - Watch for hypoglycemia with current NPO   -c/w SS      Tobacco use  -pt was educated on smocking cessation  -nicotine patch      Hyperlipidemia, cont statin    GERD, cont PPI       Code Status: Full code   DVT Prophylaxis: holding AC anticipating ortho surgery   GI Prophylaxis: not indicated     Subjective:     Chief Complaint / Reason for Physician Visit: AF    In AF; rate controlled at this time. Denies palpitations, dyspnea, CP. Otherwise no acute complaints. Review of Systems: 14 pt ROS unremarkable except as stated above. Objective:     VITALS:   Last 24hrs VS reviewed since prior progress note.  Most recent are:  Patient Vitals for the past 24 hrs:   Temp Pulse Resp BP SpO2   19 1149 99.3 °F (37.4 °C) 84 -- 113/61 99 %   19 0900 -- 79 -- 107/60 100 %   19 0801 -- 77 -- -- 100 %   19 0800 -- 85 -- 106/69 --   19 0730 98.1 °F (36.7 °C) 90 18 108/54 97 %   19 0700 -- 80 -- 96/70 96 %   19 0634 98.2 °F (36.8 °C) 80 18 (!) 88/47 99 %   19 0618 98.1 °F (36.7 °C) 83 18 (!) 89/62 97 %   02/20/19 0430 98.6 °F (37 °C) 85 18 92/49 99 %   02/20/19 0042 99 °F (37.2 °C) 66 18 102/52 99 %   02/19/19 2300 98 °F (36.7 °C) 78 18 97/43 100 %   02/19/19 2000 -- 75 -- (!) 89/61 --   02/19/19 1921 98 °F (36.7 °C) 84 20 92/50 100 %   02/19/19 1833 98.1 °F (36.7 °C) 90 18 94/56 99 %       Intake/Output Summary (Last 24 hours) at 2/20/2019 1300  Last data filed at 2/20/2019 5928  Gross per 24 hour   Intake 2987.63 ml   Output 2600 ml   Net 387.63 ml        PHYSICAL EXAM:  General:    Alert, cooperative, no distress, appears stated age. HEENT: Atraumatic, anicteric sclerae  Neck:  Supple, symmetrical  Lungs:   Clear to auscultation bilaterally. No Wheezing or Rhonchi. No rales. No Accessory muscle use. Heart:   irreg irreg,  Normal S1 and S2   No edema  Abdomen:   Soft, non-tender. Not distended. Bowel sounds normal.  No rebound  Extremities: No cyanosis. No clubbing  Skin:     Warm, dry   Neurologic: No gross focal neuro deficit      Reviewed most current lab test results and cultures  YES  Reviewed most current radiology test results   YES  Review and summation of old records today    NO  Reviewed patient's current orders and MAR    YES  PMH/SH reviewed - no change compared to H&P    ________________________________________________________________________  Carine Ayoub MD     Procedures: see electronic medical records for all procedures/Xrays and details which were not copied into this note but were reviewed prior to creation of Plan. LABS:  I reviewed today's most current labs and imaging studies.   Pertinent labs include:  Recent Labs     02/20/19  1156 02/20/19  0211   WBC  --  7.2   HGB 8.3* 6.1*   HCT 25.3* 18.8*   PLT  --  244     Recent Labs     02/20/19 0211 02/19/19  0835    135*   K 5.1 4.5    102   CO2 22 23   GLU 79 98   BUN 37* 57*   CREA 1.37* 1.84*   CA 7.8* 9.1   MG 1.8 2.2   PHOS 3.3  --        Signed: Carine Ayoub MD

## 2019-02-20 NOTE — PROGRESS NOTES
PCU SHIFT NURSING NOTE      Bedside shift change report given to [facility] (oncoming nurse) by Shaun Montoya (offgoing nurse). Report included the following information SBAR, Kardex, Procedure Summary and Intake/Output. Shift Summary:     1030 - PRBC complete. Discussed with primary. Will redraw HBG in 2 hours. 1250 - Hgb came back at 8.3. MD asked to be called if hbg less than 7.0. Pt still NPO for possible MORRIS and Cardioversion. 1400 - Pt coughed up very large amount of bloody sputum. Will notify attending. [address] [facility] with [place] [facility]. NP notified nurse that procedure was canceled today and resumed pt's diet. 1817 - Pt appears to have converted to a NRS on tele. Dr. Esau Menezes at [facility] for preop eval.    8468 - Dr. Shira Lopez returned call regaurding large clot pt coughed up. MD states to continue to monitor and recheck Hgb in AM.     Admission Date 2/19/2019   Admission  - PRBC completeDiagnosis Atrial fibrillation ([facility] [address] [I48.91]  Periprosthetic fracture around internal prosthetic hip joint [M97. 8XXA, Z96.649]  A-fib ([address] [I48.91]   Consults IP CONSULT TO ORTHOPEDIC SURGERY         Consults   []PT   []OT   []Speech   []Case Management      [] Palliative      Cardiac Monitoring Order   []Yes   []No     IV drips   []Yes    Drip:                            Dose:  Drip:                            Dose:  Drip:                            Dose:   []No     GI Prophylaxis   []Yes   []No         DVT Prophylaxis   SCDs:             Kalin stockings:         [] Medication   []Contraindicated   []None      Activity Level Activity Level: Bed Rest         Purposeful Rounding every 1-2 hour?    []Yes   Alegre Score  Total Score: 2   Bed Alarm (If score 3 or >)   []Yes   [] Refused (See signed refusal form in chart)   Osorio Score  Osorio Score: 18   Osorio Score (if score 14 or less)   []PMT consult   []Wound Care consult      []Specialty bed   [] Nutrition consult          Needs prior to discharge:   Home O2 required:    []Yes   []No    If yes, how much O2 required? Other:    Last Bowel Movement:        Influenza Vaccine Received Flu Vaccine for Current Season (usually Sept-March): Yes        Pneumonia Vaccine           Diet Active Orders   Diet    DIET NPO With Meds      LDAs               Peripheral IV 02/19/19 Right Antecubital (Active)   Site Assessment Clean, dry, & intact 2/20/2019  4:30 AM   Phlebitis Assessment 0 2/20/2019  4:30 AM   Infiltration Assessment 0 2/20/2019  4:30 AM   Dressing Status Clean, dry, & intact 2/20/2019  4:30 AM   Dressing Type Transparent 2/20/2019  4:30 AM   Hub Color/Line Status Flushed 2/20/2019  4:30 AM   Action Taken Other (comment) 2/20/2019  4:30 AM   Alcohol Cap Used Yes 2/20/2019  4:30 AM       Peripheral IV 02/20/19 Distal;Posterior;Right Forearm (Active)   Site Assessment Clean, dry, & intact 2/20/2019  4:30 AM   Phlebitis Assessment 0 2/20/2019  4:30 AM   Infiltration Assessment 0 2/20/2019  4:30 AM   Dressing Status Clean, dry, & intact 2/20/2019  4:30 AM   Dressing Type Transparent 2/20/2019  4:30 AM   Hub Color/Line Status Flushed 2/20/2019  4:30 AM   Action Taken Other (comment) 2/20/2019  4:30 AM   Alcohol Cap Used Yes 2/20/2019  4:30 AM                      Urinary Catheter      Intake & Output Date 02/19/19 0700 - 02/20/19 0659 02/20/19 0700 - 02/21/19 0659   Shift 3099-9785 6940-8078 24 Hour Total 3444-7709 6934-5510 24 Hour Total   INTAKE   P.O.  0 0        P. O.  0 0      I. V.(mL/kg/hr)  9478.1(1.9) 2511.3(1.3)        Amiodarone Volume  681.3 681. 3        Volume (0.9% sodium chloride infusion)  1830 1830      Blood    476.3  476.3     Volume (TRANSFUSE PACKED RBC'S)    476.3  476.3   Shift Total(mL/kg)  2511. 3(30.1) 2511. 3(30.1) 476.3(5.7)  476.3(5.7)   OUTPUT   Urine(mL/kg/hr) 1400(1.4) 1200(1.2) 2600(1.3) 700  700     Urine Voided 1400 1200 2600 700  700     Urine Occurrence(s)  1 x 1 x      Shift Total(mL/kg) 1400(16.8) 1200(14.4) 2600(31.2) 700(8.4)  700(8.4) NET -1400 1311.3 -88.7 -223.7  -223.7   Weight (kg) 83.3 83.3 83.3 83.3 83.3 83.3         Readmission Risk Assessment Tool Score Low Risk            7       Total Score        3 Has Seen PCP in Last 6 Months (Yes=3, No=0)    4 IP Visits Last 12 Months (1-3=4, 4=9, >4=11)        Criteria that do not apply:    . Living with Significant Other. Assisted Living. LTAC. SNF. or   Rehab    Patient Length of Stay (>5 days = 3)    Pt.  Coverage (Medicare=5 , Medicaid, or Self-Pay=4)    Charlson Comorbidity Score (Age + Comorbid Conditions)       Expected Length of Stay 3d 14h   Actual Length of Stay 1

## 2019-02-20 NOTE — PROGRESS NOTES
Problem: Afib Pathway: Day 1  Goal: Discharge Planning  Outcome: Progressing Towards Goal  NPO after MN

## 2019-02-20 NOTE — PROGRESS NOTES
Spiritual Care Partner Volunteer visited patient in [address] on February 20, 2019. Documented by:  LIZZETTE Pisano  Paging Service 416[phone])

## 2019-02-21 ENCOUNTER — ANESTHESIA (OUTPATIENT)
Dept: SURGERY | Age: 64
DRG: 467 | End: 2019-02-21
Payer: COMMERCIAL

## 2019-02-21 LAB
ERYTHROCYTE [DISTWIDTH] IN BLOOD BY AUTOMATED COUNT: 18.6 % (ref 11.5–14.5)
GLUCOSE BLD STRIP.AUTO-MCNC: 104 MG/DL (ref 65–100)
GLUCOSE BLD STRIP.AUTO-MCNC: 109 MG/DL (ref 65–100)
GLUCOSE BLD STRIP.AUTO-MCNC: 120 MG/DL (ref 65–100)
HCT VFR BLD AUTO: 23.4 % (ref 36.6–50.3)
HGB BLD-MCNC: 7.8 G/DL (ref 12.1–17)
IRON SATN MFR SERPL: 20 % (ref 20–50)
IRON SERPL-MCNC: 55 UG/DL (ref 35–150)
MCH RBC QN AUTO: 33.8 PG (ref 26–34)
MCHC RBC AUTO-ENTMCNC: 33.3 G/DL (ref 30–36.5)
MCV RBC AUTO: 101.3 FL (ref 80–99)
NRBC # BLD: 0 K/UL (ref 0–0.01)
NRBC BLD-RTO: 0 PER 100 WBC
PLATELET # BLD AUTO: 238 K/UL (ref 150–400)
PMV BLD AUTO: 10.9 FL (ref 8.9–12.9)
RBC # BLD AUTO: 2.31 M/UL (ref 4.1–5.7)
RETICS # AUTO: 0.09 M/UL (ref 0.03–0.1)
RETICS/RBC NFR AUTO: 4 % (ref 0.7–2.1)
SERVICE CMNT-IMP: ABNORMAL
TIBC SERPL-MCNC: 276 UG/DL (ref 250–450)
WBC # BLD AUTO: 6.1 K/UL (ref 4.1–11.1)

## 2019-02-21 PROCEDURE — 74011000250 HC RX REV CODE- 250

## 2019-02-21 PROCEDURE — 74011250636 HC RX REV CODE- 250/636: Performed by: NURSE PRACTITIONER

## 2019-02-21 PROCEDURE — 77030033138 HC SUT PGA STRATFX J&J -B: Performed by: ORTHOPAEDIC SURGERY

## 2019-02-21 PROCEDURE — 65660000000 HC RM CCU STEPDOWN

## 2019-02-21 PROCEDURE — 74011250637 HC RX REV CODE- 250/637: Performed by: NURSE PRACTITIONER

## 2019-02-21 PROCEDURE — 77030008684 HC TU ET CUF COVD -B: Performed by: NURSE ANESTHETIST, CERTIFIED REGISTERED

## 2019-02-21 PROCEDURE — 74011250636 HC RX REV CODE- 250/636

## 2019-02-21 PROCEDURE — 82962 GLUCOSE BLOOD TEST: CPT

## 2019-02-21 PROCEDURE — 74011250636 HC RX REV CODE- 250/636: Performed by: PHYSICIAN ASSISTANT

## 2019-02-21 PROCEDURE — 77030039267 HC ADH SKN EXOFIN S2SG -B: Performed by: ORTHOPAEDIC SURGERY

## 2019-02-21 PROCEDURE — 36415 COLL VENOUS BLD VENIPUNCTURE: CPT

## 2019-02-21 PROCEDURE — 77030032490 HC SLV COMPR SCD KNE COVD -B: Performed by: ORTHOPAEDIC SURGERY

## 2019-02-21 PROCEDURE — 74011250636 HC RX REV CODE- 250/636: Performed by: ANESTHESIOLOGY

## 2019-02-21 PROCEDURE — 77030020788: Performed by: ORTHOPAEDIC SURGERY

## 2019-02-21 PROCEDURE — 77030031139 HC SUT VCRL2 J&J -A: Performed by: ORTHOPAEDIC SURGERY

## 2019-02-21 PROCEDURE — 0SPR0JZ REMOVAL OF SYNTHETIC SUBSTITUTE FROM RIGHT HIP JOINT, FEMORAL SURFACE, OPEN APPROACH: ICD-10-PCS | Performed by: ORTHOPAEDIC SURGERY

## 2019-02-21 PROCEDURE — 77010033678 HC OXYGEN DAILY

## 2019-02-21 PROCEDURE — 74011000250 HC RX REV CODE- 250: Performed by: ORTHOPAEDIC SURGERY

## 2019-02-21 PROCEDURE — 77030003666 HC NDL SPINAL BD -A: Performed by: ORTHOPAEDIC SURGERY

## 2019-02-21 PROCEDURE — 77030019908 HC STETH ESOPH SIMS -A: Performed by: NURSE ANESTHETIST, CERTIFIED REGISTERED

## 2019-02-21 PROCEDURE — 0QS804Z REPOSITION RIGHT FEMORAL SHAFT WITH INTERNAL FIXATION DEVICE, OPEN APPROACH: ICD-10-PCS | Performed by: ORTHOPAEDIC SURGERY

## 2019-02-21 PROCEDURE — 77030013079 HC BLNKT BAIR HGGR 3M -A: Performed by: NURSE ANESTHETIST, CERTIFIED REGISTERED

## 2019-02-21 PROCEDURE — 77030014647 HC SEAL FBRN TISSL BAXT -D: Performed by: ORTHOPAEDIC SURGERY

## 2019-02-21 PROCEDURE — 77030018846 HC SOL IRR STRL H20 ICUM -A: Performed by: ORTHOPAEDIC SURGERY

## 2019-02-21 PROCEDURE — C1776 JOINT DEVICE (IMPLANTABLE): HCPCS | Performed by: ORTHOPAEDIC SURGERY

## 2019-02-21 PROCEDURE — 77030011640 HC PAD GRND REM COVD -A: Performed by: ORTHOPAEDIC SURGERY

## 2019-02-21 PROCEDURE — 74011250636 HC RX REV CODE- 250/636: Performed by: ORTHOPAEDIC SURGERY

## 2019-02-21 PROCEDURE — 77030018836 HC SOL IRR NACL ICUM -A: Performed by: ORTHOPAEDIC SURGERY

## 2019-02-21 PROCEDURE — 77030018723 HC ELCTRD BLD COVD -A: Performed by: ORTHOPAEDIC SURGERY

## 2019-02-21 PROCEDURE — 77030008467 HC STPLR SKN COVD -B: Performed by: ORTHOPAEDIC SURGERY

## 2019-02-21 PROCEDURE — 74011250637 HC RX REV CODE- 250/637: Performed by: HOSPITALIST

## 2019-02-21 PROCEDURE — 0SRR03A REPLACEMENT OF RIGHT HIP JOINT, FEMORAL SURFACE WITH CERAMIC SYNTHETIC SUBSTITUTE, UNCEMENTED, OPEN APPROACH: ICD-10-PCS | Performed by: ORTHOPAEDIC SURGERY

## 2019-02-21 PROCEDURE — 76010000162 HC OR TIME 1.5 TO 2 HR INTENSV-TIER 1: Performed by: ORTHOPAEDIC SURGERY

## 2019-02-21 PROCEDURE — 77030035643 HC BLD SAW OSC PRECIS STRY -C: Performed by: ORTHOPAEDIC SURGERY

## 2019-02-21 PROCEDURE — 74011250637 HC RX REV CODE- 250/637: Performed by: PHYSICIAN ASSISTANT

## 2019-02-21 PROCEDURE — 77030027138 HC INCENT SPIROMETER -A

## 2019-02-21 PROCEDURE — 85045 AUTOMATED RETICULOCYTE COUNT: CPT

## 2019-02-21 PROCEDURE — P9045 ALBUMIN (HUMAN), 5%, 250 ML: HCPCS

## 2019-02-21 PROCEDURE — 76210000016 HC OR PH I REC 1 TO 1.5 HR: Performed by: ORTHOPAEDIC SURGERY

## 2019-02-21 PROCEDURE — 83540 ASSAY OF IRON: CPT

## 2019-02-21 PROCEDURE — 76060000034 HC ANESTHESIA 1.5 TO 2 HR: Performed by: ORTHOPAEDIC SURGERY

## 2019-02-21 PROCEDURE — 77030022704 HC SUT VLOC COVD -B: Performed by: ORTHOPAEDIC SURGERY

## 2019-02-21 PROCEDURE — C1713 ANCHOR/SCREW BN/BN,TIS/BN: HCPCS | Performed by: ORTHOPAEDIC SURGERY

## 2019-02-21 PROCEDURE — 85027 COMPLETE CBC AUTOMATED: CPT

## 2019-02-21 PROCEDURE — P9016 RBC LEUKOCYTES REDUCED: HCPCS

## 2019-02-21 PROCEDURE — 77030035236 HC SUT PDS STRATFX BARB J&J -B: Performed by: ORTHOPAEDIC SURGERY

## 2019-02-21 DEVICE — CABLE SURG DIA1.7MM S STL HA CERCLAGE W/ CRMP 29880101S] DEPUY SYNTHES USA]: Type: IMPLANTABLE DEVICE | Site: HIP | Status: FUNCTIONAL

## 2019-02-21 DEVICE — IMPLANTABLE DEVICE
Type: IMPLANTABLE DEVICE | Site: HIP | Status: FUNCTIONAL
Brand: EXACTECH

## 2019-02-21 DEVICE — IMPLANTABLE DEVICE
Type: IMPLANTABLE DEVICE | Site: HIP | Status: FUNCTIONAL
Brand: ALTEON

## 2019-02-21 RX ORDER — DIPHENHYDRAMINE HYDROCHLORIDE 50 MG/ML
12.5 INJECTION, SOLUTION INTRAMUSCULAR; INTRAVENOUS
Status: DISCONTINUED | OUTPATIENT
Start: 2019-02-21 | End: 2019-02-21 | Stop reason: HOSPADM

## 2019-02-21 RX ORDER — SODIUM CHLORIDE 0.9 % (FLUSH) 0.9 %
5-40 SYRINGE (ML) INJECTION EVERY 8 HOURS
Status: DISCONTINUED | OUTPATIENT
Start: 2019-02-21 | End: 2019-02-21 | Stop reason: HOSPADM

## 2019-02-21 RX ORDER — SODIUM CHLORIDE, SODIUM LACTATE, POTASSIUM CHLORIDE, CALCIUM CHLORIDE 600; 310; 30; 20 MG/100ML; MG/100ML; MG/100ML; MG/100ML
25 INJECTION, SOLUTION INTRAVENOUS CONTINUOUS
Status: DISCONTINUED | OUTPATIENT
Start: 2019-02-21 | End: 2019-02-21 | Stop reason: HOSPADM

## 2019-02-21 RX ORDER — ALBUMIN HUMAN 50 G/1000ML
SOLUTION INTRAVENOUS AS NEEDED
Status: DISCONTINUED | OUTPATIENT
Start: 2019-02-21 | End: 2019-02-21 | Stop reason: HOSPADM

## 2019-02-21 RX ORDER — GLYCOPYRROLATE 0.2 MG/ML
INJECTION INTRAMUSCULAR; INTRAVENOUS AS NEEDED
Status: DISCONTINUED | OUTPATIENT
Start: 2019-02-21 | End: 2019-02-21 | Stop reason: HOSPADM

## 2019-02-21 RX ORDER — MORPHINE SULFATE 2 MG/ML
1 INJECTION, SOLUTION INTRAMUSCULAR; INTRAVENOUS
Status: DISCONTINUED | OUTPATIENT
Start: 2019-02-21 | End: 2019-02-21

## 2019-02-21 RX ORDER — SODIUM CHLORIDE 0.9 % (FLUSH) 0.9 %
5-40 SYRINGE (ML) INJECTION AS NEEDED
Status: DISCONTINUED | OUTPATIENT
Start: 2019-02-21 | End: 2019-02-25 | Stop reason: HOSPADM

## 2019-02-21 RX ORDER — SODIUM CHLORIDE 9 MG/ML
INJECTION, SOLUTION INTRAVENOUS
Status: DISCONTINUED | OUTPATIENT
Start: 2019-02-21 | End: 2019-02-21 | Stop reason: HOSPADM

## 2019-02-21 RX ORDER — OXYCODONE HYDROCHLORIDE 5 MG/1
5 TABLET ORAL
Status: DISCONTINUED | OUTPATIENT
Start: 2019-02-21 | End: 2019-02-25 | Stop reason: HOSPADM

## 2019-02-21 RX ORDER — CEFAZOLIN SODIUM 1 G/3ML
INJECTION, POWDER, FOR SOLUTION INTRAMUSCULAR; INTRAVENOUS AS NEEDED
Status: DISCONTINUED | OUTPATIENT
Start: 2019-02-21 | End: 2019-02-21 | Stop reason: HOSPADM

## 2019-02-21 RX ORDER — DEXAMETHASONE SODIUM PHOSPHATE 4 MG/ML
10 INJECTION, SOLUTION INTRA-ARTICULAR; INTRALESIONAL; INTRAMUSCULAR; INTRAVENOUS; SOFT TISSUE DAILY
Status: COMPLETED | OUTPATIENT
Start: 2019-02-22 | End: 2019-02-22

## 2019-02-21 RX ORDER — LIDOCAINE HYDROCHLORIDE 10 MG/ML
0.1 INJECTION, SOLUTION EPIDURAL; INFILTRATION; INTRACAUDAL; PERINEURAL AS NEEDED
Status: DISCONTINUED | OUTPATIENT
Start: 2019-02-21 | End: 2019-02-21

## 2019-02-21 RX ORDER — ONDANSETRON 2 MG/ML
INJECTION INTRAMUSCULAR; INTRAVENOUS AS NEEDED
Status: DISCONTINUED | OUTPATIENT
Start: 2019-02-21 | End: 2019-02-21 | Stop reason: HOSPADM

## 2019-02-21 RX ORDER — POLYETHYLENE GLYCOL 3350 17 G/17G
17 POWDER, FOR SOLUTION ORAL DAILY
Status: DISCONTINUED | OUTPATIENT
Start: 2019-02-22 | End: 2019-02-21 | Stop reason: SDUPTHER

## 2019-02-21 RX ORDER — FENTANYL CITRATE 50 UG/ML
INJECTION, SOLUTION INTRAMUSCULAR; INTRAVENOUS AS NEEDED
Status: DISCONTINUED | OUTPATIENT
Start: 2019-02-21 | End: 2019-02-21 | Stop reason: HOSPADM

## 2019-02-21 RX ORDER — ACETAMINOPHEN 325 MG/1
650 TABLET ORAL EVERY 6 HOURS
Status: DISCONTINUED | OUTPATIENT
Start: 2019-02-21 | End: 2019-02-25 | Stop reason: HOSPADM

## 2019-02-21 RX ORDER — VANCOMYCIN HYDROCHLORIDE 1 G/20ML
INJECTION, POWDER, LYOPHILIZED, FOR SOLUTION INTRAVENOUS AS NEEDED
Status: DISCONTINUED | OUTPATIENT
Start: 2019-02-21 | End: 2019-02-21 | Stop reason: HOSPADM

## 2019-02-21 RX ORDER — SODIUM CHLORIDE 0.9 % (FLUSH) 0.9 %
5-40 SYRINGE (ML) INJECTION EVERY 8 HOURS
Status: DISCONTINUED | OUTPATIENT
Start: 2019-02-21 | End: 2019-02-25 | Stop reason: HOSPADM

## 2019-02-21 RX ORDER — OXYCODONE HYDROCHLORIDE 5 MG/1
10 TABLET ORAL
Status: DISCONTINUED | OUTPATIENT
Start: 2019-02-21 | End: 2019-02-25 | Stop reason: HOSPADM

## 2019-02-21 RX ORDER — SODIUM CHLORIDE 9 MG/ML
250 INJECTION, SOLUTION INTRAVENOUS AS NEEDED
Status: DISCONTINUED | OUTPATIENT
Start: 2019-02-21 | End: 2019-02-25 | Stop reason: HOSPADM

## 2019-02-21 RX ORDER — ONDANSETRON 4 MG/1
4 TABLET, ORALLY DISINTEGRATING ORAL
Status: DISCONTINUED | OUTPATIENT
Start: 2019-02-21 | End: 2019-02-25 | Stop reason: HOSPADM

## 2019-02-21 RX ORDER — MORPHINE SULFATE 2 MG/ML
1 INJECTION, SOLUTION INTRAMUSCULAR; INTRAVENOUS
Status: DISCONTINUED | OUTPATIENT
Start: 2019-02-21 | End: 2019-02-25 | Stop reason: HOSPADM

## 2019-02-21 RX ORDER — MIDAZOLAM HYDROCHLORIDE 1 MG/ML
INJECTION, SOLUTION INTRAMUSCULAR; INTRAVENOUS AS NEEDED
Status: DISCONTINUED | OUTPATIENT
Start: 2019-02-21 | End: 2019-02-21 | Stop reason: HOSPADM

## 2019-02-21 RX ORDER — FERROUS SULFATE, DRIED 160(50) MG
1 TABLET, EXTENDED RELEASE ORAL
Status: DISCONTINUED | OUTPATIENT
Start: 2019-02-22 | End: 2019-02-21 | Stop reason: SDUPTHER

## 2019-02-21 RX ORDER — ROCURONIUM BROMIDE 10 MG/ML
INJECTION, SOLUTION INTRAVENOUS AS NEEDED
Status: DISCONTINUED | OUTPATIENT
Start: 2019-02-21 | End: 2019-02-21 | Stop reason: HOSPADM

## 2019-02-21 RX ORDER — CEFAZOLIN SODIUM/WATER 2 G/20 ML
2 SYRINGE (ML) INTRAVENOUS
Status: DISCONTINUED | OUTPATIENT
Start: 2019-02-21 | End: 2019-02-21 | Stop reason: HOSPADM

## 2019-02-21 RX ORDER — POLYETHYLENE GLYCOL 3350 17 G/17G
17 POWDER, FOR SOLUTION ORAL DAILY
Status: DISCONTINUED | OUTPATIENT
Start: 2019-02-22 | End: 2019-02-25 | Stop reason: HOSPADM

## 2019-02-21 RX ORDER — ADHESIVE BANDAGE
30 BANDAGE TOPICAL DAILY PRN
Status: DISCONTINUED | OUTPATIENT
Start: 2019-02-22 | End: 2019-02-25 | Stop reason: HOSPADM

## 2019-02-21 RX ORDER — NALOXONE HYDROCHLORIDE 0.4 MG/ML
0.4 INJECTION, SOLUTION INTRAMUSCULAR; INTRAVENOUS; SUBCUTANEOUS AS NEEDED
Status: DISCONTINUED | OUTPATIENT
Start: 2019-02-21 | End: 2019-02-25 | Stop reason: HOSPADM

## 2019-02-21 RX ORDER — NEOSTIGMINE METHYLSULFATE 1 MG/ML
INJECTION INTRAVENOUS AS NEEDED
Status: DISCONTINUED | OUTPATIENT
Start: 2019-02-21 | End: 2019-02-21 | Stop reason: HOSPADM

## 2019-02-21 RX ORDER — SUCCINYLCHOLINE CHLORIDE 20 MG/ML
INJECTION INTRAMUSCULAR; INTRAVENOUS AS NEEDED
Status: DISCONTINUED | OUTPATIENT
Start: 2019-02-21 | End: 2019-02-21 | Stop reason: HOSPADM

## 2019-02-21 RX ORDER — SODIUM CHLORIDE 9 MG/ML
125 INJECTION, SOLUTION INTRAVENOUS CONTINUOUS
Status: DISPENSED | OUTPATIENT
Start: 2019-02-21 | End: 2019-02-22

## 2019-02-21 RX ORDER — CEFAZOLIN SODIUM/WATER 2 G/20 ML
2 SYRINGE (ML) INTRAVENOUS EVERY 8 HOURS
Status: COMPLETED | OUTPATIENT
Start: 2019-02-21 | End: 2019-02-22

## 2019-02-21 RX ORDER — SODIUM CHLORIDE 0.9 % (FLUSH) 0.9 %
5-40 SYRINGE (ML) INJECTION AS NEEDED
Status: DISCONTINUED | OUTPATIENT
Start: 2019-02-21 | End: 2019-02-21 | Stop reason: HOSPADM

## 2019-02-21 RX ORDER — ONDANSETRON 2 MG/ML
4 INJECTION INTRAMUSCULAR; INTRAVENOUS AS NEEDED
Status: DISCONTINUED | OUTPATIENT
Start: 2019-02-21 | End: 2019-02-21 | Stop reason: HOSPADM

## 2019-02-21 RX ORDER — PROPOFOL 10 MG/ML
INJECTION, EMULSION INTRAVENOUS AS NEEDED
Status: DISCONTINUED | OUTPATIENT
Start: 2019-02-21 | End: 2019-02-21 | Stop reason: HOSPADM

## 2019-02-21 RX ORDER — OXYCODONE HYDROCHLORIDE 5 MG/1
5 TABLET ORAL
Status: DISCONTINUED | OUTPATIENT
Start: 2019-02-21 | End: 2019-02-21

## 2019-02-21 RX ORDER — OXYCODONE HYDROCHLORIDE 5 MG/1
10 TABLET ORAL
Status: DISCONTINUED | OUTPATIENT
Start: 2019-02-21 | End: 2019-02-21

## 2019-02-21 RX ORDER — HYDROMORPHONE HYDROCHLORIDE 2 MG/ML
INJECTION, SOLUTION INTRAMUSCULAR; INTRAVENOUS; SUBCUTANEOUS AS NEEDED
Status: DISCONTINUED | OUTPATIENT
Start: 2019-02-21 | End: 2019-02-21 | Stop reason: HOSPADM

## 2019-02-21 RX ORDER — ONDANSETRON 2 MG/ML
4 INJECTION INTRAMUSCULAR; INTRAVENOUS
Status: DISCONTINUED | OUTPATIENT
Start: 2019-02-21 | End: 2019-02-21 | Stop reason: SDUPTHER

## 2019-02-21 RX ORDER — AMOXICILLIN 250 MG
1 CAPSULE ORAL 2 TIMES DAILY
Status: DISCONTINUED | OUTPATIENT
Start: 2019-02-21 | End: 2019-02-25 | Stop reason: HOSPADM

## 2019-02-21 RX ORDER — FACIAL-BODY WIPES
10 EACH TOPICAL DAILY PRN
Status: DISCONTINUED | OUTPATIENT
Start: 2019-02-23 | End: 2019-02-25 | Stop reason: HOSPADM

## 2019-02-21 RX ORDER — PHENYLEPHRINE HCL IN 0.9% NACL 0.4MG/10ML
SYRINGE (ML) INTRAVENOUS AS NEEDED
Status: DISCONTINUED | OUTPATIENT
Start: 2019-02-21 | End: 2019-02-21 | Stop reason: HOSPADM

## 2019-02-21 RX ORDER — MORPHINE SULFATE 10 MG/ML
2 INJECTION, SOLUTION INTRAMUSCULAR; INTRAVENOUS
Status: DISCONTINUED | OUTPATIENT
Start: 2019-02-21 | End: 2019-02-21 | Stop reason: HOSPADM

## 2019-02-21 RX ORDER — LIDOCAINE HYDROCHLORIDE 10 MG/ML
0.1 INJECTION, SOLUTION EPIDURAL; INFILTRATION; INTRACAUDAL; PERINEURAL AS NEEDED
Status: DISCONTINUED | OUTPATIENT
Start: 2019-02-21 | End: 2019-02-21 | Stop reason: HOSPADM

## 2019-02-21 RX ORDER — IPRATROPIUM BROMIDE AND ALBUTEROL SULFATE 2.5; .5 MG/3ML; MG/3ML
3 SOLUTION RESPIRATORY (INHALATION)
Status: COMPLETED | OUTPATIENT
Start: 2019-02-21 | End: 2019-02-21

## 2019-02-21 RX ORDER — LIDOCAINE HYDROCHLORIDE 20 MG/ML
INJECTION, SOLUTION EPIDURAL; INFILTRATION; INTRACAUDAL; PERINEURAL AS NEEDED
Status: DISCONTINUED | OUTPATIENT
Start: 2019-02-21 | End: 2019-02-21 | Stop reason: HOSPADM

## 2019-02-21 RX ORDER — FENTANYL CITRATE 50 UG/ML
25 INJECTION, SOLUTION INTRAMUSCULAR; INTRAVENOUS
Status: DISCONTINUED | OUTPATIENT
Start: 2019-02-21 | End: 2019-02-21 | Stop reason: HOSPADM

## 2019-02-21 RX ADMIN — FENTANYL CITRATE 25 MCG: 50 INJECTION, SOLUTION INTRAMUSCULAR; INTRAVENOUS at 13:33

## 2019-02-21 RX ADMIN — HYDROMORPHONE HYDROCHLORIDE 0.4 MG: 2 INJECTION, SOLUTION INTRAMUSCULAR; INTRAVENOUS; SUBCUTANEOUS at 11:18

## 2019-02-21 RX ADMIN — PROPOFOL 160 MG: 10 INJECTION, EMULSION INTRAVENOUS at 10:48

## 2019-02-21 RX ADMIN — SODIUM CHLORIDE 100 ML/HR: 900 INJECTION, SOLUTION INTRAVENOUS at 05:26

## 2019-02-21 RX ADMIN — OXYCODONE HYDROCHLORIDE 10 MG: 5 TABLET ORAL at 19:34

## 2019-02-21 RX ADMIN — SODIUM CHLORIDE, POTASSIUM CHLORIDE, SODIUM LACTATE AND CALCIUM CHLORIDE: 600; 310; 30; 20 INJECTION, SOLUTION INTRAVENOUS at 11:50

## 2019-02-21 RX ADMIN — SODIUM CHLORIDE 125 ML/HR: 900 INJECTION, SOLUTION INTRAVENOUS at 21:41

## 2019-02-21 RX ADMIN — MIDAZOLAM HYDROCHLORIDE 1 MG: 1 INJECTION, SOLUTION INTRAMUSCULAR; INTRAVENOUS at 10:40

## 2019-02-21 RX ADMIN — FAMOTIDINE 20 MG: 20 TABLET ORAL at 14:59

## 2019-02-21 RX ADMIN — Medication 10 ML: at 03:23

## 2019-02-21 RX ADMIN — SUCCINYLCHOLINE CHLORIDE 140 MG: 20 INJECTION INTRAMUSCULAR; INTRAVENOUS at 10:48

## 2019-02-21 RX ADMIN — CEFAZOLIN SODIUM 2 G: 1 INJECTION, POWDER, FOR SOLUTION INTRAMUSCULAR; INTRAVENOUS at 10:55

## 2019-02-21 RX ADMIN — HYDROMORPHONE HYDROCHLORIDE 0.4 MG: 2 INJECTION, SOLUTION INTRAMUSCULAR; INTRAVENOUS; SUBCUTANEOUS at 12:00

## 2019-02-21 RX ADMIN — ONDANSETRON 4 MG: 2 INJECTION INTRAMUSCULAR; INTRAVENOUS at 11:18

## 2019-02-21 RX ADMIN — Medication 10 ML: at 21:42

## 2019-02-21 RX ADMIN — GEMFIBROZIL 300 MG: 600 TABLET ORAL at 16:06

## 2019-02-21 RX ADMIN — ROCURONIUM BROMIDE 10 MG: 10 INJECTION, SOLUTION INTRAVENOUS at 10:48

## 2019-02-21 RX ADMIN — IPRATROPIUM BROMIDE AND ALBUTEROL SULFATE 3 ML: .5; 3 SOLUTION RESPIRATORY (INHALATION) at 10:13

## 2019-02-21 RX ADMIN — SODIUM CHLORIDE: 9 INJECTION, SOLUTION INTRAVENOUS at 12:20

## 2019-02-21 RX ADMIN — OXYCODONE HYDROCHLORIDE 10 MG: 5 TABLET ORAL at 23:12

## 2019-02-21 RX ADMIN — FENTANYL CITRATE 50 MCG: 50 INJECTION, SOLUTION INTRAMUSCULAR; INTRAVENOUS at 10:48

## 2019-02-21 RX ADMIN — NEOSTIGMINE METHYLSULFATE 2 MG: 1 INJECTION INTRAVENOUS at 12:06

## 2019-02-21 RX ADMIN — ALBUMIN HUMAN 250 ML: 50 SOLUTION INTRAVENOUS at 11:40

## 2019-02-21 RX ADMIN — SODIUM CHLORIDE 125 ML/HR: 900 INJECTION, SOLUTION INTRAVENOUS at 13:23

## 2019-02-21 RX ADMIN — OXYCODONE HYDROCHLORIDE 10 MG: 5 TABLET ORAL at 16:05

## 2019-02-21 RX ADMIN — SODIUM CHLORIDE, POTASSIUM CHLORIDE, SODIUM LACTATE AND CALCIUM CHLORIDE: 600; 310; 30; 20 INJECTION, SOLUTION INTRAVENOUS at 10:30

## 2019-02-21 RX ADMIN — ACETAMINOPHEN 650 MG: 325 TABLET ORAL at 18:29

## 2019-02-21 RX ADMIN — LIDOCAINE HYDROCHLORIDE 60 MG: 20 INJECTION, SOLUTION EPIDURAL; INFILTRATION; INTRACAUDAL; PERINEURAL at 10:48

## 2019-02-21 RX ADMIN — FENTANYL CITRATE 25 MCG: 50 INJECTION, SOLUTION INTRAMUSCULAR; INTRAVENOUS at 13:45

## 2019-02-21 RX ADMIN — Medication 2 G: at 19:35

## 2019-02-21 RX ADMIN — Medication 80 MCG: at 12:00

## 2019-02-21 RX ADMIN — Medication 10 ML: at 15:00

## 2019-02-21 RX ADMIN — AMIODARONE HYDROCHLORIDE 0.5 MG/MIN: 50 INJECTION, SOLUTION INTRAVENOUS at 13:20

## 2019-02-21 RX ADMIN — ATORVASTATIN CALCIUM 20 MG: 20 TABLET, FILM COATED ORAL at 14:59

## 2019-02-21 RX ADMIN — FENTANYL CITRATE 25 MCG: 50 INJECTION, SOLUTION INTRAMUSCULAR; INTRAVENOUS at 13:40

## 2019-02-21 RX ADMIN — SENNOSIDES AND DOCUSATE SODIUM 1 TABLET: 8.6; 5 TABLET ORAL at 18:29

## 2019-02-21 RX ADMIN — ASPIRIN 81 MG 81 MG: 81 TABLET ORAL at 18:29

## 2019-02-21 RX ADMIN — ACETAMINOPHEN 650 MG: 325 TABLET ORAL at 23:12

## 2019-02-21 RX ADMIN — MORPHINE SULFATE 1 MG: 2 INJECTION, SOLUTION INTRAMUSCULAR; INTRAVENOUS at 14:34

## 2019-02-21 RX ADMIN — GLYCOPYRROLATE 0.2 MG: 0.2 INJECTION INTRAMUSCULAR; INTRAVENOUS at 12:06

## 2019-02-21 RX ADMIN — ALBUMIN HUMAN 250 ML: 50 SOLUTION INTRAVENOUS at 11:51

## 2019-02-21 NOTE — PROGRESS NOTES
PCU SHIFT NURSING NOTE      Bedside shift change report given to Rajwinder Valdivia (oncoming nurse) by Kayode Go (offgoing nurse). Report included the following information SBAR, Kardex, Intake/Output and Recent Results. Shift Summary:   2100- CHG bath. Linens changed. Patient tolerated well. Vss. Family at bedside. 0600- Patient remained NPO after MN. No issues identified overnight. Amio gtt continued. Will continue to monitor. Admission Date 2/19/2019   Admission Diagnosis Atrial fibrillation ([address] [I48.91]  Periprosthetic fracture around internal prosthetic hip joint [M97. 8XXA, Z96.649]  A-fib ([address] [I48.91]   Consults IP CONSULT TO ORTHOPEDIC SURGERY        Consults   []PT   []OT   []Speech   []Case Management      [] Palliative      Cardiac Monitoring Order   []Yes   []No     IV drips   []Yes    Drip:                            Dose:  Drip:                            Dose:  Drip:                            Dose:   []No     GI Prophylaxis   []Yes   []No         DVT Prophylaxis   SCDs:             Kalin stockings:         [] Medication   []Contraindicated   []None      Activity Level Activity Level: Logroll     Activity Assistance: Complete care   Purposeful Rounding every 1-2 hour? []Yes   Alegre Score  Total Score: 1   Bed Alarm (If score 3 or >)   []Yes   [] Refused (See signed refusal form in chart)   Osorio Score  Osorio Score: 16   Osorio Score (if score 14 or less)   []PMT consult   []Wound Care consult      []Specialty bed   [] Nutrition consult          Needs prior to discharge:   Home O2 required:    []Yes   []No    If yes, how much O2 required?     Other:    Last Bowel Movement:        Influenza Vaccine Received Flu Vaccine for Current Season (usually Sept-March): Yes        Pneumonia Vaccine           Diet Active Orders   Diet    DIET CARDIAC Regular; 2-3 GM NA      LDAs               Peripheral IV 02/19/19 Right Antecubital (Active)   Site Assessment Clean, dry, & intact 2/20/2019  4:41 PM Phlebitis Assessment 0 2/20/2019  4:41 PM   Infiltration Assessment 0 2/20/2019  4:41 PM   Dressing Status Clean, dry, & intact 2/20/2019  4:41 PM   Dressing Type Tape;Transparent 2/20/2019  4:41 PM   Hub Color/Line Status Pink 2/20/2019  4:41 PM   Action Taken Other (comment) 2/20/2019  4:30 AM   Alcohol Cap Used Yes 2/20/2019  4:30 AM       Peripheral IV 02/20/19 Distal;Posterior;Right Forearm (Active)   Site Assessment Clean, dry, & intact 2/20/2019  4:41 PM   Phlebitis Assessment 0 2/20/2019  4:41 PM   Infiltration Assessment 0 2/20/2019  4:41 PM   Dressing Status Clean, dry, & intact 2/20/2019  4:41 PM   Dressing Type Tape;Transparent 2/20/2019  4:41 PM   Hub Color/Line Status Pink 2/20/2019  4:41 PM   Action Taken Other (comment) 2/20/2019  4:30 AM   Alcohol Cap Used Yes 2/20/2019  4:30 AM                      Urinary Catheter      Intake & Output Date 02/19/19 1900 - 02/20/19 0659 02/20/19 0700 - 02/21/19 0659   Shift 7845-9153 24 Hour Total 6330-9697 0351-6460 24 Hour Total   INTAKE   P.O. 0 0 600  600     P. O. 0 0 600  600   I. V.(mL/kg/hr) 2511.3 2511.3 1249.7(1.3)  1249.7     Amiodarone Volume 681.3 681.3 149.7  149.7     Volume (0.9% sodium chloride infusion) 1830 1830 1100  1100   Blood   476.3  476.3     Volume (TRANSFUSE PACKED RBC'S)   476.3  476.3   Shift Total(mL/kg) 2511. 3(30.1) 2511. 3(30.1) 7172(92.4)  0074(95.7)   OUTPUT   Urine(mL/kg/hr) 1200 2600 2350(2.4)  2350     Urine Voided 1200 2600 2350  2350     Urine Occurrence(s) 1 x 1 x      Shift Total(mL/kg) 0706(08.9) 7309(27.1) 5763(05.8)  1269(99.1)   NET 1311.3 -88.7 -24  -24   Weight (kg) 83.3 83.3 83.3 83.3 83.3         Readmission Risk Assessment Tool Score Low Risk            7       Total Score        3 Has Seen PCP in Last 6 Months (Yes=3, No=0)    4 IP Visits Last 12 Months (1-3=4, 4=9, >4=11)        Criteria that do not apply:    . Living with Significant Other. Assisted Living. LTAC. SNF.  or   Rehab    Patient Length of Stay (>5 days = 3)    Pt.  Coverage (Medicare=5 , Medicaid, or Self-Pay=4)    Charlson Comorbidity Score (Age + Comorbid Conditions)       Expected Length of Stay 3d 14h   Actual Length of Stay 1

## 2019-02-21 NOTE — PROGRESS NOTES
1434 - pt states pain in hip 8/10. 1mg morphine administered per order. 1650 - pt given incentive spirometer. Education delivered on use of device, with return demonstration performed.

## 2019-02-21 NOTE — PERIOP NOTES
Placed call to Sanjeev Moya NP regarding the continuation of Amio gtt, order received to continue and send pt back to 2244 post op.

## 2019-02-21 NOTE — PROGRESS NOTES
[address]  [phone]      Cardiology Progress Note      2/21/2019 9:00AM    Admit Date: 2/19/2019    Admit Diagnosis:   Atrial fibrillation ([facility] [address] [I48.91]  Periprosthetic fracture around internal prosthetic hip joint [M97. 8XXA, Z96.649]  A-fib ([facility] [address] [I48.91]    Subjective:     Neo Carlson has no c/o CP, SOB. Converted to SR last evening. Continues on IV amio until after ortho surgery.      Visit Vitals  /78 (BP 1 Location: Left arm, BP Patient Position: At rest)   Pulse 74   Temp (P) 98.5 °F (36.9 °C)   Resp 18   Ht 5' 6\" (1.676 m)   Wt 83 kg (183 lb)   SpO2 100%   BMI 29.54 kg/m²       Current Facility-Administered Medications   Medication Dose Route Frequency    lactated Ringers infusion  25 mL/hr IntraVENous CONTINUOUS    sodium chloride (NS) flush 5-40 mL  5-40 mL IntraVENous Q8H    sodium chloride (NS) flush 5-40 mL  5-40 mL IntraVENous PRN    lidocaine (PF) (XYLOCAINE) 10 mg/mL (1 %) injection 0.1 mL  0.1 mL SubCUTAneous PRN    ceFAZolin (ANCEF) 2 g/20 mL in sterile water IV syringe  2 g IntraVENous ON CALL TO OR    0.9% sodium chloride infusion 250 mL  250 mL IntraVENous PRN    amiodarone (CORDARONE) 375 mg/250 mL D5W infusion  0.5 mg/min IntraVENous CONTINUOUS    glucose chewable tablet 16 g  4 Tab Oral PRN    dextrose (D50W) injection syrg 12.5-25 g  12.5-25 g IntraVENous PRN    glucagon (GLUCAGEN) injection 1 mg  1 mg IntraMUSCular PRN    0.9% sodium chloride infusion  100 mL/hr IntraVENous CONTINUOUS    aspirin chewable tablet 81 mg  81 mg Oral BID    atorvastatin (LIPITOR) tablet 20 mg  20 mg Oral DAILY    famotidine (PEPCID) tablet 20 mg  20 mg Oral ACB    gemfibrozil (LOPID) tablet 300 mg  300 mg Oral ACB&D    oxyCODONE IR (ROXICODONE) tablet 5 mg  5 mg Oral Q4H PRN    sodium chloride (NS) flush 5-40 mL  5-40 mL IntraVENous Q8H    sodium chloride (NS) flush 5-40 mL  5-40 mL IntraVENous PRN    acetaminophen (TYLENOL) tablet 650 mg  650 mg Oral Q4H PRN    naloxone (NARCAN) injection 0.4 mg  0.4 mg IntraVENous PRN    ondansetron (ZOFRAN) injection 4 mg  4 mg IntraVENous Q4H PRN    nicotine (NICODERM CQ) 21 mg/24 hr patch 1 Patch  1 Patch TransDERmal Q24H    polyethylene glycol (MIRALAX) packet 17 g  17 g Oral DAILY    senna-docusate (PERICOLACE) 8.6-50 mg per tablet 1 Tab  1 Tab Oral DAILY    bisacodyl (DULCOLAX) suppository 10 mg  10 mg Rectal DAILY PRN    insulin lispro (HUMALOG) injection   SubCUTAneous AC&HS     Facility-Administered Medications Ordered in Other Encounters   Medication Dose Route Frequency    ceFAZolin (ANCEF) injection   IntraVENous PRN    midazolam (VERSED) injection   IntraVENous PRN    lidocaine (PF) (XYLOCAINE) 20 mg/mL (2 %) injection   IntraVENous PRN    fentaNYL citrate (PF) injection   IntraVENous PRN    rocuronium (ZEMURON) injection   IntraVENous PRN    propofol (DIPRIVAN) 10 mg/mL injection   IntraVENous PRN    succinylcholine (ANECTINE) injection   IntraVENous PRN       Objective:      Physical Exam:  General Appearance:  older  male in no acute distress  Chest:   Clear  Cardiovascular:  Regular rate and rhythm, no murmur.   Abdomen:   Soft, non-tender, bowel sounds are active.   Extremities: no peripheral edema  Skin:  Warm and dry.     Data Review:   Recent Labs     02/21/19  0322 02/20/19  1156 02/20/19  0211   WBC 6.1  --  7.2   HGB 7.8* 8.3* 6.1*   HCT 23.4* 25.3* 18.8*     --  244     Recent Labs     02/20/19  0211 02/19/19  0835    135*   K 5.1 4.5    102   CO2 22 23   GLU 79 98   BUN 37* 57*   CREA 1.37* 1.84*   CA 7.8* 9.1   MG 1.8 2.2   PHOS 3.3  --        Recent Labs     02/19/19  0835   TROIQ <0.05         Intake/Output Summary (Last 24 hours) at 2/21/2019 1104  Last data filed at 2/21/2019 0715  Gross per 24 hour   Intake 3208 ml   Output 3075 ml   Net 133 ml        Telemetry: SR      Assessment:     Active Problems:    Atrial fibrillation ([facility] [address] (2/19/2019)      Periprosthetic fracture around internal prosthetic hip joint (2/19/2019)      A-fib ([facility] [address] (2/19/2019)        Plan:     Afib with RVR:  Converted to SR, rate controlled on IV amiodarone  New onset, or first documented as patient cannot feel and tell that he is in afib. Continues with low BP, unable to start AV archana blocking agents, continue IV amio bolus and infusion for rate control until after ortho finishes surgery  CHADS2 vasc score: 2, lovenox discontinued due to anemia and significant drop in HGB. Not GIB, but anemia. Consider long term DOAC once HGB stabilizes post surgery  Need to consider whether this was a surgical blood loss or if there is any concern for GI bleed and if can anticoagulate  Echo preliminary report shows EF normal, RV dil.       Hypotension:  Improving, continue holding antiHTN meds      Anemia:  Transfusing per Hospitalist    Plan for ortho surgery today      Alison Amin [facility]  Cardiology    Patient seen and examined by me with nurse practitioner Alison Amin. I personally performed all components of the history, physical, and medical decision making and agree with the assessment and plan with minor modifications as noted. AF has stabilized. Is anemia due to perioperative blood loss or is there any concern for GI blood loss? Need to decide about anticoagulation.

## 2019-02-21 NOTE — PERIOP NOTES
TRANSFER - OUT REPORT:    Verbal report given to Andrew DONOVAN(name) on Gris Norris  being transferred to PCU/2244(unit) for routine post - op       Report consisted of patients Situation, Background, Assessment and   Recommendations(SBAR). Information from the following report(s) SBAR, OR Summary, Intake/Output, MAR, Recent Results and Cardiac Rhythm SB/NSR with occasional PVC was reviewed with the receiving nurse. Opportunity for questions and clarification was provided.       Patient transported with:   Monitor  O2 @ 2 liters  Registered Nurse

## 2019-02-21 NOTE — ANESTHESIA POSTPROCEDURE EVALUATION
Procedure(s): REVISION OF PERIPROSTHETIC FRACTURE. Anesthesia Post Evaluation Patient location during evaluation: PACU Note status: Adequate. Level of consciousness: responsive to verbal stimuli and sleepy but conscious Pain management: satisfactory to patient Airway patency: patent Anesthetic complications: no 
Cardiovascular status: acceptable Respiratory status: acceptable Hydration status: acceptable Comments: +Post-Anesthesia Evaluation and Assessment Patient: Colin Rosales MRN: [id number]  SSN: [id number] YOB: 1955  Age: 62 y.o. Sex: male Cardiovascular Function/Vital Signs /60   Pulse (!) 56   Temp 36.5 °C (97.7 °F)   Resp 15   Ht 5' 6\" (1.676 m)   Wt 83 kg (183 lb)   SpO2 98%   BMI 29.54 kg/m² Patient is status post Procedure(s): REVISION OF PERIPROSTHETIC FRACTURE. Nausea/Vomiting: Controlled. Postoperative hydration reviewed and adequate. Pain: 
Pain Scale 1: Numeric (0 - 10) (02/21/19 0948) Pain Intensity 1: 1 (02/21/19 0948) Managed. Neurological Status:  
Neuro (WDL): Within Defined Limits(Denies numbness or tingling to extremeties) (02/21/19 0959) At baseline. Mental Status and Level of Consciousness: Arousable. Pulmonary Status:  
O2 Device: Nasal cannula (02/21/19 1245) Adequate oxygenation and airway patent. Complications related to anesthesia: None Post-anesthesia assessment completed. No concerns. Signed By: Angel Luis Hitchcock MD  
 2/21/2019 Post anesthesia nausea and vomiting:  controlled Visit Vitals /60 Pulse (!) 56 Temp 36.5 °C (97.7 °F) Resp 15 Ht 5' 6\" (1.676 m) Wt 83 kg (183 lb) SpO2 98% BMI 29.54 kg/m²

## 2019-02-21 NOTE — PROGRESS NOTES
2018 - TRANSFER - OUT REPORT:    Verbal report given to Destini Davila (name) on [facility]  being transferred to Pre op(unit) for ordered procedure       Report consisted of patients Situation, Background, Assessment and   Recommendations(SBAR). Information from the following report(s) SBAR, Kardex, Intake/Output and MAR was reviewed with the receiving nurse. Lines:   Peripheral IV 02/19/19 Right Antecubital (Active)   Site Assessment Clean, dry, & intact 2/21/2019  4:00 AM   Phlebitis Assessment 0 2/21/2019  4:00 AM   Infiltration Assessment 0 2/21/2019  4:00 AM   Dressing Status Clean, dry, & intact 2/21/2019  4:00 AM   Dressing Type Transparent 2/21/2019  4:00 AM   Hub Color/Line Status Flushed 2/21/2019  4:00 AM   Action Taken Other (comment) 2/21/2019  4:00 AM   Alcohol Cap Used Yes 2/21/2019  4:00 AM       Peripheral IV 02/20/19 Distal;Posterior;Right Forearm (Active)   Site Assessment Clean, dry, & intact 2/21/2019  4:00 AM   Phlebitis Assessment 0 2/21/2019  4:00 AM   Infiltration Assessment 0 2/21/2019  4:00 AM   Dressing Status Clean, dry, & intact 2/21/2019  4:00 AM   Dressing Type Transparent 2/21/2019  4:00 AM   Hub Color/Line Status Flushed 2/21/2019  4:00 AM   Action Taken Other (comment) 2/21/2019  4:00 AM   Alcohol Cap Used Yes 2/21/2019  4:00 AM        Opportunity for questions and clarification was provided. Patient transported with:   Monitor  Registered Nurse  Tech     1356 - TRANSFER - IN REPORT:    Verbal report received from Mony(name) on [facility]  being received from PACU(unit) for routine post - op      Report consisted of patients Situation, Background, Assessment and   Recommendations(SBAR). Information from the following report(s) SBAR, Kardex, OR Summary, Procedure Summary, Intake/Output and MAR was reviewed with the receiving nurse. Opportunity for questions and clarification was provided.

## 2019-02-21 NOTE — PROGRESS NOTES
Ortho/ NeuroSurgery NP Note    POD# 0  s/p REVISION OF PERIPROSTHETIC FRACTURE     Pt resting in bed. Pain 6/10 presently. Had IV Morphine but would like oral pain med based on education we provided. VSS Afebrile. Patient has had something to eat/drink. No nausea. Most Recent Labs:   Lab Results   Component Value Date/Time    HGB 7.8 (L) 02/21/2019 03:22 AM    Hemoglobin A1c 4.8 02/04/2019 09:29 AM       Body mass index is 29.54 kg/m². Reference: BMI greater than 30 is classified as obesity and greater than 40 is classified as morbid obesity. Voiding status: Patient is voiding in adequate amounts. Dressing c.d.i    Calves soft and supple; No pain with passive stretch  Sensation and motor intact  SCDs for mechanical DVT proph    Plan:  1) PT BID starting tomorrow  2) Agustina-op Antibiotics Ancef  3) Aspirin 81 mg PO BID for DVT Prophylaxis  4) Pepcid for GI Prophylaxis  5) Discharge plans to home with his children.      Brianda Ann NP

## 2019-02-21 NOTE — BRIEF OP NOTE
BRIEF OPERATIVE NOTE    Date of Procedure: 2/21/2019   Preoperative Diagnosis: periprosthetic fracture  Postoperative Diagnosis: periprosthetic fracture    Procedure(s):  REVISION OF FEMORAL COMPONENT, ORIF PERIPROSTHETIC FRACTURE  Surgeon(s) and Role:     Betito Ray MD - Primary         Surgical Assistant: Cary Kapoor    Surgical Staff:  Circ-1: Mignon Montiel: Consuelo Perera RN  Physician Assistant: REMBERTO Cummings  Scrub Tech-1: Kirsten Verduzco  Scrub RN-1: Jami Mcduffie RN  Event Time In Time Out   Incision Start 1107    Incision Close       Anesthesia: General   Estimated Blood Loss: 450  Specimens: * No specimens in log *   Findings: n/a   Complications: none  Implants:   Implant Name Type Inv.  Item Serial No.  Lot No. LRB No. Used Action   CABLE W CRIMP [id number] SS -- STRL - SN/A  CABLE W CRIMP [id number] SS -- STRL N/A [employer] [place] [id number] Right 1 Implanted   CABLE W CRIMP [id number] SS -- STRL - SN/A  CABLE W CRIMP [id number] SS -- STRL N/A [employer] [place] [id number] Right 1 Implanted   CABLE W CRIMP [id number] SS -- STRL - SN/A  CABLE W CRIMP [id number] SS -- Anne Bough [place] [id number] Right 1 Implanted   revision stem press fit, standard offset    [id number]  N/A Right 1 Implanted   STEM [facility] W/HA STD OFFST SZ13 -- COLLARED - [id number]  STEM ELMNT W/HA STD OFFST UM31 -- COLLARED [id number] [employer] N/A Right 1 Explanted   HEAD FEM CER 36MM OD -3.5MM -- BIOL[employer] DELTA - [id number]  HEAD FEM CER 36MM OD -3.5MM -- BIOLOX DELTA [id number] [employer] N/A Right 1 Explanted   HEAD FEM CER 36MM OD +0MM -- BIOLOX DELTA - [id number]  HEAD FEM CER 36MM OD +0MM -- BIOLOX DELTA [id number] [employer] N/A Right 1 Implanted   CABLE W CRIMP [id number] SS -- STRL - SN/A  CABLE W CRIMP [id number] SS -- STRL N/A [employer] [place] [id number] Right 1 Implanted   CABLE W CRIMP [id number] SS -- STRL - SN/A  CABLE W CRIMP [id number] SS -- Omid Brock [place] [id number] Right 1 Implanted

## 2019-02-21 NOTE — PROGRESS NOTES
Problem: Pressure Injury - Risk of  Goal: *Prevention of pressure injury  Document Osorio Scale and appropriate interventions in the flowsheet. Outcome: Progressing Towards Goal  Pressure Injury Interventions:  Sensory Interventions: Assess changes in LOC, Turn and reposition approx. every two hours (pillows and wedges if needed), Monitor skin under medical devices         Activity Interventions: PT/OT evaluation    Mobility Interventions: Turn and reposition approx. every two hours(pillow and wedges)    Nutrition Interventions: Offer support with meals,snacks and hydration    Friction and Shear Interventions: Lift sheet, Transferring/repositioning devices               Problem: Falls - Risk of  Goal: *Absence of Falls  Document Meghann Fall Risk and appropriate interventions in the flowsheet.   Outcome: Progressing Towards Goal  Fall Risk Interventions:            Medication Interventions: Evaluate medications/consider consulting pharmacy, Teach patient to arise slowly    Elimination Interventions: Bed/chair exit alarm, Call light in reach, Toileting schedule/hourly rounds

## 2019-02-21 NOTE — ANESTHESIA PREPROCEDURE EVALUATION
Anesthetic History No history of anesthetic complications Review of Systems / Medical History Patient summary reviewed, nursing notes reviewed and pertinent labs reviewed Pulmonary Within defined limits COPD: mild Smoker Neuro/Psych Within defined limits Cardiovascular Hypertension: well controlled Dysrhythmias : atrial fibrillation CAD and hyperlipidemia Exercise tolerance: >4 METS Comments: ECHO yesterday showed a 56-60% EF with mild TR and pulmonary hypertension GI/Hepatic/Renal 
Within defined limits Renal disease: CRI Endo/Other Diabetes: well controlled, type 2, using insulin Obesity, arthritis and anemia Other Findings Comments: Chronic pain S/P hip replacement Physical Exam 
 
Airway Mallampati: II 
 
Neck ROM: normal range of motion Mouth opening: Normal 
 
 Cardiovascular Rhythm: regular Rate: normal 
 
 
 
 Dental 
 
Dentition: Poor dentition Comments: 3 remaining teeth, denies any loose Pulmonary Breath sounds clear to auscultation Abdominal 
GI exam deferred Other Findings Anesthetic Plan ASA: 3 Anesthesia type: general 
 
Monitoring Plan: BIS Induction: Intravenous Anesthetic plan and risks discussed with: Patient and Son / Daughter Case cancelled yesterday for new onset a-fib, now on an Amiodarone drip.

## 2019-02-21 NOTE — PERIOP NOTES
2734:  TRANSFER - IN REPORT:    Verbal report received from Danay Portillo RN on Natasha Sandi  being received from 2244 for ordered procedure      Report consisted of patients Situation, Background, Assessment and   Recommendations(SBAR). Information from the following report(s) SBAR, Kardex, Intake/Output, MAR and Recent Results was reviewed with the receiving nurse. Opportunity for questions and clarification was provided. Assessment completed upon patients arrival to unit and care assumed. 1010:  Dr. Letha Osuna made aware of respiratory status & lab results. Duo-neb ordered. Medicated per MAR. 1017:  Breathing treatment in progress. 1028:  Duo-Neb complete.   Lung sounds improved slightly still posterior bases coarse with slight expiratory wheeze left lower posterior base

## 2019-02-22 LAB
ABO + RH BLD: NORMAL
ANION GAP SERPL CALC-SCNC: 8 MMOL/L (ref 5–15)
BLD PROD TYP BPU: NORMAL
BLD PROD TYP BPU: NORMAL
BLOOD GROUP ANTIBODIES SERPL: NORMAL
BPU ID: NORMAL
BPU ID: NORMAL
BUN SERPL-MCNC: 16 MG/DL (ref 6–20)
BUN/CREAT SERPL: 16 (ref 12–20)
CALCIUM SERPL-MCNC: 7.2 MG/DL (ref 8.5–10.1)
CHLORIDE SERPL-SCNC: 105 MMOL/L (ref 97–108)
CO2 SERPL-SCNC: 23 MMOL/L (ref 21–32)
CREAT SERPL-MCNC: 1.03 MG/DL (ref 0.7–1.3)
CROSSMATCH RESULT,%XM: NORMAL
CROSSMATCH RESULT,%XM: NORMAL
GLUCOSE BLD STRIP.AUTO-MCNC: 182 MG/DL (ref 65–100)
GLUCOSE SERPL-MCNC: 96 MG/DL (ref 65–100)
HGB BLD-MCNC: 7.9 G/DL (ref 12.1–17)
POTASSIUM SERPL-SCNC: 4.2 MMOL/L (ref 3.5–5.1)
SERVICE CMNT-IMP: ABNORMAL
SODIUM SERPL-SCNC: 136 MMOL/L (ref 136–145)
SPECIMEN EXP DATE BLD: NORMAL
STATUS OF UNIT,%ST: NORMAL
STATUS OF UNIT,%ST: NORMAL
UNIT DIVISION, %UDIV: 0
UNIT DIVISION, %UDIV: 0

## 2019-02-22 PROCEDURE — 74011250637 HC RX REV CODE- 250/637: Performed by: INTERNAL MEDICINE

## 2019-02-22 PROCEDURE — 97165 OT EVAL LOW COMPLEX 30 MIN: CPT | Performed by: OCCUPATIONAL THERAPIST

## 2019-02-22 PROCEDURE — 74011250637 HC RX REV CODE- 250/637: Performed by: PHYSICIAN ASSISTANT

## 2019-02-22 PROCEDURE — 74011250637 HC RX REV CODE- 250/637: Performed by: NURSE PRACTITIONER

## 2019-02-22 PROCEDURE — 85018 HEMOGLOBIN: CPT

## 2019-02-22 PROCEDURE — 97110 THERAPEUTIC EXERCISES: CPT

## 2019-02-22 PROCEDURE — 77010033678 HC OXYGEN DAILY

## 2019-02-22 PROCEDURE — 97530 THERAPEUTIC ACTIVITIES: CPT | Performed by: OCCUPATIONAL THERAPIST

## 2019-02-22 PROCEDURE — 80048 BASIC METABOLIC PNL TOTAL CA: CPT

## 2019-02-22 PROCEDURE — 65660000000 HC RM CCU STEPDOWN

## 2019-02-22 PROCEDURE — 36415 COLL VENOUS BLD VENIPUNCTURE: CPT

## 2019-02-22 PROCEDURE — 97161 PT EVAL LOW COMPLEX 20 MIN: CPT

## 2019-02-22 PROCEDURE — 97116 GAIT TRAINING THERAPY: CPT

## 2019-02-22 PROCEDURE — 74011250637 HC RX REV CODE- 250/637: Performed by: HOSPITALIST

## 2019-02-22 PROCEDURE — 74011250636 HC RX REV CODE- 250/636: Performed by: PHYSICIAN ASSISTANT

## 2019-02-22 PROCEDURE — 82962 GLUCOSE BLOOD TEST: CPT

## 2019-02-22 PROCEDURE — 74011250636 HC RX REV CODE- 250/636: Performed by: NURSE PRACTITIONER

## 2019-02-22 RX ORDER — AMIODARONE HYDROCHLORIDE 200 MG/1
200 TABLET ORAL DAILY
Status: DISCONTINUED | OUTPATIENT
Start: 2019-02-23 | End: 2019-02-25 | Stop reason: HOSPADM

## 2019-02-22 RX ORDER — AMIODARONE HYDROCHLORIDE 200 MG/1
400 TABLET ORAL
Status: COMPLETED | OUTPATIENT
Start: 2019-02-22 | End: 2019-02-22

## 2019-02-22 RX ADMIN — DEXAMETHASONE SODIUM PHOSPHATE 10 MG: 4 INJECTION, SOLUTION INTRAMUSCULAR; INTRAVENOUS at 09:44

## 2019-02-22 RX ADMIN — ACETAMINOPHEN 650 MG: 325 TABLET ORAL at 23:24

## 2019-02-22 RX ADMIN — ASPIRIN 81 MG 81 MG: 81 TABLET ORAL at 18:35

## 2019-02-22 RX ADMIN — GEMFIBROZIL 300 MG: 600 TABLET ORAL at 16:34

## 2019-02-22 RX ADMIN — SODIUM CHLORIDE 125 ML/HR: 900 INJECTION, SOLUTION INTRAVENOUS at 05:37

## 2019-02-22 RX ADMIN — ACETAMINOPHEN 650 MG: 325 TABLET ORAL at 13:15

## 2019-02-22 RX ADMIN — SENNOSIDES AND DOCUSATE SODIUM 1 TABLET: 8.6; 5 TABLET ORAL at 09:46

## 2019-02-22 RX ADMIN — Medication 2 G: at 01:50

## 2019-02-22 RX ADMIN — Medication 10 ML: at 21:26

## 2019-02-22 RX ADMIN — OXYCODONE HYDROCHLORIDE 10 MG: 5 TABLET ORAL at 09:46

## 2019-02-22 RX ADMIN — AMIODARONE HYDROCHLORIDE 400 MG: 200 TABLET ORAL at 09:45

## 2019-02-22 RX ADMIN — ATORVASTATIN CALCIUM 20 MG: 20 TABLET, FILM COATED ORAL at 09:46

## 2019-02-22 RX ADMIN — POLYETHYLENE GLYCOL 3350 17 G: 17 POWDER, FOR SOLUTION ORAL at 09:46

## 2019-02-22 RX ADMIN — GEMFIBROZIL 300 MG: 600 TABLET ORAL at 09:47

## 2019-02-22 RX ADMIN — FAMOTIDINE 20 MG: 20 TABLET ORAL at 09:46

## 2019-02-22 RX ADMIN — AMIODARONE HYDROCHLORIDE 0.5 MG/MIN: 50 INJECTION, SOLUTION INTRAVENOUS at 02:33

## 2019-02-22 RX ADMIN — ACETAMINOPHEN 650 MG: 325 TABLET ORAL at 05:37

## 2019-02-22 RX ADMIN — OXYCODONE HYDROCHLORIDE 10 MG: 5 TABLET ORAL at 13:15

## 2019-02-22 RX ADMIN — SENNOSIDES AND DOCUSATE SODIUM 1 TABLET: 8.6; 5 TABLET ORAL at 18:35

## 2019-02-22 RX ADMIN — OXYCODONE HYDROCHLORIDE 10 MG: 5 TABLET ORAL at 05:37

## 2019-02-22 RX ADMIN — ACETAMINOPHEN 650 MG: 325 TABLET ORAL at 18:35

## 2019-02-22 RX ADMIN — ASPIRIN 81 MG 81 MG: 81 TABLET ORAL at 09:46

## 2019-02-22 NOTE — PROGRESS NOTES
Initial Nutrition Assessment:    INTERVENTIONS/RECOMMENDATIONS:   · Meals/Snacks: General/healthful diet: Continue current diet; monitor BG and need for consistent carb restrictions    ASSESSMENT:   Patient medically noted for AFIB and periprosthetic hip fracture s/p revision. PMH for DM and recent right hip replacement. Currently receiving a cardiac diet. Patient had eaten majority of his breakfast. Reports a good appetite currently and PTA. Aware of menu and room service. BG currently well controlled. No nutrition questions/concerns at this time. Encouraged intake of meals. Diet Order: Cardiac(2-3g Na)  % Eaten:    Patient Vitals for the past 72 hrs:   % Diet Eaten   02/21/19 1335 0 %       Pertinent Medications: [x]Reviewed []Other: Atorvastatin, Decadron, Famotidine, Humalog, Miralax, pericolace   Pertinent Labs: [x]Reviewed []Other:   Food Allergies: [x]None []Other   Last BM: 2/19  [x]Active     []Hyperactive  []Hypoactive       [] Absent BS  Skin:    [x] Intact   [] Incision  [] Breakdown: [] Edema []Other:    Anthropometrics:   Height: 5' 6\" (167.6 cm) Weight: 91 kg (200 lb 9.9 oz)   IBW (%IBW):   ( ) UBW (%UBW):   (  %)   Last Weight Metrics:  Weight Loss Metrics 2/22/2019 2/19/2019 2/11/2019 2/4/2019 2/18/2016   Today's Wt 200 lb 9.9 oz - 181 lb 10.5 oz 183 lb 197 lb 5 oz   BMI - 32.38 kg/m2 29.32 kg/m2 29.54 kg/m2 31.86 kg/m2       BMI: Body mass index is 32.38 kg/m². This BMI is indicative of:   []Underweight    []Normal    []Overweight    [x] Obesity   [] Extreme Obesity (BMI>40)     Estimated Nutrition Needs (Based on):   2142 Kcals/day(BMR (1648) x 1. 3AF) , 91 g(1.0 g/kg bw) Protein  Carbohydrate:  At Least 130 g/day  Fluids: 2150 mL/day (1ml/kcal)    Pt expected to meet estimated nutrient needs: [x]Yes []No    NUTRITION DIAGNOSES:   Problem:  No nutritional diagnosis at this time      Etiology: related to       Signs/Symptoms: as evidenced by        NUTRITION INTERVENTIONS:  Meals/Snacks: General/healthful diet                  GOAL:   PO intake >70% of meals next 5-7 days    LEARNING NEEDS (Diet, Food/Nutrient-Drug Interaction):    [x] None Identified   [] Identified and Education Provided/Documented   [] Identified and Pt declined/was not appropriate     Cultural, Buddhism, OR Ethnic Dietary Needs:    [x] None Identified   [] Identified and Addressed     [x] Interdisciplinary Care Plan Reviewed/Documented    [x] Discharge Planning:  Consistent carb/heart healthy diet      MONITORING /EVALUATION:   Food/Nutrient Intake Outcomes:  Total energy intake  Physical Signs/Symptoms Outcomes: Weight/weight change    NUTRITION RISK:    [] High              [] Moderate           [x]  Low  []  Minimal/Uncompromised    PT SEEN FOR:    [x]  MD Consult: []Calorie Count      []Diabetic Diet Education        []Diet Education     []Electrolyte Management     [x]General Nutrition Management and Supplements     []Management of Tube Feeding     []TPN Recommendations    []  RN Referral:  []MST score >=2     []Enteral/Parenteral Nutrition PTA     []Pregnant: Gestational DM or Multigestation     []Pressure Ulcer/Wound Care needs        []  Low BMI  []  LOS Caryle Meline  Pager [phone]                 Weekend Pager [phone]

## 2019-02-22 NOTE — PROGRESS NOTES
Physical Therapy  Orders received, chart reviewed and patient evaluated by physical therapy. Recommend patient to discharge to home with HHPT pending progress with skilled acute care physical therapy. Recommend with nursing patient to complete as able in order to maintain strength, endurance and independence: OOB to chair 3x/day with assist x 1 and ambulating with RW. Thank you for your assistance. Full evaluation to follow.

## 2019-02-22 NOTE — PROGRESS NOTES
CM consult noted for home health. CM met with pt and discussed home health and pt doesn't want [facility] that he had prior to admission. Provided pt with a list of [facility] companies and he didn't have a preference. Discussed with pt his insurance and location may limit the choice. CM called [facility] and they don't cover his location. CM called [facility] and they don't cover pt's location. CM sent referrals to [facility], [facility] and [facility] via allscripts. Pt states his family will help him at discharge. Pt's son and son's girlfriend live with pt and can drive pt home at discharge. Pt also has a wc in addition to a rolling walker at home. 5:15pm - CM sent more referrals out to other [facility] companies and most are not accepting due to location and insurance.      Mayelin Wray, [phone]

## 2019-02-22 NOTE — PROGRESS NOTES
Problem: Mobility Impaired (Adult and Pediatric)  Goal: *Acute Goals and Plan of Care (Insert Text)  Physical Therapy Goals  Initiated 2/22/2019    1. Patient will transfer from bed to chair and chair to bed with modified independence using the least restrictive device within 7 day(s). 2.  Patient will perform sit to stand with independence within 7 day(s). 3.  Patient will ambulate with modified independence for 300 feet with the least restrictive device within 7 day(s). 4.  Patient will be I with R hip HEP within 7 days  physical Therapy EVALUATION  Patient: Nahid Lima (5 y.o. male)  Date: 2/22/2019  Primary Diagnosis: Atrial fibrillation ([address] [I48.91]  Periprosthetic fracture around internal prosthetic hip joint [M97. 8XXA, Z96.649]  A-fib ([address] [I48.91]  Procedure(s) (LRB):  REVISION OF PERIPROSTHETIC FRACTURE (Right) 1 Day Post-Op   Precautions: TOE TOUCH WEIGHT BEARING RLE       ASSESSMENT :  Based on the objective data described below, the patient presents with generalized weakness, RLE decreased strength,  decreased RLE weight bearing, post/op pain, decreased ambulation, decreased balance and decreased activity tolerance. Pt requiring SBA to [facility] with mobility. Verbal and visual cues given for TTWB RLE with good return demo. Pt initially on 2L of O2 and 99%. O2 removed and pt remained 94-95% with activity and post activity on RA. BP and HR stable. HHPT recommended at discharge to further address RLE strength, gait, balance, and activity tolerance. Patient will benefit from skilled intervention to address the above impairments.   Patients rehabilitation potential is considered to be Good  Factors which may influence rehabilitation potential include:   [x]         None noted  []         Mental ability/status  []         Medical condition  []         Home/family situation and support systems  []         Safety awareness  []         Pain tolerance/management  []         Other: PLAN :  Recommendations and Planned Interventions:  []           Bed Mobility Training             []    Neuromuscular Re-Education  []           Transfer Training                   []    Orthotic/Prosthetic Training  []           Gait Training                         []    Modalities  []           Therapeutic Exercises           []    Edema Management/Control  []           Therapeutic Activities            []    Patient and Family Training/Education  []           Other (comment):    Frequency/Duration: Patient will be followed by physical therapy  twice daily to address goals. Discharge Recommendations: Home Health  Further Equipment Recommendations for Discharge: none     SUBJECTIVE:   Patient stated I'll work with you.     OBJECTIVE DATA SUMMARY:   HISTORY:    Past Medical History:   Diagnosis Date    Arthritis     Atrial fibrillation ([facility]     CAD (coronary artery disease)     lipids    Chronic obstructive pulmonary disease (HCC)     Chronic pain     Diabetes ([facility]     Hypertension     Infectious disease      Past Surgical History:   Procedure Laterality Date    HX HIP REPLACEMENT Right 02/11/2019    Acetabular bone graft    HX TONSILLECTOMY       Prior Level of Function/Home Situation: Pt was receiving HHPT s/p R THR anterior approach  Personal factors and/or comorbidities impacting plan of care: none    Home Situation  Home Environment: Independent living  # Steps to Enter: 1(landing at front)  One/Two Story Residence: One story  Living Alone: No  Support Systems: Family member(s)  Patient Expects to be Discharged to[de-identified] Private residence  Current DME Used/Available at Home: Commode, bedside  Tub or Shower Type: Shower    EXAMINATION/PRESENTATION/DECISION MAKING:   Critical Behavior:  Neurologic State: Alert  Orientation Level: Oriented X4  Cognition: Follows commands     Hearing:   Auditory  Auditory Impairment: None  Hearing Aids/Status: Does not own  Range Of Motion:  AROM: Generally decreased, functional           PROM: (RLE decreased but functional)           Strength:    Strength: Generally decreased, functional                    Tone & Sensation:   Tone: Normal              Sensation: Intact               Coordination:  Coordination: Within functional limits  Vision:      Functional Mobility:  Bed Mobility:  Rolling: Stand-by assistance  Supine to Sit: Stand-by assistance  Sit to Supine: Stand-by assistance  Scooting: Independent  Transfers:  Sit to Stand: Stand-by assistance(verbal cue to push from surface)  Stand to Sit: Stand-by assistance  Stand Pivot Transfers: Stand-by assistance                    Balance:   Sitting: Intact  Standing: Intact; With support  Ambulation/Gait Training:  Distance (ft): 60 Feet (ft)  Assistive Device: Walker, rolling;Gait belt  Ambulation - Level of Assistance: Contact guard assistance        Gait Abnormalities: Decreased step clearance; Step to gait  Right Side Weight Bearing: Toe touch  Left Side Weight Bearing: Full  Base of Support: Widened  Stance: Right decreased  Speed/Rayne: Pace decreased (<100 feet/min)  Step Length: Left shortened;Right shortened          Therapeutic Exercises:   Supine:  Glut set, quad set, ankle pumps, hs, hip abd/add 10 reps each  All AROM    Functional Measure:  Barthel Index:    Bathin  Bladder: 10  Bowels: 10  Groomin  Dressing: 10  Feeding: 10  Mobility: 10  Stairs: 0  Toilet Use: 10  Transfer (Bed to Chair and Back): 15  Total: 80         The Barthel ADL Index: Guidelines  1. The index should be used as a record of what a patient does, not as a record of what a patient could do. 2. The main aim is to establish degree of independence from any help, physical or verbal, however minor and for whatever reason. 3. The need for supervision renders the patient not independent. 4. A patient's performance should be established using the best available evidence.  Asking the patient, friends/relatives and nurses are the usual sources, but direct observation and common sense are also important. However direct testing is not needed. 5. Usually the patient's performance over the preceding 24-48 hours is important, but occasionally longer periods will be relevant. 6. Middle categories imply that the patient supplies over 50 per cent of the effort. 7. Use of aids to be independent is allowed. Deeann Beavers., Barthel, D.W. (1348). Functional evaluation: the Barthel Index. [facility] (14)2. Yomi Light juan jose MACI Orta, Jai Rubio., Hiram Faria., [place], [facility] (1999). Measuring the change indisability after inpatient rehabilitation; comparison of the responsiveness of the Barthel Index and Functional Anderson Measure. Journal of Neurology, Neurosurgery, and Psychiatry, 66(4), 797-419. MYLENE Michaels, RAFAEL Hart, & Colin Hyde M.A. (2004.) Assessment of post-stroke quality of life in cost-effectiveness studies: The usefulness of the Barthel Index and the EuroQoL-5D. Quality of Life Research, 13, 427-43         The Barthel ADL Index: Guidelines  1. The index should be used as a record of what a patient does, not as a record of what a patient could do. 2. The main aim is to establish degree of independence from any help, physical or verbal, however minor and for whatever reason. 3. The need for supervision renders the patient not independent. 4. A patient's performance should be established using the best available evidence. Asking the patient, friends/relatives and nurses are the usual sources, but direct observation and common sense are also important. However direct testing is not needed. 5. Usually the patient's performance over the preceding 24-48 hours is important, but occasionally longer periods will be relevant. 6. Middle categories imply that the patient supplies over 50 per cent of the effort. 7. Use of aids to be independent is allowed. Sana Becker., Barthel, D.W. (3357).  Functional evaluation: the Barthel Index. [address] [facility] (14)2. MACI Arrieta, Tracey Arce., Jamaal Cruz, Pallavi, [address] (1999). Measuring the change indisability after inpatient rehabilitation; comparison of the responsiveness of the Barthel Index and Functional [place] Measure. Journal of Neurology, Neurosurgery, and Psychiatry, 66(4), 629-206. MYLENE Martinez, RAFAEL Hart, & Angel Hill M.A. (2004.) Assessment of post-stroke quality of life in cost-effectiveness studies: The usefulness of the Barthel Index and the EuroQoL-5D. Quality of Life Research, 15, 451-17          Physical Therapy Evaluation Charge Determination   History Examination Presentation Decision-Making   LOW Complexity : Zero comorbidities / personal factors that will impact the outcome / POC LOW Complexity : 1-2 Standardized tests and measures addressing body structure, function, activity limitation and / or participation in recreation  LOW Complexity : Stable, uncomplicated  LOW Complexity : FOTO score of       Based on the above components, the patient evaluation is determined to be of the following complexity level: LOW     Pain:  Pain Scale 1: Numeric (0 - 10)  Pain Intensity 1: 0  Pain Location 1: Hip  Pain Orientation 1: Right  Pain Description 1: Constant     Activity Tolerance:   Good  Please refer to the flowsheet for vital signs taken during this treatment. After treatment:   []         Patient left in no apparent distress sitting up in chair  [x]         Patient left in no apparent distress in bed  [x]         Call bell left within reach  [x]         Nursing notified  []         Caregiver present  []         Bed alarm activated     COMMUNICATION/EDUCATION:   The patients plan of care was discussed with: Registered Nurse. [x]         Fall prevention education was provided and the patient/caregiver indicated understanding. [x]         Patient/family have participated as able in goal setting and plan of care.   [x] Patient/family agree to work toward stated goals and plan of care. []         Patient understands intent and goals of therapy, but is neutral about his/her participation. []         Patient is unable to participate in goal setting and plan of care.     Thank you for this referral.  Amelia Mckeon, PT   Time Calculation: 44 mins

## 2019-02-22 NOTE — PROGRESS NOTES
Occupational Therapy Goals  Initiated 2/22/2019  1. Patient will perform grooming standing at sink, maintaining RLE TTWB, with supervision/set-up within 7 day(s). 2.  Patient will perform sponge bathing, adhering to R hip and TTWB precautions, with supervision/set-up within 7 day(s). 3.  Patient will perform lower body dressing, adhering to R hip and TTWB precautions, with supervision/set-up within 7 day(s). 4.  Patient will perform toilet transfers, maintaining RLE TTWB precautions, from [facility] over toilet, with supervision/set-up within 7 day(s). 5.  Patient will perform all aspects of toileting, adhering to TTWB precautions when standing, with supervision/set-up within 7 day(s). Occupational Therapy EVALUATION  Patient: Josefa Yap (22 y.o. male)  Date: 2/22/2019  Primary Diagnosis: Atrial fibrillation ([facility] [address] [I48.91]  Periprosthetic fracture around internal prosthetic hip joint [M97. 8XXA, Z96.649]  A-fib ([facility] [address] [I48.91]  Procedure(s) (LRB):  REVISION OF PERIPROSTHETIC FRACTURE (Right) 1 Day Post-Op   Precautions:TTWB, Total hip(Anterior and Posterior. )    ASSESSMENT :  Based on the objective data described below, the patient presents with post op RLE TTWB and STARLA precautions, as well as mild GW, decreased activity tolerance and decreased balance which is impairing his functional independence. Patient is functioning below his independent baseline, now performing ADLs at an independent to max A level and is supervision to min A for functional mobility. Patient fairly consistent in regards to adhering to his precautions during functional mobility. At this time he will benefit from skilled intervention to address the above impairments.   Patients rehabilitation potential is considered to be Good  Factors which may influence rehabilitation potential include:   [x]             None noted  []             Mental ability/status  []             Medical condition  []             Home/family situation and support systems  []             Safety awareness  []             Pain tolerance/management  []             Other:      PLAN :  Recommendations and Planned Interventions:  [x]               Self Care Training                  []        Therapeutic Activities  [x]               Functional Mobility Training    []        Cognitive Retraining  []               Therapeutic Exercises           [x]        Endurance Activities  [x]               Balance Training                   []        Neuromuscular Re-Education  []               Visual/Perceptual Training     [x]   Home Safety Training  [x]               Patient Education                 [x]        Family Training/Education  []               Other (comment):    Frequency/Duration: Patient will be followed by occupational therapy 5 times a week to address goals. Discharge Recommendations: Home Health OT and PT, with assistance of family PRN. Further Equipment Recommendations for Discharge: Has BSC and LB AE(hip kit)         OBJECTIVE DATA SUMMARY:     Past Medical History:   Diagnosis Date    Arthritis     Atrial fibrillation ([facility] [address]     CAD (coronary artery disease)     lipids    Chronic obstructive pulmonary disease (HCC)     Chronic pain     Diabetes ([facility] [address]     Hypertension     Infectious disease      Past Surgical History:   Procedure Laterality Date    HX HIP REPLACEMENT Right 02/11/2019    Acetabular bone graft    HX TONSILLECTOMY       Prior Level of Function/Home Situation: Independent, prior to recent R anterior STARLA. He has been home since his STARLA receiving HHPT. Patient reports recently performing LB ADLs using AE. Expanded or extensive additional review of patient history:     Home Situation  Home Environment: Independent living  # Steps to Enter:  1(landing at front)  One/Two Story Residence: One story  Living Alone: No  Support Systems: Family member(s)  Patient Expects to be Discharged to[de-identified] Private residence  Current DME Used/Available at Home: Commode, bedside  Tub or Shower Type: Shower  [x]  Right hand dominant   []  Left hand dominant  Cognitive/Behavioral Status:  Neurologic State: Alert  Orientation Level: Oriented X4  Cognition: Appropriate decision making; Appropriate for age attention/concentration; Appropriate safety awareness; Follows commands             Vision/Perceptual:         Acuity: Within Defined Limits       Range of Motion:  AROM: Generally decreased, functional  PROM: (RLE decreased but functional)  Strength:  Strength: Generally decreased, functional  Coordination:  Coordination: Within functional limits  Fine Motor Skills-Upper: Left Intact; Right Intact    Gross Motor Skills-Upper: Left Intact; Right Intact  Tone & Sensation:  Tone: Normal  Balance:  Sitting: Intact  Standing: Impaired(with support of RW)  Standing - Static: Good  Standing - Dynamic : Fair  Functional Mobility and Transfers for ADLs:  Bed Mobility:  Rolling: Supervision  Supine to Sit: Minimum assistance(for RLE)  Sit to Supine: Supervision  Scooting: Minimum assistance(for RLE)  Transfers:  Sit to Stand: Minimum assistance  Stand Pivot Transfers: Stand-by assistance  Bed to Chair: Contact guard assistance(taking hopping steps with RW, TTWB RLE)          Toilet Transfer : Minimum assistance(from [facility])           Bathroom Mobility: Contact guard assistance(taking hopping steps with RW, TTWB RLE)  ADL Assessment:  Feeding: Independent    Oral Facial Hygiene/Grooming: Contact guard assistance    Bathing:  Moderate assistance    Upper Body Dressing: Supervision;Setup    Lower Body Dressing: Maximum assistance    Toileting: Minimum assistance                Functional Measure:  Barthel Index:    Bathin  Bladder: 10  Bowels: 10  Groomin  Dressin  Feeding: 10  Mobility: 0  Stairs: 0  Toilet Use: 5  Transfer (Bed to Chair and Back): 10  Total: 50       Barthel and G-code impairment scale:  Percentage of impairment CH  0% CI  1-19% CJ  20-39% CK  40-59% CL  60-79% CM  80-99% CN  100%   Barthel Score 0-100 100 99-80 79-60 59-40 20-39 1-19   0   Barthel Score 0-20 20 17-19 13-16 9-12 5-8 1-4 0      The Barthel ADL Index: Guidelines  1. The index should be used as a record of what a patient does, not as a record of what a patient could do. 2. The main aim is to establish degree of independence from any help, physical or verbal, however minor and for whatever reason. 3. The need for supervision renders the patient not independent. 4. A patient's performance should be established using the best available evidence. Asking the patient, friends/relatives and nurses are the usual sources, but direct observation and common sense are also important. However direct testing is not needed. 5. Usually the patient's performance over the preceding 24-48 hours is important, but occasionally longer periods will be relevant. 6. Middle categories imply that the patient supplies over 50 per cent of the effort. 7. Use of aids to be independent is allowed. Santosh Huerta., Barthel, D.W. (3916). Functional evaluation: the Barthel Index. [facility] (14)2. Ree Wynne juan jose MACI Orta, Tracey Arce., Jamaal Cruz, Pallavi, [address] (1999). Measuring the change indisability after inpatient rehabilitation; comparison of the responsiveness of the Barthel Index and Functional [place] Measure. Journal of Neurology, Neurosurgery, and Psychiatry, 66(4), 931-071. Simin Steven, N.VIVEK.SHANNON, RAFAEL Hart, & [place] MMariaaA. (2004.) Assessment of post-stroke quality of life in cost-effectiveness studies: The usefulness of the Barthel Index and the EuroQoL-5D. Quality of Life Research, 13, 427-43       ADL Intervention and task modifications:  Initiated bed mobility, transfer, toileting, standing grooming and safety training. Standing grooming and dressing training provided via demonstration and verbal explanation of adaptive techniques to ensure adherence to STARLA and TTWB precautions. Pain:  Pain Scale 1: Numeric (0 - 10)  Pain Intensity 1: 0              Activity Tolerance:   VSS    After treatment:   [x] Patient left in no apparent distress sitting up in chair  [] Patient left in no apparent distress in bed  [x] Call bell left within reach  [x] Nursing notified  [] Caregiver present  [x] Bed alarm activated    COMMUNICATION/EDUCATION:   The patients plan of care was discussed with: Nursing and PT  [x] Home safety education was provided and the patient/caregiver indicated understanding. [x] Patient/family have participated as able in goal setting and plan of care. [x] Patient/family agree to work toward stated goals and plan of care. [] Patient understands intent and goals of therapy, but is neutral about his/her participation. [] Patient is unable to participate in goal setting and plan of care. This patients plan of care is appropriate for delegation to [facility].     Thank you for this referral.  Neel Bullard, OTR/L  Time Calculation: 25 mins

## 2019-02-22 NOTE — PROGRESS NOTES
Hospitalist Progress Note    NAME: Natasha Junior   :  1955   MRN:  [id number]       Assessment / Plan:  Afib RVR new onset with hypotension. Now resolved. Return to NSR. BP is stable  -on amiodarone GTT  -resume home BP meds as tolerated  -cardio on board    Acute on chronic anemia. No gross signs of blood loss at this time. Hgb down to 6.1 upon admission. S/p 1u pRBC  -trend H&H  -transfuse if Hgb <7 or symptomatic  -iron panel is unremarkable  -fobt pending     Hyponatremia / JUDAH. Likely prerenal in origin. Baseline Cr 1.1, admission 1.84. Cr back to baseline  -Monitor cr/electrolytes/fluid status closely.      R hip prosthetic fx s/p repair   -s/p  R STARLA by Dr Maria Fernanda Cordoba which was broken during PT   -post op care per ortho  -pain management with bowel regimen      DM type II  -holding metformin  -c/w SS insulin     Tobacco use  -pt was educated on smoking cessation  -nicotine patch      Hyperlipidemia, cont statin    GERD, cont PPI       Code Status: Full code     DVT Prophylaxis: scds     Subjective:     Chief Complaint / Reason for Physician Visit: AF    Tolerated surgery well yesterday. Pain is tolerable. Otherwise no acute complaints. In NSR    Review of Systems: 14 pt ROS unremarkable except as stated above. Objective:     VITALS:   Last 24hrs VS reviewed since prior progress note.  Most recent are:  Patient Vitals for the past 24 hrs:   Temp Pulse Resp BP SpO2   19 1111 99.2 °F (37.3 °C) 75 18 139/73 97 %   19 0800 98.2 °F (36.8 °C) 73 17 138/57 99 %   19 0430 98 °F (36.7 °C) 69 16 141/65 97 %   19 2250 98.6 °F (37 °C) 64 16 135/64 100 %   19 2000 98 °F (36.7 °C) 67 16 137/78 100 %   19 1600 97.4 °F (36.3 °C) 67 12 141/72 100 %   19 1355 -- (!) 51 8 -- 99 %   19 1350 -- (!) 55 16 -- 97 %   19 1345 97.5 °F (36.4 °C) 64 13 123/58 94 %   19 1340 -- (!) 56 15 -- 98 %   19 1330 -- 64 21 124/60 95 %   19 1315 -- 63 17 113/61 95 %   02/21/19 1300 -- (!) 54 17 109/57 97 %   02/21/19 1255 -- (!) 58 16 117/59 97 %   02/21/19 1250 -- (!) 55 17 104/50 95 %   02/21/19 1245 97.7 °F (36.5 °C) 62 14 107/50 99 %   02/21/19 1240 -- 64 12 108/50 91 %       Intake/Output Summary (Last 24 hours) at 2/22/2019 1236  Last data filed at 2/22/2019 1111  Gross per 24 hour   Intake 3535.67 ml   Output 3950 ml   Net -414.33 ml        PHYSICAL EXAM:  General:    Alert, cooperative, no distress, appears stated age. HEENT: Atraumatic, anicteric sclerae  Neck:  Supple, symmetrical  Lungs:   Clear to auscultation bilaterally. No Wheezing or Rhonchi. No rales. No Accessory muscle use. Heart:   RRR,  Normal S1 and S2   No edema  Abdomen:   Soft, non-tender. Not distended. Bowel sounds normal.  No rebound  Extremities: No cyanosis. No clubbing  Skin:     Warm, dry   Neurologic: No gross focal neuro deficit      Reviewed most current lab test results and cultures  YES  Reviewed most current radiology test results   YES  Review and summation of old records today    NO  Reviewed patient's current orders and MAR    YES  PMH/SH reviewed - no change compared to H&P    ________________________________________________________________________  Patel Suggs MD     Procedures: see electronic medical records for all procedures/Xrays and details which were not copied into this note but were reviewed prior to creation of Plan. LABS:  I reviewed today's most current labs and imaging studies.   Pertinent labs include:  Recent Labs     02/22/19  0153 02/21/19  0322 02/20/19  1156 02/20/19  0211   WBC  --  6.1  --  7.2   HGB 7.9* 7.8* 8.3* 6.1*   HCT  --  23.4* 25.3* 18.8*   PLT  --  238  --  244     Recent Labs     02/22/19  0153 02/20/19  0211    137   K 4.2 5.1    106   CO2 23 22   GLU 96 79   BUN 16 37*   CREA 1.03 1.37*   CA 7.2* 7.8*   MG  --  1.8   PHOS  --  3.3       Signed: Patel Suggs MD

## 2019-02-22 NOTE — PROGRESS NOTES
[address] [place] [address]  [phone]      Cardiology Progress Note      2/22/2019 11:00AM    Admit Date: 2/19/2019    Admit Diagnosis:   Atrial fibrillation ([facility] [address] [I48.91]  Periprosthetic fracture around internal prosthetic hip joint [M97. 8XXA, Z96.649]  A-fib ([address] [I48.91]    Subjective:     Judit Clark is s/p hip repair yesterday. He has no complaints of SOB, CP. Remains in SR on IV amio. Will change to PO amiodarone today.      Visit Vitals  /73 (BP 1 Location: Left arm, BP Patient Position: At rest)   Pulse 75   Temp 99.2 °F (37.3 °C)   Resp 18   Ht 5' 6\" (1.676 m)   Wt 91 kg (200 lb 9.9 oz)   SpO2 97%   BMI 32.38 kg/m²       Current Facility-Administered Medications   Medication Dose Route Frequency    [START ON 2/23/2019] amiodarone (CORDARONE) tablet 200 mg  200 mg Oral DAILY    0.9% sodium chloride infusion 250 mL  250 mL IntraVENous PRN    sodium chloride (NS) flush 5-40 mL  5-40 mL IntraVENous Q8H    sodium chloride (NS) flush 5-40 mL  5-40 mL IntraVENous PRN    acetaminophen (TYLENOL) tablet 650 mg  650 mg Oral Q6H    naloxone (NARCAN) injection 0.4 mg  0.4 mg IntraVENous PRN    ondansetron (ZOFRAN ODT) tablet 4 mg  4 mg Oral Q4H PRN    senna-docusate (PERICOLACE) 8.6-50 mg per tablet 1 Tab  1 Tab Oral BID    polyethylene glycol (MIRALAX) packet 17 g  17 g Oral DAILY    [START ON 2/23/2019] bisacodyl (DULCOLAX) suppository 10 mg  10 mg Rectal DAILY PRN    magnesium hydroxide (MILK OF MAGNESIA) 400 mg/5 mL oral suspension 30 mL  30 mL Oral DAILY PRN    oxyCODONE IR (ROXICODONE) tablet 10 mg  10 mg Oral Q3H PRN    oxyCODONE IR (ROXICODONE) tablet 5 mg  5 mg Oral Q3H PRN    morphine injection 1 mg  1 mg IntraVENous Q2H PRN    glucose chewable tablet 16 g  4 Tab Oral PRN    dextrose (D50W) injection syrg 12.5-25 g  12.5-25 g IntraVENous PRN    glucagon (GLUCAGEN) injection 1 mg  1 mg IntraMUSCular PRN    aspirin chewable tablet 81 mg  81 mg Oral BID    atorvastatin (LIPITOR) tablet 20 mg  20 mg Oral DAILY    famotidine (PEPCID) tablet 20 mg  20 mg Oral ACB    gemfibrozil (LOPID) tablet 300 mg  300 mg Oral ACB&D    acetaminophen (TYLENOL) tablet 650 mg  650 mg Oral Q4H PRN    ondansetron (ZOFRAN) injection 4 mg  4 mg IntraVENous Q4H PRN    nicotine (NICODERM CQ) 21 mg/24 hr patch 1 Patch  1 Patch TransDERmal Q24H    bisacodyl (DULCOLAX) suppository 10 mg  10 mg Rectal DAILY PRN       Objective:      Physical Exam:  General Appearance:  WNWD  male in no acute distress  Chest:   Clear  Cardiovascular:  Regular rate and rhythm, no murmur.   Abdomen:   Soft, non-tender, bowel sounds are active.   Extremities: no peripheral edema  Skin:  Warm and dry.     Data Review:   Recent Labs     02/22/19  0153 02/21/19  0322 02/20/19  1156 02/20/19  0211   WBC  --  6.1  --  7.2   HGB 7.9* 7.8* 8.3* 6.1*   HCT  --  23.4* 25.3* 18.8*   PLT  --  238  --  244     Recent Labs     02/22/19  0153 02/20/19  0211    137   K 4.2 5.1    106   CO2 23 22   GLU 96 79   BUN 16 37*   CREA 1.03 1.37*   CA 7.2* 7.8*   MG  --  1.8   PHOS  --  3.3       No results for input(s): TROIQ, CPK, CKMB in the last 72 hours. Intake/Output Summary (Last 24 hours) at 2/22/2019 1406  Last data filed at 2/22/2019 1358  Gross per 24 hour   Intake 3245.67 ml   Output 3950 ml   Net -704.33 ml        Telemetry: SR      Assessment:     Active Problems:    Atrial fibrillation ([facility] [address] (2/19/2019)      Periprosthetic fracture around internal prosthetic hip joint (2/19/2019)      A-fib ([facility] [address] (2/19/2019)        Plan:     Afib with RVR:  Remains in SR, rate controlled on IV amiodarone. Change to PO 200mg amiodarone starting in AM.   New onset, or first documented as patient cannot feel and tell that he is in afib. BP much improved post surgery, PTA was on ACEI/HCTZ. Recommend start low dose diltiazem for BP and rhythm support.     CHADS2 vasc score: 2, lovenox discontinued due to anemia and significant drop in HGB. Not GIB, but anemia. Consider long term DOAC once HGB stabilizes post surgery. Need to consider whether this was a surgical blood loss or if there is any concern for GI bleed and if/ when can anticoagulate  Per conversation with Dr. Dayanna Wei, may likely be due to thigh hematoma. Echo preliminary report shows EF normal, RV dil.      Hypotension:  Resolved, recommend starting low dose Dilt      Anemia:  2 units PRBC transfused on 2/20/19. HGB stable, continue to monitor   Transfusing per [address] [facility]  Cardiology    Patient seen and examined by me with nurse practitioner Shalonda Brooks. I personally performed all components of the history, physical, and medical decision making and agree with the assessment and plan with minor modifications as noted. AF back in NSR. Will benefit from systemic AC with Eliquis when no ongoing bleeding and when felt to be OK with orthopedics. Left message with Felipe Zheng of ortho to discuss with attending. Still no FOBT to rule out GI blood loss. Will reorder.

## 2019-02-22 NOTE — PROGRESS NOTES
Pt refusing to get oob to chair for dinner, he states he will tomorrow, encouraged pt to lay in different positions to avoid bed soars

## 2019-02-22 NOTE — PROGRESS NOTES
Problem: Falls - Risk of  Goal: *Absence of Falls  Document Meghann Fall Risk and appropriate interventions in the flowsheet.   Outcome: Progressing Towards Goal  Fall Risk Interventions:            Medication Interventions: Bed/chair exit alarm, Patient to call before getting OOB    Elimination Interventions: Bed/chair exit alarm, Call light in reach, Toileting schedule/hourly rounds

## 2019-02-22 NOTE — PROGRESS NOTES
PCU SHIFT NURSING NOTE      Bedside shift change report given to Rajwinder [address] (oncoming nurse) by Irina Morales (offgoing nurse). Report included the following information SBAR, Kardex, Intake/Output, MAR and Recent Results. Shift Summary:   2463- Patient c/o pain during shift change. PRN pain medication administered as prescribed. See EMAR. Will continue to monitor. 0600- Patient had uneventful shift. PRN pain medication administered throughout night. Ice pack applied to right hip q4 hours and ordered. Patient resting comfortably. No needs identified. Will continue to monitor    Admission Date 2/19/2019   Admission Diagnosis Atrial fibrillation ([facility] [address] [I48.91]  Periprosthetic fracture around internal prosthetic hip joint [M97. 8XXA, Z96.649]  A-fib ([facility] [address] [I48.91]   Consults IP CONSULT TO ORTHOPEDIC SURGERY        Consults   []PT   []OT   []Speech   []Case Management      [] Palliative      Cardiac Monitoring Order   []Yes   []No     IV drips   []Yes    Drip:                            Dose:  Drip:                            Dose:  Drip:                            Dose:   []No     GI Prophylaxis   []Yes   []No         DVT Prophylaxis   SCDs:  Sequential Compression Device: Bilateral          Kalin stockings:         [] Medication   []Contraindicated   []None      Activity Level Activity Level: Bed Rest     Activity Assistance: Complete care   Purposeful Rounding every 1-2 hour? []Yes   Alegre Score  Total Score: 2   Bed Alarm (If score 3 or >)   []Yes   [] Refused (See signed refusal form in chart)   Osorio Score  Osorio Score: 18   Osorio Score (if score 14 or less)   []PMT consult   []Wound Care consult      []Specialty bed   [] Nutrition consult          Needs prior to discharge:   Home O2 required:    []Yes   []No    If yes, how much O2 required? Other:    Last Bowel Movement: Last Bowel Movement Date: 02/19/19      Influenza Vaccine Received Flu Vaccine for Current Season (usually Sept-March):  Yes Pneumonia Vaccine           Diet Active Orders   Diet    DIET CARDIAC Regular; 2-3 GM NA      LDAs               Peripheral IV 02/19/19 Right Antecubital (Active)   Site Assessment Clean, dry, & intact 2/21/2019  1:35 PM   Phlebitis Assessment 0 2/21/2019  1:35 PM   Infiltration Assessment 0 2/21/2019  1:35 PM   Dressing Status Clean, dry, & intact 2/21/2019  1:35 PM   Dressing Type Transparent;Tape 2/21/2019  1:35 PM   Hub Color/Line Status Infusing;Patent 2/21/2019  1:35 PM   Action Taken Other (comment) 2/21/2019  4:00 AM   Alcohol Cap Used Yes 2/21/2019  4:00 AM       Peripheral IV 02/20/19 Distal;Posterior;Right Forearm (Active)   Site Assessment Clean, dry, & intact 2/21/2019  1:35 PM   Phlebitis Assessment 0 2/21/2019  1:35 PM   Infiltration Assessment 0 2/21/2019  1:35 PM   Dressing Status Clean, dry, & intact 2/21/2019  1:35 PM   Dressing Type Transparent;Tape 2/21/2019  1:35 PM   Hub Color/Line Status Infusing;Patent 2/21/2019  1:35 PM   Action Taken Other (comment) 2/21/2019  4:00 AM   Alcohol Cap Used Yes 2/21/2019  4:00 AM                      Urinary Catheter      Intake & Output Date 02/20/19 1900 - 02/21/19 0659 02/21/19 0700 - 02/22/19 0659   Shift 2888-6686 24 Hour Total 7954-1424 6758-3989 24 Hour Total   INTAKE   P.O.  600        P. O.  600      I. V.(mL/kg/hr) 8017.2 2918 2150(2.2)  2150     I.V.   150  150     Amiodarone Volume 336.7 486. 3        Volume (0.9% sodium chloride infusion) 1331.7 2431.7        Volume (lactated Ringers infusion)   1500  1500     Volume (0.9% sodium chloride infusion)   500  500   Blood  476.3 310  310     Volume (TRANSFUSE PACKED RBC'S)  476.3        Volume (TRANSFUSE PACKED RBC'S)   310  310   Shift Total(mL/kg) 8120.2(05) 3994. 3(48) 7443(18.7)  1717(05.4)   OUTPUT   Urine(mL/kg/hr) 900 3250 2025(2)  2025     Urine Voided 900 3250 2025 2025     Urine Occurrence(s) 1 x 1 x 0 x  0 x   Emesis/NG output   0  0     Emesis   0  0     Emesis Occurrence(s)   0 x  0 x Other   0  0     Other Output   0  0   Stool   0  0     Stool Occurrence(s)   0 x  0 x     Stool   0  0   Blood   500  500     Estimated Blood Loss   500  500     Blood   0  0   Shift Total(mL/kg) 900(10.8) 3250(39) 2525(30.4)  2525(30.4)   .3 744.3 -65  -65   Weight (kg) 83.3 83.3 83 83 83         Readmission Risk Assessment Tool Score Low Risk            7       Total Score        3 Has Seen PCP in Last 6 Months (Yes=3, No=0)    4 IP Visits Last 12 Months (1-3=4, 4=9, >4=11)        Criteria that do not apply:    . Living with Significant Other. Assisted Living. LTAC. SNF. or   Rehab    Patient Length of Stay (>5 days = 3)    Pt.  Coverage (Medicare=5 , Medicaid, or Self-Pay=4)    Charlson Comorbidity Score (Age + Comorbid Conditions)       Expected Length of Stay 3d 9h   Actual Length of Stay 2

## 2019-02-22 NOTE — PROGRESS NOTES
Problem: Mobility Impaired (Adult and Pediatric)  Goal: *Acute Goals and Plan of Care (Insert Text)  Physical Therapy Goals  Initiated 2/22/2019    1. Patient will transfer from bed to chair and chair to bed with modified independence using the least restrictive device within 7 day(s). 2.  Patient will perform sit to stand with independence within 7 day(s). 3.  Patient will ambulate with modified independence for 300 feet with the least restrictive device within 7 day(s). 4.  Patient will be I with R hip HEP within 7 days   physical Therapy TREATMENT  Patient: Myesha Ross (64 y.o. male)  Date: 2/22/2019  Diagnosis: Atrial fibrillation ([facility] [address] [I48.91]  Periprosthetic fracture around internal prosthetic hip joint [M97. 8XXA, Z96.649]  A-fib ([facility] [address] [I48.91] <principal problem not specified>  Procedure(s) (LRB):  REVISION OF PERIPROSTHETIC FRACTURE (Right) 1 Day Post-Op  Precautions:    Chart, physical therapy assessment, plan of care and goals were reviewed. ASSESSMENT:  Pt cleared by RN for second PT session. Pt lying in bed agreeable to PT. Pt demonstrates bed mobility and sit<>stand at S. Good recall from am session regarding pushing up with BUE. Gait with RW x 180ft with good carryover of RLE TTWB from am session. Pt without report of undue pain. VSS. Pt returned to bed, declined sitting up in chair. Pt reporting a little fatigue. Progression toward goals:  [x]    Improving appropriately and progressing toward goals  []    Improving slowly and progressing toward goals  []    Not making progress toward goals and plan of care will be adjusted     PLAN:  Patient continues to benefit from skilled intervention to address the above impairments. Continue treatment per established plan of care. Discharge Recommendations:  Home Health  Further Equipment Recommendations for Discharge:  none     SUBJECTIVE:   Patient stated You're back again.     OBJECTIVE DATA SUMMARY:   Critical Behavior:  Neurologic State: Alert  Orientation Level: Oriented X4  Cognition: Follows commands     Functional Mobility Training:  Bed Mobility:  Rolling: Supervision  Supine to Sit: Supervision  Sit to Supine: Supervision  Scooting: Independent        Transfers:  Sit to Stand: Supervision  Stand to Sit: Supervision  Stand Pivot Transfers: Stand-by assistance                          Balance:  Sitting: Intact  Standing: Intact; With support  Ambulation/Gait Training:  Distance (ft): 180 Feet (ft)  Assistive Device: Walker, rolling;Gait belt  Ambulation - Level of Assistance: Stand-by assistance;Contact guard assistance        Gait Abnormalities: Decreased step clearance  Right Side Weight Bearing: Toe touch  Left Side Weight Bearing: Full  Base of Support: Widened  Stance: Right decreased  Speed/Rayne: Pace decreased (<100 feet/min)  Step Length: Left shortened;Right shortened         Pain:  Pain Scale 1: Numeric (0 - 10)   Pain:  3/10  Pain Location 1: Hip  Pain Orientation 1: Right  Pain Description 1: Constant     Activity Tolerance:   Good  Please refer to the flowsheet for vital signs taken during this treatment.   After treatment:   []    Patient left in no apparent distress sitting up in chair  [x]    Patient left in no apparent distress in bed  []    Call bell left within reach  [x]    Nursing notified  []    Caregiver present  []    Bed alarm activated    COMMUNICATION/COLLABORATION:   The patients plan of care was discussed with: Registered Nurse    Amy Robertson, PT   Time Calculation: 14 mins

## 2019-02-22 NOTE — PROGRESS NOTES
Ortho / Neurosurgery NP Note    POD# 1  s/p REVISION OF PERIPROSTHETIC FRACTURE   Pt seen with Dr. Kristopher Holden today    Pt resting in bed. No complaints. On tele - appears to be NSR and now off Amiodarone drip    VSS Afebrile. Voiding status: + void    Labs  Lab Results   Component Value Date/Time    HGB 7.9 (L) 02/22/2019 01:53 AM      Lab Results   Component Value Date/Time    INR 1.0 02/04/2019 09:29 AM        Body mass index is 32.38 kg/m². : A BMI > 30 is classified as obesity and > 40 is classified as morbid obesity. Opsite Dressings x 2  C.d.i  Mild expected post-op edema  Cryotherapy in place over incision  Calves soft and supple; No pain with passive stretch  Sensation and motor intact  SCDs for mechanical DVT proph while in bed     PLAN:  1) PT BID, posterior hip precautions. 2) Aspirin 81 mg PO BID for DVT Prophylaxis   3) GI Prophylaxis - Pepcid  4) Afib - cardiology managing - switching to PO Amiodarone today  5) Readniess for discharge:     [x] Vital Signs stable    [x] Hgb stable - s/p 1 unit pre-op transfusion   [x] + Voiding    [x] Wound intact, drainage minimal    [x] Tolerating PO intake     [] Cleared by PT (OT if applicable)     [] Stair training completed (if applicable)    [] Independent / Contact Guard Assist (household distance)     [] Bed mobility     [] Car transfers     [] ADLs    [x] Adequate pain control on oral medication alone - denies pain presently and aware to ask for pain medication. Will need to explore home vs rehab placement depending on PT progress.     Graham Vigil NP  DNP, ACNP-BC, ONP-C

## 2019-02-23 LAB
ERYTHROCYTE [DISTWIDTH] IN BLOOD BY AUTOMATED COUNT: 17.5 % (ref 11.5–14.5)
HCT VFR BLD AUTO: 24.9 % (ref 36.6–50.3)
HEMOCCULT STL QL: NEGATIVE
HGB BLD-MCNC: 8.4 G/DL (ref 12.1–17)
MCH RBC QN AUTO: 32.9 PG (ref 26–34)
MCHC RBC AUTO-ENTMCNC: 33.7 G/DL (ref 30–36.5)
MCV RBC AUTO: 97.6 FL (ref 80–99)
NRBC # BLD: 0.02 K/UL (ref 0–0.01)
NRBC BLD-RTO: 0.3 PER 100 WBC
PLATELET # BLD AUTO: 211 K/UL (ref 150–400)
PMV BLD AUTO: 10.1 FL (ref 8.9–12.9)
RBC # BLD AUTO: 2.55 M/UL (ref 4.1–5.7)
WBC # BLD AUTO: 7.9 K/UL (ref 4.1–11.1)

## 2019-02-23 PROCEDURE — 36415 COLL VENOUS BLD VENIPUNCTURE: CPT

## 2019-02-23 PROCEDURE — 82272 OCCULT BLD FECES 1-3 TESTS: CPT

## 2019-02-23 PROCEDURE — 74011250637 HC RX REV CODE- 250/637: Performed by: NURSE PRACTITIONER

## 2019-02-23 PROCEDURE — 74011250637 HC RX REV CODE- 250/637: Performed by: PHYSICIAN ASSISTANT

## 2019-02-23 PROCEDURE — 74011250637 HC RX REV CODE- 250/637: Performed by: HOSPITALIST

## 2019-02-23 PROCEDURE — 85027 COMPLETE CBC AUTOMATED: CPT

## 2019-02-23 PROCEDURE — 65660000000 HC RM CCU STEPDOWN

## 2019-02-23 PROCEDURE — 97530 THERAPEUTIC ACTIVITIES: CPT

## 2019-02-23 PROCEDURE — 97116 GAIT TRAINING THERAPY: CPT

## 2019-02-23 RX ADMIN — SENNOSIDES AND DOCUSATE SODIUM 1 TABLET: 8.6; 5 TABLET ORAL at 18:18

## 2019-02-23 RX ADMIN — GEMFIBROZIL 300 MG: 600 TABLET ORAL at 09:20

## 2019-02-23 RX ADMIN — ACETAMINOPHEN 650 MG: 325 TABLET ORAL at 14:21

## 2019-02-23 RX ADMIN — ATORVASTATIN CALCIUM 20 MG: 20 TABLET, FILM COATED ORAL at 09:20

## 2019-02-23 RX ADMIN — POLYETHYLENE GLYCOL 3350 17 G: 17 POWDER, FOR SOLUTION ORAL at 09:00

## 2019-02-23 RX ADMIN — OXYCODONE HYDROCHLORIDE 10 MG: 5 TABLET ORAL at 14:21

## 2019-02-23 RX ADMIN — ACETAMINOPHEN 650 MG: 325 TABLET ORAL at 23:14

## 2019-02-23 RX ADMIN — OXYCODONE HYDROCHLORIDE 10 MG: 5 TABLET ORAL at 09:20

## 2019-02-23 RX ADMIN — ASPIRIN 81 MG 81 MG: 81 TABLET ORAL at 09:20

## 2019-02-23 RX ADMIN — Medication 10 ML: at 21:19

## 2019-02-23 RX ADMIN — ACETAMINOPHEN 650 MG: 325 TABLET ORAL at 18:18

## 2019-02-23 RX ADMIN — ASPIRIN 81 MG 81 MG: 81 TABLET ORAL at 18:18

## 2019-02-23 RX ADMIN — GEMFIBROZIL 300 MG: 600 TABLET ORAL at 18:18

## 2019-02-23 RX ADMIN — ACETAMINOPHEN 650 MG: 325 TABLET ORAL at 05:23

## 2019-02-23 RX ADMIN — FAMOTIDINE 20 MG: 20 TABLET ORAL at 09:20

## 2019-02-23 RX ADMIN — AMIODARONE HYDROCHLORIDE 200 MG: 200 TABLET ORAL at 09:20

## 2019-02-23 RX ADMIN — SENNOSIDES AND DOCUSATE SODIUM 1 TABLET: 8.6; 5 TABLET ORAL at 09:20

## 2019-02-23 RX ADMIN — Medication 10 ML: at 05:24

## 2019-02-23 NOTE — PROGRESS NOTES
Problem: Pressure Injury - Risk of  Goal: *Prevention of pressure injury  Document Osorio Scale and appropriate interventions in the flowsheet.   Outcome: Progressing Towards Goal  Pressure Injury Interventions:  Sensory Interventions: Assess changes in LOC, Check visual cues for pain, Minimize linen layers, Monitor skin under medical devices         Activity Interventions: PT/OT evaluation    Mobility Interventions: Float heels, HOB 30 degrees or less, PT/OT evaluation    Nutrition Interventions: Document food/fluid/supplement intake    Friction and Shear Interventions: HOB 30 degrees or less, Minimize layers

## 2019-02-23 NOTE — PROGRESS NOTES
Hospitalist Progress Note    NAME: Zeeshan Lees   :  1955   MRN:  [id number]       Assessment / Plan:  Afib RVR new onset with hypotension. Now resolved. Return to NSR. BP is stable  -amiodarone GTT transitioned to po  -resume home BP meds as tolerated  -cardio on board    Acute on chronic anemia. No gross signs of blood loss at this time. Hgb down to 6.1 upon admission. S/p 1u pRBC. Hgb stable  -trend H&H  -transfuse if Hgb <7 or symptomatic  -iron panel is unremarkable  -FOBT neg     Hyponatremia / JUDAH. Likely prerenal in origin. Baseline Cr 1.1, admission 1.84. Cr back to baseline  -Monitor cr/electrolytes/fluid status closely.      R hip prosthetic fx s/p repair   -s/p  R STARLA by Dr Lázaro Melendez with prosthetic broken during PT   -post op care per ortho  -pain management with bowel regimen   -PT/OT     DM type II  -holding metformin  -c/w SS insulin     Tobacco use  -pt was educated on smoking cessation  -nicotine patch      Hyperlipidemia, cont statin    GERD, cont PPI       Code Status: Full code     DVT Prophylaxis: scds    Dispo: Planned for d/c on Mon with [facility]     Subjective:     Chief Complaint / Reason for Physician Visit: AF    Some mild hip pain at times. Otherwise no acute complaints. In NSR    Review of Systems: 14 pt ROS unremarkable except as stated above. Objective:     VITALS:   Last 24hrs VS reviewed since prior progress note.  Most recent are:  Patient Vitals for the past 24 hrs:   Temp Pulse Resp BP SpO2   19 0800 98.6 °F (37 °C) 64 16 127/60 96 %   19 0304 98.7 °F (37.1 °C) 65 16 134/66 99 %   19 2327 99.2 °F (37.3 °C) 66 13 148/64 98 %   19 2324 99.2 °F (37.3 °C) --  -- --   19 99.2 °F (37.3 °C) 77 15 132/48 100 %   19 1505 98.9 °F (37.2 °C) 84 18 155/71 97 %   19 1111 99.2 °F (37.3 °C) 75 18 139/73 97 %       Intake/Output Summary (Last 24 hours) at 2019 1107  Last data filed at 2019 0846  Gross per 24 hour   Intake 870 ml   Output 2685 ml   Net -1815 ml        PHYSICAL EXAM:  General:    Alert, cooperative, no distress, appears stated age. HEENT: Atraumatic, anicteric sclerae  Neck:  Supple, symmetrical  Lungs:   Clear to auscultation bilaterally. No Wheezing or Rhonchi. No rales. No Accessory muscle use. Heart:   RRR,  Normal S1 and S2   No edema  Abdomen:   Soft, non-tender. Not distended. Bowel sounds normal.  No rebound  Extremities: No cyanosis. No clubbing  Skin:     Warm, dry   Neurologic: No gross focal neuro deficit      Reviewed most current lab test results and cultures  YES  Reviewed most current radiology test results   YES  Review and summation of old records today    NO  Reviewed patient's current orders and MAR    YES  PMH/SH reviewed - no change compared to H&P    ________________________________________________________________________  Leslie Lr MD     Procedures: see electronic medical records for all procedures/Xrays and details which were not copied into this note but were reviewed prior to creation of Plan. LABS:  I reviewed today's most current labs and imaging studies.   Pertinent labs include:  Recent Labs     02/23/19  0251 02/22/19  0153 02/21/19  0322 02/20/19  1156   WBC 7.9  --  6.1  --    HGB 8.4* 7.9* 7.8* 8.3*   HCT 24.9*  --  23.4* 25.3*     --  238  --      Recent Labs     02/22/19  0153      K 4.2      CO2 23   GLU 96   BUN 16   CREA 1.03   CA 7.2*       Signed: Leslie Lr MD

## 2019-02-23 NOTE — PROGRESS NOTES
Problem: Mobility Impaired (Adult and Pediatric)  Goal: *Acute Goals and Plan of Care (Insert Text)  Physical Therapy Goals  Initiated 2/22/2019    1. Patient will transfer from bed to chair and chair to bed with modified independence using the least restrictive device within 7 day(s). 2.  Patient will perform sit to stand with independence within 7 day(s). 3.  Patient will ambulate with modified independence for 300 feet with the least restrictive device within 7 day(s). 4.  Patient will be I with R hip HEP within 7 days   physical Therapy TREATMENT  Patient: Moi Rebollar (58 y.o. male)  Date: 2/23/2019  Diagnosis: Atrial fibrillation ([facility] [address] [I48.91]  Periprosthetic fracture around internal prosthetic hip joint [M97. 8XXA, Z96.649]  A-fib ([facility] [address] [I48.91] <principal problem not specified>  Procedure(s) (LRB):  REVISION OF PERIPROSTHETIC FRACTURE (Right) 2 Days Post-Op  Precautions: TTWB, Total hip(Anterior and Posterior. )  Chart, physical therapy assessment, plan of care and goals were reviewed. ASSESSMENT:  Pt cleared and seen for PM session. Pt just about to eat lunch in bed but agreeable to amb and then sitting in chair for lunch. Pt amb with RW with SBA; walker re-adjusted for better height advantage for triceps to increase tolerance. Cues for maintaining TTWB with increased speed as pt becomes more comfortable with amb. Pt stating 1/3 THR prec this afternoon. Re-educated in all 3. Plan still for pt to return home with family assist and HH. Pt left in chair with lunch, call bell in reach, nurse aware. Progression toward goals:  [x]    Improving appropriately and progressing toward goals  []    Improving slowly and progressing toward goals  []    Not making progress toward goals and plan of care will be adjusted     PLAN:  Patient continues to benefit from skilled intervention to address the above impairments. Continue treatment per established plan of care.   Discharge Recommendations:  Home Health  Further Equipment Recommendations for Discharge:  Has RW / w/c per chart     SUBJECTIVE:   Patient stated I am ok.     OBJECTIVE DATA SUMMARY:   Critical Behavior:  Neurologic State: Alert  Orientation Level: Oriented X4  Cognition: Follows commands, Appropriate for age attention/concentration, Appropriate safety awareness     Functional Mobility Training:  Bed Mobility:     Supine to Sit: Minimum assistance(for RLE)              Transfers:  Sit to Stand: Stand-by assistance  Stand to Sit: Stand-by assistance                             Balance:  Sitting: Intact  Standing: Impaired  Standing - Static: Good  Standing - Dynamic : Good  Ambulation/Gait Training:  Distance (ft): 150 Feet (ft)  Assistive Device: Gait belt;Walker, rolling  Ambulation - Level of Assistance: Stand-by assistance        Gait Abnormalities: Decreased step clearance  Right Side Weight Bearing: Toe touch     Base of Support: Shift to left  Stance: Right decreased  Speed/Rayne: Pace decreased (<100 feet/min)  Step Length: Left shortened             Pain:  Pain Scale 1: Numeric (0 - 10)  Pain Intensity 1: 4  Pain Location 1: Hip  Pain Orientation 1: Right  Pain Description 1: Aching  Pain Intervention(s) 1: Medication (see MAR)  Activity Tolerance:   Good for session; no acute distress  Please refer to the flowsheet for vital signs taken during this treatment.   After treatment:   [x]    Patient left in no apparent distress sitting up in chair  []    Patient left in no apparent distress in bed  [x]    Call bell left within reach  [x]    Nursing notified  []    Caregiver present  []    Bed alarm activated    COMMUNICATION/COLLABORATION:   The patients plan of care was discussed with: Registered Nurse    Meli Garrido, PT   Time Calculation: 16 mins

## 2019-02-23 NOTE — PROGRESS NOTES
Orthopedic NP Progress Note  Post Op day: 2 Days Post-Op    February 23, 2019 8:49 AM     [address]    Attending Physician: Treatment Team: Attending Provider: Sandro Del Toro MD; Utilization Review: Marlen Hickey RN; Consulting Provider: Renu Horn MD; Consulting Provider: Haze Gottron, MD; Care Manager: Michael Gavin RN; Utilization Review: Teressa Silverman     Vital Signs:    Patient Vitals for the past 8 hrs:   BP Temp Pulse Resp SpO2 Weight   02/23/19 0800 127/60 98.6 °F (37 °C) 64 16 96 % --   02/23/19 0540 -- -- -- -- -- 85.4 kg (188 lb 4.4 oz)   02/23/19 0304 134/66 98.7 °F (37.1 °C) 65 16 99 % --     BMI (calculated): 30.4 (02/23/19 0540)    Intake/Output:  02/23 0701 - 02/23 1900  In: 360 [P.O.:360]  Out: 360 [Urine:360]  02/21 1901 - 02/23 0700  In: 3395.7 [P.O.:870; I.V.:2525.7]  Out: 4350 [Urine:4350]    Pain Control:   Pain Assessment  Pain Scale 1: Numeric (0 - 10)  Pain Intensity 1: 0  Pain Location 1: Hip  Pain Orientation 1: Right  Pain Description 1: Constant  Pain Intervention(s) 1: Position    LAB:    Recent Labs     02/23/19  0251   HCT 24.9*   HGB 8.4*     Lab Results   Component Value Date/Time    Sodium 136 02/22/2019 01:53 AM    Potassium 4.2 02/22/2019 01:53 AM    Chloride 105 02/22/2019 01:53 AM    CO2 23 02/22/2019 01:53 AM    Glucose 96 02/22/2019 01:53 AM    BUN 16 02/22/2019 01:53 AM    Creatinine 1.03 02/22/2019 01:53 AM    Calcium 7.2 (L) 02/22/2019 01:53 AM       Subjective:  Tanesha Valdivia is a 62 y.o. male s/p a  Procedure(s):  REVISION OF PERIPROSTHETIC FRACTURE   Procedure(s):  REVISION OF PERIPROSTHETIC FRACTURE. Tolerating diet. Pt up in chair and reports pain is well managed      Objective: General: alert, cooperative, no distress. Neuro/Vascular: CNS Intact. Sensation stable. Brisk cap refill, 2+ pulses UE/LE  Musculoskeletal:  +ROM UE/LE. Sal's sign negative in bilateral lower extremities.   Calves soft, supple, non-tender upon palpation or with passive stretch. Skin: Incision - clean, dry and intact. No significant erythema or swelling. Dressing: clean, dry, and intact    Bey - n  Drain - n       PT/OT:   Gait:  Gait  Base of Support: Widened  Speed/Rayne: Pace decreased (<100 feet/min)  Step Length: Left shortened, Right shortened  Stance: Right decreased  Gait Abnormalities: Decreased step clearance  Ambulation - Level of Assistance: Stand-by assistance, Contact guard assistance  Distance (ft): 180 Feet (ft)  Assistive Device: Walker, rolling, Gait belt                   Assessment:    s/p Procedure(s):  REVISION OF PERIPROSTHETIC FRACTURE    Active Problems:    Atrial fibrillation ([facility] [address] (2/19/2019)      Periprosthetic fracture around internal prosthetic hip joint (2/19/2019)      A-fib ([facility] [address] (2/19/2019)         Plan:     -  Continue PT/OT  -  Continue established methods of pain control  -  VTE Prophylaxes - TEDS &/or SCDs with ASA 81 mg BID  -  GI Prophylaxes - pepcid        Discharge To:   Home with [place] when cleared       Signed By: Angela Fay NP    Orthopedic Nurse Practitioner

## 2019-02-23 NOTE — PROGRESS NOTES
Problem: Mobility Impaired (Adult and Pediatric)  Goal: *Acute Goals and Plan of Care (Insert Text)  Physical Therapy Goals  Initiated 2/22/2019    1. Patient will transfer from bed to chair and chair to bed with modified independence using the least restrictive device within 7 day(s). 2.  Patient will perform sit to stand with independence within 7 day(s). 3.  Patient will ambulate with modified independence for 300 feet with the least restrictive device within 7 day(s). 4.  Patient will be I with R hip HEP within 7 days   physical Therapy TREATMENT  Patient: Pravin Sibley (10 y.o. male)  Date: 2/23/2019  Diagnosis: Atrial fibrillation ([facility] [address] [I48.91]  Periprosthetic fracture around internal prosthetic hip joint [M97. 8XXA, Z96.649]  A-fib ([facility] [address] [I48.91] <principal problem not specified>  Procedure(s) (LRB):  REVISION OF PERIPROSTHETIC FRACTURE (Right) 2 Days Post-Op  Precautions: TTWB, Total hip(Anterior and Posterior. )  Chart, physical therapy assessment, plan of care and goals were reviewed. ASSESSMENT:  Pt progressing well with therapy. Reinforced TTWB status and pt verbalizes understanding, however has greater difficulty when fatigued at end of amb. He was able to state hip precautions but had questions in regard to bed mobility, reviewed. Pt able to complete bed mob with min A for support of RLE. He transfers to stand with CGA. He amb with SBA with occasional cues for maintaining WB status suly with fatigue. He returned to bedside chair with call bell in reach and LEs elevated. Call bell in reach and nurse aware. Pt should do well with assist of family upon d/c with HHPT/OT to follow for continued training.   Progression toward goals:  [x]    Improving appropriately and progressing toward goals  []    Improving slowly and progressing toward goals  []    Not making progress toward goals and plan of care will be adjusted     PLAN:  Patient continues to benefit from skilled intervention to address the above impairments. Continue treatment per established plan of care. Discharge Recommendations:  Home Health  Further Equipment Recommendations for Discharge:  Has w/c and RW     SUBJECTIVE:   Patient stated I get tired walking.     OBJECTIVE DATA SUMMARY:   Critical Behavior:  Neurologic State: Alert  Orientation Level: Oriented X4  Cognition: Follows commands, Appropriate for age attention/concentration, Appropriate safety awareness     Functional Mobility Training:  Bed Mobility:     Supine to Sit: Minimum assistance(for RLE)              Transfers:  Sit to Stand: Contact guard assistance  Stand to Sit: Contact guard assistance                             Balance:  Sitting: Intact  Standing: Impaired  Standing - Static: Good  Standing - Dynamic : Fair  Ambulation/Gait Training:  Distance (ft): 180 Feet (ft)  Assistive Device: Walker, rolling  Ambulation - Level of Assistance: Stand-by assistance        Gait Abnormalities: Decreased step clearance  Right Side Weight Bearing: Toe touch     Base of Support: Shift to left  Stance: Right decreased  Speed/Rayne: Pace decreased (<100 feet/min)  Step Length: Left shortened          Pain:  Pain Scale 1: Numeric (0 - 10)  Pain Intensity 1: 4  Pain Location 1: Hip  Pain Orientation 1: Right  Pain Description 1: Aching  Pain Intervention(s) 1: Medication (see MAR)  Activity Tolerance:   Good for session; no acute distress  Please refer to the flowsheet for vital signs taken during this treatment.   After treatment:   [x]    Patient left in no apparent distress sitting up in chair  []    Patient left in no apparent distress in bed  [x]    Call bell left within reach  [x]    Nursing notified  []    Caregiver present  [x]    Bed alarm activated    COMMUNICATION/COLLABORATION:   The patients plan of care was discussed with: Registered Nurse    Yi Garrido, PT   Time Calculation: 23 mins

## 2019-02-23 NOTE — PROGRESS NOTES
2/23/2019 8:52 AM    Admit Date: 2/19/2019    Admit Diagnosis: Atrial fibrillation ([facility] [I48.91]; Periprosthetic fracture around internal prosthetic hip joint [M97. Evelyn Tan, [address]; A-fib ([facility] [I48.91]    Subjective:     Patricia Glover   denies chest pain, chest pressure/discomfort, dyspnea, palpitations, irregular heart beats, near-syncope, syncope, fatigue, orthopnea, paroxysmal nocturnal dyspnea, tachypnea.     Visit Vitals  /60   Pulse 64   Temp 98.6 °F (37 °C)   Resp 16   Ht 5' 6\" (1.676 m)   Wt 188 lb 4.4 oz (85.4 kg)   SpO2 96%   BMI 30.39 kg/m²     Current Facility-Administered Medications   Medication Dose Route Frequency    amiodarone (CORDARONE) tablet 200 mg  200 mg Oral DAILY    0.9% sodium chloride infusion 250 mL  250 mL IntraVENous PRN    sodium chloride (NS) flush 5-40 mL  5-40 mL IntraVENous Q8H    sodium chloride (NS) flush 5-40 mL  5-40 mL IntraVENous PRN    acetaminophen (TYLENOL) tablet 650 mg  650 mg Oral Q6H    naloxone (NARCAN) injection 0.4 mg  0.4 mg IntraVENous PRN    ondansetron (ZOFRAN ODT) tablet 4 mg  4 mg Oral Q4H PRN    senna-docusate (PERICOLACE) 8.6-50 mg per tablet 1 Tab  1 Tab Oral BID    polyethylene glycol (MIRALAX) packet 17 g  17 g Oral DAILY    bisacodyl (DULCOLAX) suppository 10 mg  10 mg Rectal DAILY PRN    magnesium hydroxide (MILK OF MAGNESIA) 400 mg/5 mL oral suspension 30 mL  30 mL Oral DAILY PRN    oxyCODONE IR (ROXICODONE) tablet 10 mg  10 mg Oral Q3H PRN    oxyCODONE IR (ROXICODONE) tablet 5 mg  5 mg Oral Q3H PRN    morphine injection 1 mg  1 mg IntraVENous Q2H PRN    glucose chewable tablet 16 g  4 Tab Oral PRN    dextrose (D50W) injection syrg 12.5-25 g  12.5-25 g IntraVENous PRN    glucagon (GLUCAGEN) injection 1 mg  1 mg IntraMUSCular PRN    aspirin chewable tablet 81 mg  81 mg Oral BID    atorvastatin (LIPITOR) tablet 20 mg  20 mg Oral DAILY    famotidine (PEPCID) tablet 20 mg  20 mg Oral ACB    gemfibrozil (LOPID) tablet 300 mg  300 mg Oral ACB&D    acetaminophen (TYLENOL) tablet 650 mg  650 mg Oral Q4H PRN    ondansetron (ZOFRAN) injection 4 mg  4 mg IntraVENous Q4H PRN    bisacodyl (DULCOLAX) suppository 10 mg  10 mg Rectal DAILY PRN         Objective:      Visit Vitals  /60   Pulse 64   Temp 98.6 °F (37 °C)   Resp 16   Ht 5' 6\" (1.676 m)   Wt 188 lb 4.4 oz (85.4 kg)   SpO2 96%   BMI 30.39 kg/m²       Physical Exam:  Abdomen: soft, non-tender. Bowel sounds normal.   Extremities: no cyanosis or edema  Heart: regular rate and rhythm, S1, S2 normal, no murmur, click, rub or gallop  Lungs: clear to auscultation bilaterally  Neurologic: Grossly normal    Data Review:   Labs:    Recent Results (from the past 24 hour(s))   GLUCOSE, POC    Collection Time: 02/22/19  8:36 PM   Result Value Ref Range    Glucose (POC) 182 (H) 65 - 100 mg/dL    Performed by Enoch Oleary (PCT)    CBC W/O DIFF    Collection Time: 02/23/19  2:51 AM   Result Value Ref Range    WBC 7.9 4.1 - 11.1 K/uL    RBC 2.55 (L) 4.10 - 5.70 M/uL    HGB 8.4 (L) 12.1 - 17.0 g/dL    HCT 24.9 (L) 36.6 - 50.3 %    MCV 97.6 80.0 - 99.0 FL    MCH 32.9 26.0 - 34.0 PG    MCHC 33.7 30.0 - 36.5 g/dL    RDW 17.5 (H) 11.5 - 14.5 %    PLATELET 555 281 - 103 K/uL    MPV 10.1 8.9 - 12.9 FL    NRBC 0.3 (H) 0  WBC    ABSOLUTE NRBC 0.02 (H) 0.00 - 0.01 K/uL       Telemetry: normal sinus rhythm      Assessment:     Active Problems:    Atrial fibrillation ([facility] [address] (2/19/2019)      Periprosthetic fracture around internal prosthetic hip joint (2/19/2019)      A-fib ([facility] [address] (2/19/2019)        Plan:     Remains in SR. Resume DOAC once clear by ortho as discussed by Dr. Yissel Spear. Continue other meds.

## 2019-02-24 LAB
ERYTHROCYTE [DISTWIDTH] IN BLOOD BY AUTOMATED COUNT: 17.2 % (ref 11.5–14.5)
GLUCOSE BLD STRIP.AUTO-MCNC: 122 MG/DL (ref 65–100)
HCT VFR BLD AUTO: 24.1 % (ref 36.6–50.3)
HGB BLD-MCNC: 8.1 G/DL (ref 12.1–17)
MCH RBC QN AUTO: 33.1 PG (ref 26–34)
MCHC RBC AUTO-ENTMCNC: 33.6 G/DL (ref 30–36.5)
MCV RBC AUTO: 98.4 FL (ref 80–99)
NRBC # BLD: 0 K/UL (ref 0–0.01)
NRBC BLD-RTO: 0 PER 100 WBC
PLATELET # BLD AUTO: 235 K/UL (ref 150–400)
PMV BLD AUTO: 10.2 FL (ref 8.9–12.9)
RBC # BLD AUTO: 2.45 M/UL (ref 4.1–5.7)
SERVICE CMNT-IMP: ABNORMAL
WBC # BLD AUTO: 7.9 K/UL (ref 4.1–11.1)

## 2019-02-24 PROCEDURE — 85027 COMPLETE CBC AUTOMATED: CPT

## 2019-02-24 PROCEDURE — 74011250637 HC RX REV CODE- 250/637: Performed by: PHYSICIAN ASSISTANT

## 2019-02-24 PROCEDURE — 97110 THERAPEUTIC EXERCISES: CPT

## 2019-02-24 PROCEDURE — 97116 GAIT TRAINING THERAPY: CPT

## 2019-02-24 PROCEDURE — 74011250637 HC RX REV CODE- 250/637: Performed by: HOSPITALIST

## 2019-02-24 PROCEDURE — 82962 GLUCOSE BLOOD TEST: CPT

## 2019-02-24 PROCEDURE — 65660000000 HC RM CCU STEPDOWN

## 2019-02-24 PROCEDURE — 36415 COLL VENOUS BLD VENIPUNCTURE: CPT

## 2019-02-24 PROCEDURE — 74011250637 HC RX REV CODE- 250/637: Performed by: NURSE PRACTITIONER

## 2019-02-24 RX ORDER — OXYCODONE HYDROCHLORIDE 5 MG/1
5 TABLET ORAL
Qty: 60 TAB | Refills: 0 | Status: SHIPPED | OUTPATIENT
Start: 2019-02-24 | End: 2020-10-08

## 2019-02-24 RX ORDER — GUAIFENESIN 100 MG/5ML
81 LIQUID (ML) ORAL 2 TIMES DAILY
Qty: 60 TAB | Refills: 0 | Status: SHIPPED | OUTPATIENT
Start: 2019-02-24 | End: 2019-02-27 | Stop reason: SDUPTHER

## 2019-02-24 RX ORDER — FAMOTIDINE 20 MG/1
20 TABLET, FILM COATED ORAL
Qty: 60 TAB | Refills: 0 | Status: SHIPPED | OUTPATIENT
Start: 2019-02-25 | End: 2019-02-27 | Stop reason: SDUPTHER

## 2019-02-24 RX ADMIN — ASPIRIN 81 MG 81 MG: 81 TABLET ORAL at 17:12

## 2019-02-24 RX ADMIN — Medication 10 ML: at 21:34

## 2019-02-24 RX ADMIN — ACETAMINOPHEN 650 MG: 325 TABLET ORAL at 12:07

## 2019-02-24 RX ADMIN — AMIODARONE HYDROCHLORIDE 200 MG: 200 TABLET ORAL at 08:45

## 2019-02-24 RX ADMIN — ACETAMINOPHEN 650 MG: 325 TABLET ORAL at 23:31

## 2019-02-24 RX ADMIN — Medication 10 ML: at 05:48

## 2019-02-24 RX ADMIN — FAMOTIDINE 20 MG: 20 TABLET ORAL at 08:45

## 2019-02-24 RX ADMIN — ACETAMINOPHEN 650 MG: 325 TABLET ORAL at 17:12

## 2019-02-24 RX ADMIN — ACETAMINOPHEN 650 MG: 325 TABLET ORAL at 05:47

## 2019-02-24 RX ADMIN — ASPIRIN 81 MG 81 MG: 81 TABLET ORAL at 08:45

## 2019-02-24 RX ADMIN — GEMFIBROZIL 300 MG: 600 TABLET ORAL at 17:12

## 2019-02-24 RX ADMIN — SENNOSIDES AND DOCUSATE SODIUM 1 TABLET: 8.6; 5 TABLET ORAL at 08:45

## 2019-02-24 RX ADMIN — GEMFIBROZIL 300 MG: 600 TABLET ORAL at 08:45

## 2019-02-24 RX ADMIN — POLYETHYLENE GLYCOL 3350 17 G: 17 POWDER, FOR SOLUTION ORAL at 08:45

## 2019-02-24 RX ADMIN — ATORVASTATIN CALCIUM 20 MG: 20 TABLET, FILM COATED ORAL at 08:45

## 2019-02-24 NOTE — PROGRESS NOTES
Problem: Falls - Risk of  Goal: *Absence of Falls  Document Meghann Fall Risk and appropriate interventions in the flowsheet.   Outcome: Progressing Towards Goal  Fall Risk Interventions:  Mobility Interventions: Bed/chair exit alarm         Medication Interventions: Assess postural VS orthostatic hypotension, Bed/chair exit alarm    Elimination Interventions: Call light in reach, Bed/chair exit alarm

## 2019-02-24 NOTE — PROGRESS NOTES
Bedside shift change report given to Jerzy Maddox (oncoming nurse) by Afia Russ (offgoing nurse). Report included the following information SBAR, Kardex, ED Summary, OR Summary, Procedure Summary, Intake/Output, Accordion, Recent Results, Med Rec Status, Cardiac Rhythm NSR, Alarm Parameters  and Quality Measures.

## 2019-02-24 NOTE — PROGRESS NOTES
Problem: Pressure Injury - Risk of  Goal: *Prevention of pressure injury  Document Osorio Scale and appropriate interventions in the flowsheet.   Outcome: Progressing Towards Goal  Pressure Injury Interventions:  Sensory Interventions: Assess changes in LOC, Discuss PT/OT consult with provider, Float heels, Keep linens dry and wrinkle-free    Moisture Interventions: Absorbent underpads, Apply protective barrier, creams and emollients    Activity Interventions: PT/OT evaluation, Increase time out of bed    Mobility Interventions: Float heels, HOB 30 degrees or less    Nutrition Interventions: Document food/fluid/supplement intake    Friction and Shear Interventions: Feet elevated on foot rest, HOB 30 degrees or less

## 2019-02-24 NOTE — DISCHARGE INSTRUCTIONS
[facility]    Discharge Instruction Sheet: Total Hip Replacement    DR. RIVERA    Blood Clot Prevention    Aspirin   You will be discharged on Aspirin to prevent blood clots. You will have to take it for approximately 4 weeks.  Do not take non-steroid anti-inflammatory medications (Ibuprofen, Advil, Motrin, Naproxen,. etc) until told to do so.  You will also be discharged on Pepcid to be taken twice daily with your Aspirin to prevent gastric ulcers and bleeding. Pain control:  Medications   Typically, we will prescribe a narcotic usually 1-2 tabs every three to four hours as needed for your pain. Most patients need this only for the first few weeks.  You should discontinue this as the pain decreases.  Some of these medications contain a large dose of Tylenol (acetaminophen). Therefore, you should consult your pharmacist before taking any additional Tylenol at the same time. You should not drive while taking any narcotic pain medications. Take your pain medications with food to prevent nausea! Your last dose of pain medication in the hospital was given @ __________    [address] will be discharged home with ice/gel packs.  Continue using these regularly to minimize pain and swelling, especially after physical activity. Constipation   Pain medication and anesthesia can be constipating-this can be prevented by gentle physical activity and drinking plenty of fluid.  It should be treated with over-the-counter medications such as Dulcolax, Miralax, Magnesium Citrate and/or Fleets enema.  You should have a bowel movement at least every other day following surgery. Incision care   You will be discharged with a transparent and waterproof dressing over your incision. o This should remain in place for 5-7 days after discharge.      You will be given one additional dressing to use at home in 5-7 days if you are still having drainage   You may shower with this dressing in place after you are discharged. o After showering pat the dressing/incision dry.  When the incision is no longer draining, it may be left open to the air.  DO NOT rub the incision.  DO NOT take a tub bath or go swimming until cleared by your doctor.  DO NOT apply lotions, oils, or creams to incision. To increase and promote healing:   Stop Smoking (or at least cut back on smoking).  Eat a well-balanced diet (high in protein and vitamin C)   If your appetite is poor, consider nutritional supplements like Ensure, Glucerna, or [place] Instant Breakfast.   If you are diabetic, controlling you blood sugars is very important to prevent infection and promote wound healing. Nutrition:   If you were on a supplement such as Ensure or Glucerna) while in the hospital, please continue using them with each meal for the next 30 days.  Eat a well-balanced diet - High in protein, high in vitamins and minerals, especially vitamin C and zinc.     Home Health:   If you have staples a home health nurse will remove them in about 10 days.  Physical Therapy will come to your home to continue training for walking, transferring and exercises. Physical Activity     TOE TOUCH WEIGHT BEARING ON THE RIGHT LEG.  REMEMBER YOUR POSTERIOR HIP PRECAUTIONS, AVOID HIGH HIP FLEXION AND TAKING YOUR LEG ACROSS YOUR BODY     Bed-rest may slow your recovery.  Use your walker and take short walks about every 2 hours.  Increase your distance each day.  NO DRIVING until told to do so. Warning signs: Please call your physician immediately at [phone] if you have   Bleeding from incision that is constant.    Change in mental status (unusual behavior or confusion)   If your incision develops redness or swelling   Change in wound drainage (increase in amount, color, or foul odor)   Temperature over 101.5 degrees Fahrenheit    Pain in the calf of your leg   Tenderness or redness in the calf of your leg   Increased swelling of the thigh, ankle, calf, or foot. Emergency: CALL 911 if you have   Shortness of breath   Chest pain   Localized chest pain when coughing or taking a deep breath    Follow-up    Please call your surgeons office at [phone] for a follow up appointment.   o You should call as soon as you get home or the next day after discharge. o Ask to make an appointment in 2 weeks.  You can return to work when cleared by a physician. During normal business hours you may reach Dr. Destiny Graves' team directly at [phone] if you have concerns or questions.

## 2019-02-24 NOTE — PROGRESS NOTES
2/24/2019 11:42 AM    Admit Date: 2/19/2019    Admit Diagnosis: Atrial fibrillation ([facility] [I48.91]; Periprosthetic fracture around internal prosthetic hip joint [M97. Bennie, [address]; A-fib ([facility] [I48.91]    Subjective:     Patricia Glover   denies chest pain, chest pressure/discomfort, dyspnea, palpitations, irregular heart beats, near-syncope, syncope, fatigue, orthopnea, paroxysmal nocturnal dyspnea, exertional chest pressure/discomfort.     Visit Vitals  /65 (BP 1 Location: Left arm, BP Patient Position: At rest)   Pulse 77   Temp 98.4 °F (36.9 °C)   Resp 14   Ht 5' 6\" (1.676 m)   Wt 193 lb 9 oz (87.8 kg)   SpO2 99%   BMI 31.24 kg/m²     Current Facility-Administered Medications   Medication Dose Route Frequency    amiodarone (CORDARONE) tablet 200 mg  200 mg Oral DAILY    0.9% sodium chloride infusion 250 mL  250 mL IntraVENous PRN    sodium chloride (NS) flush 5-40 mL  5-40 mL IntraVENous Q8H    sodium chloride (NS) flush 5-40 mL  5-40 mL IntraVENous PRN    acetaminophen (TYLENOL) tablet 650 mg  650 mg Oral Q6H    naloxone (NARCAN) injection 0.4 mg  0.4 mg IntraVENous PRN    ondansetron (ZOFRAN ODT) tablet 4 mg  4 mg Oral Q4H PRN    senna-docusate (PERICOLACE) 8.6-50 mg per tablet 1 Tab  1 Tab Oral BID    polyethylene glycol (MIRALAX) packet 17 g  17 g Oral DAILY    bisacodyl (DULCOLAX) suppository 10 mg  10 mg Rectal DAILY PRN    magnesium hydroxide (MILK OF MAGNESIA) 400 mg/5 mL oral suspension 30 mL  30 mL Oral DAILY PRN    oxyCODONE IR (ROXICODONE) tablet 10 mg  10 mg Oral Q3H PRN    oxyCODONE IR (ROXICODONE) tablet 5 mg  5 mg Oral Q3H PRN    morphine injection 1 mg  1 mg IntraVENous Q2H PRN    glucose chewable tablet 16 g  4 Tab Oral PRN    dextrose (D50W) injection syrg 12.5-25 g  12.5-25 g IntraVENous PRN    glucagon (GLUCAGEN) injection 1 mg  1 mg IntraMUSCular PRN    aspirin chewable tablet 81 mg  81 mg Oral BID    atorvastatin (LIPITOR) tablet 20 mg  20 mg Oral DAILY    famotidine (PEPCID) tablet 20 mg  20 mg Oral ACB    gemfibrozil (LOPID) tablet 300 mg  300 mg Oral ACB&D    acetaminophen (TYLENOL) tablet 650 mg  650 mg Oral Q4H PRN    ondansetron (ZOFRAN) injection 4 mg  4 mg IntraVENous Q4H PRN    bisacodyl (DULCOLAX) suppository 10 mg  10 mg Rectal DAILY PRN         Objective:      Visit Vitals  /65 (BP 1 Location: Left arm, BP Patient Position: At rest)   Pulse 77   Temp 98.4 °F (36.9 °C)   Resp 14   Ht 5' 6\" (1.676 m)   Wt 193 lb 9 oz (87.8 kg)   SpO2 99%   BMI 31.24 kg/m²       Physical Exam:  Abdomen: soft, non-tender. Bowel sounds normal.   Extremities: no cyanosis or edema  Heart: regular rate and rhythm, S1, S2 normal, no murmur, click, rub or gallop  Lungs: clear to auscultation bilaterally  Neurologic: Grossly normal    Data Review:   Labs:    Recent Results (from the past 24 hour(s))   CBC W/O DIFF    Collection Time: 02/24/19  3:46 AM   Result Value Ref Range    WBC 7.9 4.1 - 11.1 K/uL    RBC 2.45 (L) 4.10 - 5.70 M/uL    HGB 8.1 (L) 12.1 - 17.0 g/dL    HCT 24.1 (L) 36.6 - 50.3 %    MCV 98.4 80.0 - 99.0 FL    MCH 33.1 26.0 - 34.0 PG    MCHC 33.6 30.0 - 36.5 g/dL    RDW 17.2 (H) 11.5 - 14.5 %    PLATELET 636 197 - 810 K/uL    MPV 10.2 8.9 - 12.9 FL    NRBC 0.0 0  WBC    ABSOLUTE NRBC 0.00 0.00 - 0.01 K/uL       Telemetry: normal sinus rhythm      Assessment:     Active Problems:    Atrial fibrillation ([facility] (2/19/2019)      Periprosthetic fracture around internal prosthetic hip joint (2/19/2019)      A-fib ([facility] (2/19/2019)        Plan:     Remains in SR. Resume DOAC once clear by ortho as discussed by Dr. Tessie Jenkins. Nursing staff to clarify with ortho today.

## 2019-02-24 NOTE — PROGRESS NOTES
Problem: Mobility Impaired (Adult and Pediatric)  Goal: *Acute Goals and Plan of Care (Insert Text)  Physical Therapy Goals  Initiated 2/22/2019    1. Patient will transfer from bed to chair and chair to bed with modified independence using the least restrictive device within 7 day(s). 2.  Patient will perform sit to stand with independence within 7 day(s). 3.  Patient will ambulate with modified independence for 300 feet with the least restrictive device within 7 day(s). 4.  Patient will be I with R hip HEP within 7 days   physical Therapy TREATMENT  Patient: Dominguez Rivas (8 y.o. male)  Date: 2/24/2019  Diagnosis: Atrial fibrillation ([facility] [I48.91]  Periprosthetic fracture around internal prosthetic hip joint [M97. 8XXA, Z96.649]  A-fib ([facility] [I48.91] <principal problem not specified>  Procedure(s) (LRB):  REVISION OF PERIPROSTHETIC FRACTURE (Right) 3 Days Post-Op  Precautions: TTWB, Total hip(Anterior and Posterior. )  Chart, physical therapy assessment, plan of care and goals were reviewed. ASSESSMENT:  Patient is doing well improving with his bed mobility and ambulation and should be ready to go home with HHPT tomorrow as planned. He has some difficulty getting supine to sit and needs CGA to min A but all other functional mobility is good. He will have help at home. Pt ambulated 180 ft today in hallway with PWB on R and stand by assistance. Needed cueing to put walker out farther to allow for bigger step with L. Pt has only 1 step to get into house ,and feels quite capable of doing this with \"Naty\". Patient will be seen 1x/day as he is doing so well and can ambulate with staff as needed in hallway. Pt able to repeat post THR precautions. Pt left in chair with CNA to get ice for his hip. Nurse notified.   Progression toward goals:  [x]    Improving appropriately and progressing toward goals  []    Improving slowly and progressing toward goals  []    Not making progress toward goals and plan of care will be adjusted     PLAN:  Patient continues to benefit from skilled intervention to address the above impairments. Continue treatment per established plan of care. Discharge Recommendations:  Home Health  Further Equipment Recommendations for Discharge:  Has RW     SUBJECTIVE:   Patient stated Nadja Singh know what I can't do with my hip.     OBJECTIVE DATA SUMMARY:   Critical Behavior:  Neurologic State: Alert, Appropriate for age  Orientation Level: Oriented X4  Cognition: Appropriate decision making, Appropriate for age attention/concentration, Appropriate safety awareness     Functional Mobility Training:  Bed Mobility:  Rolling: Supervision  Supine to Sit: Minimum assistance              Transfers:  Sit to Stand: Stand-by assistance  Stand to Sit: Stand-by assistance        Bed to Chair: Contact guard assistance                    Balance:  Sitting: Intact  Standing: Impaired  Standing - Static: Good  Standing - Dynamic : Good  Ambulation/Gait Training:  Distance (ft): 180 Feet (ft)  Assistive Device: Walker, rolling;Gait belt  Ambulation - Level of Assistance: Stand-by assistance        Gait Abnormalities: Decreased step clearance(toeing in R (old))  Right Side Weight Bearing: Partial (%)  Left Side Weight Bearing: Full  Base of Support: Shift to left  Stance: Right decreased  Speed/Rayne: Pace decreased (<100 feet/min)  Step Length: Left shortened;Right shortened                    Stairs:              Neuro Re-Education:    Therapeutic Exercises:   Sitting heel slides x 15  Knee extensions x 15 B  Ankle pumps/circles x 20  Pain:  Pain Scale 1: Numeric (0 - 10)  Pain Intensity 1: 0              Activity Tolerance:   Good, VSS  Please refer to the flowsheet for vital signs taken during this treatment.   After treatment:   [x]    Patient left in no apparent distress sitting up in chair  []    Patient left in no apparent distress in bed  [x]    Call bell left within reach  [x]    Nursing notified  [] Caregiver present  []    Bed alarm activated    COMMUNICATION/COLLABORATION:   The patients plan of care was discussed with: Registered Nurse    Caron Falk, PT   Time Calculation: 30 mins

## 2019-02-24 NOTE — PROGRESS NOTES
Hospitalist Progress Note    NAME: Yoko Nye   :  1955   MRN:  [id number]       Assessment / Plan:  Afib RVR new onset with hypotension. Now resolved. Return to NSR. BP is stable  -amiodarone GTT transitioned to po  -resume home BP meds as tolerated  -cardio on board    Acute on chronic anemia. No gross signs of blood loss at this time. Hgb down to 6.1 upon admission. S/p 1u pRBC. Hgb stable  -trend H&H  -transfuse if Hgb <7 or symptomatic  -iron panel is unremarkable  -FOBT neg     Hyponatremia / JUDAH. Likely prerenal in origin. Baseline Cr 1.1, admission 1.84. Cr back to baseline  -Monitor cr/electrolytes/fluid status closely.      R hip prosthetic fx s/p repair   -s/p  R STARLA by Dr Lizzie Martinez with prosthetic broken during PT   -post op care per ortho  -pain management with bowel regimen   -PT/OT     DM type II  -holding metformin  -c/w SS insulin     Tobacco use  -pt was educated on smoking cessation  -nicotine patch      Hyperlipidemia, cont statin    GERD, cont PPI       Code Status: Full code     DVT Prophylaxis: scds    Dispo: Planned for d/c on Mon with [facility]     Subjective:     Chief Complaint / Reason for Physician Visit: AF    Intermittent hip pain but controlled. Otherwise no acute complaints. In NSR    Review of Systems: 14 pt ROS unremarkable except as stated above. Objective:     VITALS:   Last 24hrs VS reviewed since prior progress note.  Most recent are:  Patient Vitals for the past 24 hrs:   Temp Pulse Resp BP SpO2   19 1052 98.4 °F (36.9 °C) 77 14 129/65 99 %   19 0851 97.7 °F (36.5 °C) 63 14 120/65 98 %   19 0340 98.9 °F (37.2 °C) -- -- -- --   19 0339 98.9 °F (37.2 °C) 70 15 115/61 96 %   19 2318 98.8 °F (37.1 °C) 72 17 133/65 99 %   19 1912 -- 80 -- -- 99 %   19 1909 98.5 °F (36.9 °C) 66 15 129/63 100 %   19 1546 98.7 °F (37.1 °C) 68 16 113/56 97 %       Intake/Output Summary (Last 24 hours) at 2019 [address] [place] filed at 2/24/2019 1207  Gross per 24 hour   Intake 560 ml   Output 2055 ml   Net -1495 ml        PHYSICAL EXAM:  General:    Alert, cooperative, no distress, appears stated age. HEENT: Atraumatic, anicteric sclerae  Neck:  Supple, symmetrical  Lungs:   Clear to auscultation bilaterally. No Wheezing or Rhonchi. No rales. No Accessory muscle use. Heart:   RRR,  Normal S1 and S2   No edema  Abdomen:   Soft, non-tender. Not distended. Bowel sounds normal.  No rebound  Extremities: No cyanosis. No clubbing  Skin:     Warm, dry   Neurologic: No gross focal neuro deficit      Reviewed most current lab test results and cultures  YES  Reviewed most current radiology test results   YES  Review and summation of old records today    NO  Reviewed patient's current orders and MAR    YES  PMH/ reviewed - no change compared to H&P    ________________________________________________________________________  Mal Bañuelos MD     Procedures: see electronic medical records for all procedures/Xrays and details which were not copied into this note but were reviewed prior to creation of Plan. LABS:  I reviewed today's most current labs and imaging studies.   Pertinent labs include:  Recent Labs     02/24/19  0346 02/23/19  0251 02/22/19  0153   WBC 7.9 7.9  --    HGB 8.1* 8.4* 7.9*   HCT 24.1* 24.9*  --     211  --      Recent Labs     02/22/19  0153      K 4.2      CO2 23   GLU 96   BUN 16   CREA 1.03   CA 7.2*       Signed: Mal Bañuelos MD

## 2019-02-24 NOTE — PROGRESS NOTES
Bedside and Verbal shift change report given to Luis Flowers  (oncoming nurse) by Carmen Brasher RN (offgoing nurse). Report included the following information SBAR, Kardex, Procedure Summary, Intake/Output, MAR, Med Rec Status, Cardiac Rhythm SR and Alarm Parameters . Bedside and Verbal shift change report given to Carmen Brasher RN (oncoming nurse) by Fatemeh Luu RN (offgoing nurse). Report included the following information SBAR, Kardex, Intake/Output, MAR, Cardiac Rhythm SR and Alarm Parameters .

## 2019-02-24 NOTE — PROGRESS NOTES
ORTHO PROGRESS NOTE  Post Op day: 3 Days Post-Op    February 24, 2019 9:50 AM     Kyleblair Sreedhar    Attending Physician: Treatment Team: Attending Provider: Shelly Lambert MD; Utilization Review: Juli Benson, RN; Consulting Provider: Davdi Tai MD; Consulting Provider: Kirstin Thorne MD; Care Manager: Genevieve Merrill, RN; Utilization Review: Celio Arana     Feeling well. Pain controlled. Tolerating diet. Compliant with TTWB. Converted to PO amio. Vital Signs:    Patient Vitals for the past 8 hrs:   BP Temp Pulse Resp SpO2 Weight   02/24/19 0851 120/65 97.7 °F (36.5 °C) 63 14 98 % --   02/24/19 0547 -- -- -- -- -- 87.8 kg (193 lb 9 oz)   02/24/19 0340 -- 98.9 °F (37.2 °C) -- -- -- --   02/24/19 0339 115/61 98.9 °F (37.2 °C) 70 15 96 % --     BMI (calculated): 31.3 (02/24/19 0547)    LAB:    Recent Labs     02/24/19  0346   HCT 24.1*   HGB 8.1*       Objective: General: alert, cooperative, no distress. Cardiac: Normal S1&S2, no murmur. Resp: BBS clear, no wheezing. Gastrointestinal:  Soft, non-tender. Neuro/Vascular: CNS Intact. Sensation stable. Brisk cap refill, 2+ pulses UE/LE  Musculoskeletal:  . Sal's sign negative in bilateral lower extremities. Calves soft, supple, non-tender upon palpation or with passive stretch. Skin: Incision - clean, dry and intact. No significant erythema or swelling.     Dressing: clean, dry, and intact  Mild to mod ecchymosis and edema  Bey - n  Drain - n       PT/OT:   Gait:  Gait  Base of Support: Shift to left  Speed/Rayne: Pace decreased (<100 feet/min)  Step Length: Left shortened  Stance: Right decreased  Gait Abnormalities: Decreased step clearance  Ambulation - Level of Assistance: Stand-by assistance  Distance (ft): 150 Feet (ft)  Assistive Device: Gait belt, Walker, rolling                   Assessment:    s/p Procedure(s):  REVISION OF PERIPROSTHETIC FRACTURE    Active Problems:    Atrial fibrillation ([address] (2/19/2019) Periprosthetic fracture around internal prosthetic hip joint (2/19/2019)      A-fib ([address] (2/19/2019)         Plan:   Stable for discharge from ortho perspective   -  Continue PT/OT, TTWB, posterior precautions  -  Continue established methods of pain control  -  VTE Prophylaxes - TEDS &/or SCDs, ASA 81mg BID       Discharge To: Home with [facility] on Monday     Dr. Melanie White aware and agree with plan.      Signed By: REMBERTO Lopez    Physician Assistant, [facility] [place]

## 2019-02-24 NOTE — PROGRESS NOTES
Problem: Pressure Injury - Risk of  Goal: *Prevention of pressure injury  Document Osorio Scale and appropriate interventions in the flowsheet. Outcome: Progressing Towards Goal  Pressure Injury Interventions:  Sensory Interventions: Assess changes in LOC, Check visual cues for pain, Minimize linen layers, Turn and reposition approx.  every two hours (pillows and wedges if needed)    Moisture Interventions: Absorbent underpads, Minimize layers, Maintain skin hydration (lotion/cream)    Activity Interventions: PT/OT evaluation, Increase time out of bed    Mobility Interventions: HOB 30 degrees or less, PT/OT evaluation    Nutrition Interventions: Document food/fluid/supplement intake, Offer support with meals,snacks and hydration    Friction and Shear Interventions: HOB 30 degrees or less, Minimize layers

## 2019-02-25 VITALS
TEMPERATURE: 98.4 F | WEIGHT: 191.58 LBS | SYSTOLIC BLOOD PRESSURE: 136 MMHG | HEIGHT: 66 IN | HEART RATE: 68 BPM | RESPIRATION RATE: 18 BRPM | OXYGEN SATURATION: 98 % | DIASTOLIC BLOOD PRESSURE: 63 MMHG | BODY MASS INDEX: 30.79 KG/M2

## 2019-02-25 LAB
ERYTHROCYTE [DISTWIDTH] IN BLOOD BY AUTOMATED COUNT: 16.7 % (ref 11.5–14.5)
HCT VFR BLD AUTO: 25.6 % (ref 36.6–50.3)
HGB BLD-MCNC: 8.5 G/DL (ref 12.1–17)
MCH RBC QN AUTO: 32.7 PG (ref 26–34)
MCHC RBC AUTO-ENTMCNC: 33.2 G/DL (ref 30–36.5)
MCV RBC AUTO: 98.5 FL (ref 80–99)
NRBC # BLD: 0 K/UL (ref 0–0.01)
NRBC BLD-RTO: 0 PER 100 WBC
PLATELET # BLD AUTO: 242 K/UL (ref 150–400)
PMV BLD AUTO: 10 FL (ref 8.9–12.9)
RBC # BLD AUTO: 2.6 M/UL (ref 4.1–5.7)
WBC # BLD AUTO: 7.6 K/UL (ref 4.1–11.1)

## 2019-02-25 PROCEDURE — 97116 GAIT TRAINING THERAPY: CPT

## 2019-02-25 PROCEDURE — 97110 THERAPEUTIC EXERCISES: CPT

## 2019-02-25 PROCEDURE — 74011250637 HC RX REV CODE- 250/637: Performed by: NURSE PRACTITIONER

## 2019-02-25 PROCEDURE — 85027 COMPLETE CBC AUTOMATED: CPT

## 2019-02-25 PROCEDURE — 74011250637 HC RX REV CODE- 250/637: Performed by: HOSPITALIST

## 2019-02-25 PROCEDURE — 36415 COLL VENOUS BLD VENIPUNCTURE: CPT

## 2019-02-25 PROCEDURE — 74011250637 HC RX REV CODE- 250/637: Performed by: PHYSICIAN ASSISTANT

## 2019-02-25 RX ORDER — DILTIAZEM HYDROCHLORIDE 120 MG/1
120 CAPSULE, COATED, EXTENDED RELEASE ORAL DAILY
Qty: 30 CAP | Refills: 11 | Status: SHIPPED | OUTPATIENT
Start: 2019-02-26 | End: 2020-10-08

## 2019-02-25 RX ORDER — AMIODARONE HYDROCHLORIDE 200 MG/1
200 TABLET ORAL DAILY
Qty: 30 TAB | Refills: 0 | Status: SHIPPED | OUTPATIENT
Start: 2019-02-26 | End: 2021-02-05

## 2019-02-25 RX ORDER — DILTIAZEM HYDROCHLORIDE 120 MG/1
120 CAPSULE, COATED, EXTENDED RELEASE ORAL DAILY
Status: DISCONTINUED | OUTPATIENT
Start: 2019-02-25 | End: 2019-02-25 | Stop reason: HOSPADM

## 2019-02-25 RX ADMIN — SENNOSIDES AND DOCUSATE SODIUM 1 TABLET: 8.6; 5 TABLET ORAL at 09:05

## 2019-02-25 RX ADMIN — ASPIRIN 81 MG 81 MG: 81 TABLET ORAL at 09:05

## 2019-02-25 RX ADMIN — AMIODARONE HYDROCHLORIDE 200 MG: 200 TABLET ORAL at 09:06

## 2019-02-25 RX ADMIN — GEMFIBROZIL 300 MG: 600 TABLET ORAL at 09:05

## 2019-02-25 RX ADMIN — FAMOTIDINE 20 MG: 20 TABLET ORAL at 09:05

## 2019-02-25 RX ADMIN — POLYETHYLENE GLYCOL 3350 17 G: 17 POWDER, FOR SOLUTION ORAL at 09:05

## 2019-02-25 RX ADMIN — ATORVASTATIN CALCIUM 20 MG: 20 TABLET, FILM COATED ORAL at 09:05

## 2019-02-25 RX ADMIN — DILTIAZEM HYDROCHLORIDE 120 MG: 120 CAPSULE, COATED, EXTENDED RELEASE ORAL at 13:05

## 2019-02-25 RX ADMIN — ACETAMINOPHEN 650 MG: 325 TABLET ORAL at 05:43

## 2019-02-25 RX ADMIN — Medication 10 ML: at 05:44

## 2019-02-25 RX ADMIN — ACETAMINOPHEN 650 MG: 325 TABLET ORAL at 13:05

## 2019-02-25 NOTE — PROGRESS NOTES
Spiritual Care Assessment/Progress Note  [facility]      NAME: Dominguez Rivas      MRN: [id number]  AGE: 62 y.o.  SEX: male  Lutheran Affiliation: No [employer]ar[employer]   Language: English     2/25/2019     Total Time (in minutes): 8     Spiritual Assessment begun in [facility] PROGRESSIVE CARE through conversation with:         []Patient        [] Family    [] Friend(s)        Reason for Consult: Initial/Spiritual assessment, patient floor, Initial visit     Spiritual beliefs: (Please include comment if needed)     [x] Identifies with a devon tradition:         [] Supported by a devon community:            [] Claims no spiritual orientation:           [] Seeking spiritual identity:                [] Adheres to an individual form of spirituality:           [] Not able to assess:                           Identified resources for coping:      [] Prayer                               [] Music                  [] Guided Imagery     [x] Family/friends                 [] Pet visits     [] Devotional reading                         [] Unknown     [] Other:                                               Interventions offered during this visit: (See comments for more details)    Patient Interventions: Initial/Spiritual assessment, patient floor, Initial visit, Affirmation of emotions/emotional suffering     Family/Friend(s): Initial Assessment, Affirmation of emotions/emotional suffering     Plan of Care:     [x] Support spiritual and/or cultural needs    [] Support AMD and/or advance care planning process      [] Support grieving process   [] Coordinate Rites and/or Rituals    [] Coordination with community clergy   [] No spiritual needs identified at this time   [] Detailed Plan of Care below (See Comments)  [] Make referral to Music Therapy  [] Make referral to Pet Therapy     [] Make referral to Addiction services  [] Make referral to [facility]  [] Make referral to Spiritual Care Partner  [] No future visits requested        [x] Follow up visits as needed     Comments:  made visit to patient on Progressive Care. Patients daughter was in the room with the patient. They were packing up his belonging as he was about to be discharged. He was excited to be going home. Patient is going home with family.  provided pastoral support and care to the patient and family.      Dayton Perez MDiv  Pager: [phone]

## 2019-02-25 NOTE — PROGRESS NOTES
Received CM consult to check if Eliquis 5 mg BID is covered by pt's insurance. CM called pt's pharmacy [address] [place] [address] and left a message. 1:40pm - CM spoke with Fara Fierro the pharmacist at [address] [place] [address] and he checked pt's insurance coverage for [employer]. The Elliquis is covered and pt has a $40 copay. CM informed pt and also gave pt the Elliquis 30 day free card and the $10 copay. CHYNA spoke with Leslee Feliz NP who requested the Eliquis coverage and informed her of the update. Leslee Felzi stated pt didn't need to go to the coumadin clinic. F/u appts made and documented on the discharge paperwork. Pt's PCP office phone number not working and pt will need to make his own appt. Pt's family will transport pt home by car. 3pm - CM cancelled the Coumadin clinic appt. Care Management Interventions  PCP Verified by CM: Yes  Mode of Transport at Discharge: Other (see comment)(pt's friend will transport by car)  Transition of Care Consult (CM Consult): Discharge Planning, [address]: No  Reason Outside [place]: Patient already serviced by other home care/hospice agency([facility])  Discharge Durable Medical Equipment: No  Physical Therapy Consult: Yes  Occupational Therapy Consult: Yes  Speech Therapy Consult: No  Current Support Network:  Other, Own Home(lives with son in a 1 story home with 3 steps to the entrance)  Confirm Follow Up Transport: Family  Plan discussed with Pt/Family/Caregiver: Yes  Freedom of Choice Offered: Yes  Discharge Location  Discharge Placement: Home with home health      Sadia Hermosillo, [address]

## 2019-02-25 NOTE — PROGRESS NOTES
Problem: Mobility Impaired (Adult and Pediatric)  Goal: *Acute Goals and Plan of Care (Insert Text)  Physical Therapy Goals  Initiated 2/22/2019    1. Patient will transfer from bed to chair and chair to bed with modified independence using the least restrictive device within 7 day(s). 2.  Patient will perform sit to stand with independence within 7 day(s). 3.  Patient will ambulate with modified independence for 300 feet with the least restrictive device within 7 day(s). 4.  Patient will be I with R hip HEP within 7 days   physical Therapy TREATMENT  Patient: Deja Marmolejo (80 y.o. male)  Date: 2/25/2019  Diagnosis: Atrial fibrillation ([address] [I48.91]                        Periprosthetic fracture around internal prosthetic hip joint [M97. 8XXA, Z96.649]         A-fib ([address] [I48.91]     Procedure(s) (LRB):  REVISION OF PERIPROSTHETIC FRACTURE (Right) 4 Days Post-Op     Precautions: TTWB, Total hip(Anterior and Posterior. )  Chart, physical therapy assessment, plan of care and goals were reviewed. ASSESSMENT: pt tolerated tx well, no LOB or SOB, maintains TTWB well, does well with bed mob and transfers, good motivation, does well with ther-ex, vc's for safety and RW use. Progression toward goals:  [x]    Improving appropriately and progressing toward goals  []    Improving slowly and progressing toward goals  []    Not making progress toward goals and plan of care will be adjusted     PLAN:  Patient continues to benefit from skilled intervention to address the above impairments. Continue treatment per established plan of care.   Discharge Recommendations:  Home Health -> Outpatient  Further Equipment Recommendations for Discharge:  rolling walker     OBJECTIVE DATA SUMMARY:     Critical Behavior:  Neurologic State: Alert  Orientation Level: Oriented X4  Cognition: Follows commands     Functional Mobility Training:  Bed Mobility:  Rolling: Supervision  Supine to Sit: Supervision  Scooting: Supervision  Level of Assistance: Supervision  Interventions: Verbal cues     Transfers:  Sit to Stand: Supervision  Stand to Sit: Supervision  Bed to Chair: Supervision  Interventions: Verbal cues  Level of Assistance: Supervision     Balance:  Sitting: Intact; Without support  Standing: Intact; With support  Standing - Static: Good;Constant support  Standing - Dynamic : Good     Ambulation/Gait Training:  Distance (ft): 120 Feet (ft)  Assistive Device: Walker, rolling;Gait belt  Ambulation - Level of Assistance: Supervision  Gait Abnormalities: Antalgic;Decreased step clearance  Right Side Weight Bearing:  Toe touch  Left Side Weight Bearing: Full  Base of Support: Shift to left  Stance: Right decreased  Speed/Rayne: Pace decreased (<100 feet/min)  Step Length: Left shortened    Therapeutic Exercises:   sitting  EXERCISE   Sets   Reps   Active Active Assist   Passive   Comments   Ankle pumps 1 10 [x] [] [] bilat   Heel raises 1 10 [x] [] [] \"   Toe tap 1 10 [x] [] [] \"   Knee ext 1 10 [x] [] [] \"   Hip flex 1 10 [x] [] [] \"     Pain:  Pain Scale 1: Numeric (0 - 10)  Pain Intensity 1: 0    Activity Tolerance: good    After treatment:   [x]    Patient left in no apparent distress sitting up in chair  []    Patient left in no apparent distress in bed  [x]    Call bell left within reach  [x]    Nursing notified  []    Caregiver present  []    Bed alarm activated    COMMUNICATION/COLLABORATION:   The patients plan of care was discussed with: Registered Nurse    Magdaleno Bailey PTA   Time Calculation: 25 mins

## 2019-02-25 NOTE — PROGRESS NOTES
Problem: Pressure Injury - Risk of  Goal: *Prevention of pressure injury  Document Osorio Scale and appropriate interventions in the flowsheet. Outcome: Progressing Towards Goal  Pressure Injury Interventions:  Sensory Interventions: Assess changes in LOC, Minimize linen layers, Avoid rigorous massage over bony prominences    Moisture Interventions: Minimize layers, Absorbent underpads    Activity Interventions: Increase time out of bed    Mobility Interventions: Float heels, HOB 30 degrees or less, PT/OT evaluation    Nutrition Interventions: Document food/fluid/supplement intake, Offer support with meals,snacks and hydration    Friction and Shear Interventions: Feet elevated on foot rest, HOB 30 degrees or less               Problem: Falls - Risk of  Goal: *Absence of Falls  Document Meghann Fall Risk and appropriate interventions in the flowsheet.   Outcome: Progressing Towards Goal  Fall Risk Interventions:  Mobility Interventions: Bed/chair exit alarm, Patient to call before getting OOB, Utilize walker, cane, or other assistive device         Medication Interventions: Patient to call before getting OOB, Teach patient to arise slowly    Elimination Interventions: Call light in reach, Patient to call for help with toileting needs, Urinal in reach

## 2019-02-25 NOTE — PROGRESS NOTES
Ortho/ NeuroSurgery NP Note    s/p REVISION OF PERIPROSTHETIC FRACTURE on 2/21/2019     Pt resting in chair. No complaints. Pain well controlled. Feels ready for discharge to home. VSS Afebrile.  + PO intake  + Voiding  + BM     Labs  Lab Results   Component Value Date/Time    HGB 8.5 (L) 02/25/2019 04:12 AM      HGB stabilized    Body mass index is 30.92 kg/m². Reference: BMI greater than 30 is classified as obesity and greater than 40 is classified as morbid obesity. Dressing c.d.i, cryotherapy  Calves soft and supple; No pain with passive stretch  Sensation and motor intact     PLAN:  1) PT ongoing. 2) Aspirin 81 mg PO BID for DVT Prophylaxis- d/w cardiology what they would like Eliquis 5 mg BID for anticoagulation with A-Fib. Dr. Margo Barnard ok to start this on 3/2/19. 3) Pepcid for GI Prophylaxis. 4) [facility]    Addendum:    Spoke with Dr. Adolfo Maxwell further and decided that coumadin would be a better option. Patient set up in the Coumadin Clinic at Dr. Nasreen Marinelli office.      Nam Solano NP   2/25/2019  11:39 AM

## 2019-02-25 NOTE — OP NOTES
[address]  OPERATIVE REPORT    Name:  Deena Martino  MR#:  [id number]  :  1955  ACCOUNT #:  [de-identified]  DATE OF SERVICE:  2019    PREOPERATIVE DIAGNOSIS:  Right hip periprosthetic femur fracture. POSTOPERATIVE DIAGNOSIS:  Right hip periprosthetic femur fracture. PROCEDURES PERFORMED:  1. Open reduction and internal fixation, right femoral shaft fracture. 2.  Revision of femoral component. SURGEON:  Seymour Herrera MD    ASSISTANT:  REMBERTO Walters    ANESTHESIA:  General.    COMPLICATIONS:  None. SPECIMENS REMOVED:  None. IMPLANTS: Exactech Alteon Monobloc Revision stem 17mm x 245mm, 36mm/+0mm Biolox delta femoral head, multiple Synthes cerclage cables. ESTIMATED BLOOD LOSS:  450 mL. DRAINS:  None. CONDITION:  Stable to PACU. INDICATIONS:  A 81-year-old gentleman who recently underwent right total hip arthroplasty. He suffered periprosthetic fracture requiring fixation/revision of his stem. He understood, no guarantees could be given about the outcome and wished to proceed. DESCRIPTION OF PROCEDURE:  After being identified in the preoperative holding area and having his operative site marked, the patient was brought back to the operating room and placed supine on the standard OR table. General anesthesia was induced without difficulty. Preoperative antibiotics were administered. The patient was moved into a decubitus position with the right side up, making sure to pad all bony prominences. The right lower extremity was then prepped and draped in usual fashion using sterile technique. Appropriate time-out was performed. We utilized a posterolateral approach. The short external rotators and posterior capsule were taken down as a single sleeve and the hip was dislocated. The previous femoral component was removed. We then divided the vastus lateralis along the midline of the femur and identified the fracture site.   This was cerclaged with 2 braided cables and provisionally clamped. We then sequentially reamed for insertion of a splined stem. We placed a trial and adjusted our anteversion to the appropriate position to maintain a stable hip. We selected our final implant. The Alteon Monobloc stem was inserted in appropriate rotation. We selected a 36 mm/+0 mm ceramic head. This was impacted on to the trunnion and the hip reduced. He was taken through a range of motion and was found to be stable in all planes. We then placed additional cables to supplement our fixation. We treated this essentially as an extended trochanteric osteotomy based on his fraction pattern. Thorough irrigation and routine closure was performed. The patient was awakened, moved to the stretcher, and taken to the recovery room in satisfactory condition. At the conclusion of the procedure, all counts were correct. There were no immediate complications.         Miachel Osler, MD      [id number]  D:  02/25/2019 10:56  T:  02/25/2019 14:35  JOB #:  [id number]

## 2019-02-25 NOTE — PROGRESS NOTES
Bedside shift change report given to Bhavna De Anda (oncoming nurse) by Doug Nguyen (offgoing nurse). Report included the following information SBAR, Kardex, ED Summary, Procedure Summary, Intake/Output, MAR, Recent Results, Med Rec Status, Cardiac Rhythm NSR, Alarm Parameters  and Quality Measures.

## 2019-02-25 NOTE — DISCHARGE SUMMARY
Hospitalist Discharge Summary     Patient ID:  Barrie Caro  [id number]  74 y.o.  1955    PCP on record: Robert Mccoy MD    Admit date: 2/19/2019  Discharge date and time: 2/25/2019      DISCHARGE DIAGNOSIS AND HOSPITAL COURSE:    Afib RVR new onset with hypotension. Now resolved. Return to NSR. BP is stable  -amiodarone GTT transitioned to po  -started on eliquis for [facility]  -po cardizem  -cardio on board; will f/u as outpt     Acute on chronic anemia. No gross signs of blood loss at this time. Hgb down to 6.1 upon admission. S/p 1u pRBC. Hgb stable  -trend H&H stable  -iron panel is unremarkable  -FOBT neg     Hyponatremia / JUDAH. Likely prerenal in origin. Baseline Cr 1.1, admission 1.84. Cr back to baseline with gentle IVF hydration     R hip prosthetic fx s/p repair 2/21  -s/p 2/11 R STARLA by Dr Qi Hunt prosthetic broken during PT   -post op care per ortho  -pain management with bowel regimen   -PT/OT with [facility]     DM type II  -on SS insulin  -can resume home regimen upon d/c with outpt titration     Tobacco use  -pt was educated on smoking cessation     Hyperlipidemia, cont statin    CONSULTATIONS:  IP CONSULT TO ORTHOPEDIC SURGERY    Excerpted HPI from H&P of Miryam Ndiaye MD:  Barrie Caro is a 62 y.o.  male who presents with above complaint. Pt with h/o R STARLA done on 2/11 by Dr Jenn Bailey. He was getting PT at home. Last week while working with PT his leg was turned outward. Since that time his pain got worse and he was not able to ambulate. Pt was supposed to have repair of broken prothesis today. He was found in Afib RVR when he was connected to tele monitoring in holding area. His BP on arrival was at 89/67. Cardiology was consulted. Pt was started on amio gtt. BP remain low side. He was already given 1 L of bolus and another 500 cc in progress now.  We were consulted by cardiology to help with admitting pt to the hospital. Pt denies previous h/o Afib. No CP/dyspnea. He stated he had good po intake last week.         ______________________________________________________________________  Patient seen and examined by me on discharge day. Pertinent Findings:  General:          Alert, cooperative, no distress, appears stated age. HEENT:           Atraumatic, anicteric sclerae  Neck:               Supple, symmetrical  Lungs:             Clear to auscultation bilaterally. No Wheezing or Rhonchi. No rales. No Accessory muscle use. Heart:              RRR,  Normal S1 and S2   No edema  Abdomen:        Soft, non-tender. Not distended. Bowel sounds normal.  No rebound  Extremities:     No cyanosis. No clubbing  Skin:                Warm, dry   Neurologic:      No gross focal neuro deficit      _______________________________________________________________________  DISCHARGE MEDICATIONS:   Current Discharge Medication List      START taking these medications    Details   amiodarone (CORDARONE) 200 mg tablet Take 1 Tab by mouth daily. Qty: 30 Tab, Refills: 0      apixaban (ELIQUIS) 5 mg tablet Take 1 Tab by mouth every twelve (12) hours. Qty: 60 Tab, Refills: 11      dilTIAZem CD (CARDIZEM CD) 120 mg ER capsule Take 1 Cap by mouth daily. Qty: 30 Cap, Refills: 11         CONTINUE these medications which have CHANGED    Details   !! aspirin 81 mg chewable tablet Take 1 Tab by mouth two (2) times a day. Qty: 60 Tab, Refills: 0      !! famotidine (PEPCID) 20 mg tablet Take 1 Tab by mouth Daily (before breakfast). Qty: 60 Tab, Refills: 0      oxyCODONE IR (ROXICODONE) 5 mg immediate release tablet Take 1 Tab by mouth every three (3) hours as needed. Max Daily Amount: 40 mg.  Qty: 60 Tab, Refills: 0    Associated Diagnoses: Periprosthetic fracture around internal prosthetic right hip joint, initial encounter ([facility] [address]       ! ! - Potential duplicate medications found. Please discuss with provider.       CONTINUE these medications which have NOT CHANGED    Details   !! aspirin 81 mg chewable tablet Take 1 Tab by mouth two (2) times a day for 30 days. Qty: 60 Tab, Refills: 0      acetaminophen (TYLENOL) 325 mg tablet Take 2 Tabs by mouth every six (6) hours for 14 days. Qty: 112 Tab, Refills: 0      !! famotidine (PEPCID) 20 mg tablet Take 1 Tab by mouth two (2) times a day for 30 days. Qty: 60 Tab, Refills: 0      polyethylene glycol (MIRALAX) 17 gram packet Take 1 Packet by mouth daily as needed (constipation) for up to 15 days. Qty: 15 Packet, Refills: 0      senna-docusate (PERICOLACE) 8.6-50 mg per tablet Take 1 Tab by mouth daily. Qty: 30 Tab, Refills: 0      multivitamin (ONE A DAY) tablet Take 1 Tab by mouth daily. lisinopril-hydroCHLOROthiazide (PRINZIDE, ZESTORETIC) 20-12.5 mg per tablet Take 1 Tab by mouth two (2) times a day. atorvastatin (LIPITOR) 20 mg tablet Take 20 mg by mouth daily. docosahexanoic acid/epa (FISH OIL PO) Take 1,000 mg by mouth daily. gemfibrozil (LOPID) 600 mg tablet Take  by mouth two (2) times a day. METFORMIN HCL (METFORMIN PO) Take 1,000 mg by mouth two (2) times a day. !! - Potential duplicate medications found. Please discuss with provider. My Recommended Diet, Activity, Wound Care, and follow-up labs are listed in the patient's Discharge Insturctions which I have personally completed and reviewed.     ______________________________________________________________________    Risk of deterioration: Low    Condition at Discharge:  Stable  ______________________________________________________________________    Disposition  Home with family and home health services    ______________________________________________________________________      Code Status: Full Code  ______________________________________________________________________      Follow up with:   PCP : Dave Johnson MD  Follow-up Information     Follow up With Specialties Details Why [address], Muriel Antunez MD Orthopedic Surgery Go on 3/7/2019 For post op appointment at 2:00PM  [address]  [address] [facility],[address]  [facility]  [phone]       [address]  PT services  [address] [place] [address]    Ming Malloy MD Family Practice  pt will call with a f/u appt in 1 week  [phone]      Cristela De Leon MD Cardiology, [facility] Vascular Surgery, Internal Medicine Schedule an appointment as soon as possible for a visit on 2/27/2019 your appt is at 3:15pm with Karis Stein  [address]  [phone]                Total time in minutes spent coordinating this discharge (includes going over instructions, follow-up, prescriptions, and preparing report for sign off to her PCP) :  50 minutes    Signed:  Gerardo Campbell MD

## 2019-02-25 NOTE — PROGRESS NOTES
CM sent referrals to [facility], [facility] , [facility] , [facility], Kev Carlton BS [facility], [facility] on Friday and they all denied pt for [facility] due to location or not contracted with pt's insurance. CM sent out referrals today to [facility], [facility] and [facility] via allscripts. 10:30am - [facility] accepted pt. CM called [facility] and requested that a different PT follow pt and they agreed. CM spoke with pt and informed him that [facility] is the only [address] [place], [place] [address] that has accepted him. They will have a different PT follow pt in the home and he was in agreement.      Haja Almonte, [address]

## 2019-02-26 PROBLEM — Z96.641 HISTORY OF TOTAL RIGHT HIP REPLACEMENT: Status: ACTIVE | Noted: 2019-02-11

## 2019-02-26 PROBLEM — S72.8X1A OTHER FRACTURE OF RIGHT FEMUR, INITIAL ENCOUNTER FOR CLOSED FRACTURE (HCC): Status: ACTIVE | Noted: 2019-02-26

## 2019-02-27 ENCOUNTER — OFFICE VISIT (OUTPATIENT)
Dept: CARDIOLOGY CLINIC | Age: 64
End: 2019-02-27

## 2019-02-27 VITALS
WEIGHT: 188.2 LBS | BODY MASS INDEX: 30.25 KG/M2 | HEIGHT: 66 IN | HEART RATE: 66 BPM | DIASTOLIC BLOOD PRESSURE: 70 MMHG | OXYGEN SATURATION: 93 % | RESPIRATION RATE: 16 BRPM | SYSTOLIC BLOOD PRESSURE: 118 MMHG

## 2019-02-27 DIAGNOSIS — I48.91 ATRIAL FIBRILLATION, UNSPECIFIED TYPE (HCC): Primary | ICD-10-CM

## 2019-02-27 DIAGNOSIS — I48.0 PAROXYSMAL ATRIAL FIBRILLATION (HCC): ICD-10-CM

## 2019-02-27 RX ORDER — ACETAMINOPHEN 325 MG/1
TABLET ORAL
COMMUNITY
End: 2020-10-08

## 2019-02-27 NOTE — PROGRESS NOTES
Armando Salgado DNP, ANP-BC  Subjective/HPI:     Nuno Stiles is a 62 y.o. male is here for transitional care hospital follow-up where patient had presented for revision of hip hardware discovered to be in A. fib RVR when connected to hospital monitor and preop. Was seen by cardiology converted with amiodarone rate control with diltiazem. He had his hip revision surgery and is discharged. He reports feeling well denies fluttering palpitations shortness of breath or chest pain. Patient reports he has not started Eliquis, there is hand written instructions on his discharge according to patient not to start it for \"2 weeks\".       Hospitalist discharge    Admit date: 2/19/2019  Discharge date and time: 2/25/2019        DISCHARGE DIAGNOSIS AND HOSPITAL COURSE:     Afib RVR new onset with hypotension.  Now resolved.  Return to NSR.  BP is stable  -amiodarone GTT transitioned to po  -started on eliquis for [facility]  -po cardizem  -cardio on board; will f/u as outpt     Acute on chronic anemia.  No gross signs of blood loss at this time.  Hgb down to 6.1 upon admission.  S/p 1u pRBC.  Hgb stable  -trend H&H stable  -iron panel is unremarkable  -FOBT neg     Hyponatremia / JUDAH.  Likely prerenal in origin.  Baseline Cr 1.1, admission 1.84.  Cr back to baseline with gentle IVF hydration     R hip prosthetic fx s/p repair 2/21  -s/p 2/11 R STARLA by Dr Lorin Downing prosthetic broken during PT   -post op care per ortho  -pain management with bowel regimen   -PT/OT with C     DM type II  -on SS insulin  -can resume home regimen upon d/c with outpt titration     Tobacco use  -pt was educated on smoking cessation     Hyperlipidemia, cont statin     PCP Provider  Dave Johnson MD  Past Medical History:   Diagnosis Date    Arthritis     Atrial fibrillation ([address]     CAD (coronary artery disease)     lipids    Chronic obstructive pulmonary disease ([address]     Chronic pain     [facility] [address]     Hypertension     Infectious disease       Past Surgical History:   Procedure Laterality Date    HX HIP REPLACEMENT Right 02/11/2019    Acetabular bone graft    HX TONSILLECTOMY       No Known Allergies   Family History   Problem Relation Age of Onset    Heart Disease Mother     Heart Disease Father     Alzheimer Father     Heart Disease Brother       Current Outpatient Medications   Medication Sig    acetaminophen (TYLENOL) 325 mg tablet Take  by mouth every four (4) hours as needed for Pain.  amiodarone (CORDARONE) 200 mg tablet Take 1 Tab by mouth daily.  apixaban (ELIQUIS) 5 mg tablet Take 1 Tab by mouth every twelve (12) hours.  dilTIAZem CD (CARDIZEM CD) 120 mg ER capsule Take 1 Cap by mouth daily.  oxyCODONE IR (ROXICODONE) 5 mg immediate release tablet Take 1 Tab by mouth every three (3) hours as needed. Max Daily Amount: 40 mg.    aspirin 81 mg chewable tablet Take 1 Tab by mouth two (2) times a day for 30 days.  famotidine (PEPCID) 20 mg tablet Take 1 Tab by mouth two (2) times a day for 30 days. (Patient taking differently: Take 20 mg by mouth daily.)    polyethylene glycol (MIRALAX) 17 gram packet Take 1 Packet by mouth daily as needed (constipation) for up to 15 days.  senna-docusate (PERICOLACE) 8.6-50 mg per tablet Take 1 Tab by mouth daily.  multivitamin (ONE A DAY) tablet Take 1 Tab by mouth daily.  lisinopril-hydroCHLOROthiazide (PRINZIDE, ZESTORETIC) 20-12.5 mg per tablet Take 1 Tab by mouth two (2) times a day.  atorvastatin (LIPITOR) 20 mg tablet Take 20 mg by mouth daily.  docosahexanoic acid/epa (FISH OIL PO) Take 1,000 mg by mouth daily.  gemfibrozil (LOPID) 600 mg tablet Take  by mouth two (2) times a day.  METFORMIN HCL (METFORMIN PO) Take 1,000 mg by mouth two (2) times a day. No current facility-administered medications for this visit.        Vitals:    02/27/19 1505 02/27/19 1517   BP: 120/70 118/70   Pulse: 66    Resp: 16    SpO2: 93%    Weight: 188 lb 3.2 oz (85.4 kg)    Height: 5' 6\" (1.676 m)      Social History     Socioeconomic History    Marital status: SINGLE     Spouse name: Not on file    Number of children: Not on file    Years of education: Not on file    Highest education level: Not on file   Social Needs    Financial resource strain: Not on file    Food insecurity - worry: Not on file    Food insecurity - inability: Not on file    Transportation needs - medical: Not on file   [employer] needs - non-medical: Not on file   Occupational History    Not on file   Tobacco Use    Smoking status: Current Every Day Smoker     Packs/day: 1.50     Years: 30.00     Pack years: 45.00    Smokeless tobacco: Never Used    Tobacco comment: 2 ppd for years/cutting back   Substance and Sexual Activity    Alcohol use: Yes     Alcohol/week: 2.4 oz     Types: 4 Cans of beer per week     Comment: weekends    Drug use: No    Sexual activity: Not on file   Other Topics Concern    Not on file   Social History Narrative    Not on file       I have reviewed the nurses notes, vitals, problem list, allergy list, medical history, family, social history and medications. Review of Symptoms:    General: Pt denies excessive weight gain or loss. Pt is able to conduct ADL's  HEENT: Denies blurred vision, headaches, epistaxis and difficulty swallowing. Respiratory: Denies shortness of breath, HERNANDEZ, wheezing or stridor. Cardiovascular: Denies precordial pain, palpitations, edema or PND  Gastrointestinal: Denies poor appetite, indigestion, abdominal pain or blood in stool  Musculoskeletal: Patient reports hip postop pain otherwise all other joints feel fine  Neurologic: Denies tremor, paresthesias, or sensory motor disturbance  Skin: Denies rash, itching or texture change. Physical Exam:      General: Well developed, in no acute distress, cooperative and alert  HEENT: No carotid bruits, no JVD, trach is midline. Neck Supple, PEERL, EOM intact.   Heart:  Normal S1/S2 negative S3 or S4. Regular, no murmur, gallop or rub.   Respiratory: Clear bilaterally x 4, no wheezing or rales  Abdomen:   Soft, non-tender, no masses, bowel sounds are active.   Extremities:  No edema, normal cap refill, no cyanosis, atraumatic. Neuro: A&Ox3, speech clear, gait stable with use of walker  Skin: Skin color is normal. No rashes or lesions. Non diaphoretic  Vascular: 2+ pulses symmetric in all extremities    Cardiographics    ECG: This rhythm  Results for orders placed or performed during the hospital encounter of 02/19/19   EKG, 12 LEAD, INITIAL   Result Value Ref Range    Ventricular Rate 131 BPM    Atrial Rate 166 BPM    QRS Duration 88 ms    Q-T Interval 294 ms    QTC Calculation (Bezet) 434 ms    Calculated R Axis 25 degrees    Calculated T Axis -130 degrees    Diagnosis       Atrial fibrillation with rapid ventricular response  Nonspecific T wave abnormality , probably digitalis effect  When compared with ECG of 04-FEB-2019 09:43,  Atrial fibrillation has replaced Sinus rhythm  Vent. rate has increased BY  58 BPM  Confirmed by Fariba Vickers ([id number]) on 2/20/2019 1:41:24 PM           Cardiology Labs:  No results found for: CHOL, CHOLX, CHLST, CHOLV, 049101, HDL, LDL, LDLC, DLDLP, Liv Shall, [facility], [facility]    Lab Results   Component Value Date/Time    Sodium 136 02/22/2019 01:53 AM    Potassium 4.2 02/22/2019 01:53 AM    Chloride 105 02/22/2019 01:53 AM    CO2 23 02/22/2019 01:53 AM    Anion gap 8 02/22/2019 01:53 AM    Glucose 96 02/22/2019 01:53 AM    BUN 16 02/22/2019 01:53 AM    Creatinine 1.03 02/22/2019 01:53 AM    BUN/Creatinine ratio 16 02/22/2019 01:53 AM    GFR est AA >60 02/22/2019 01:53 AM    GFR est non-AA >60 02/22/2019 01:53 AM    Calcium 7.2 (L) 02/22/2019 01:53 AM    Bilirubin, total 0.5 02/04/2019 09:29 AM    AST (SGOT) 13 (L) 02/04/2019 09:29 AM    Alk.  phosphatase 116 02/04/2019 09:29 AM    Protein, total 8.4 (H) 02/04/2019 09:29 AM    Albumin 4.0 02/04/2019 09:29 AM    Globulin 4.4 (H) 02/04/2019 09:29 AM    A-G Ratio 0.9 (L) 02/04/2019 09:29 AM    ALT (SGPT) 19 02/04/2019 09:29 AM           Assessment:     Assessment:     Diagnoses and all orders for this visit:    1. Atrial fibrillation, unspecified type (HCC)  -     AMB POC EKG ROUTINE W/ 12 LEADS, INTER & REP  -     CBC WITH AUTOMATED DIFF; Future    2. Paroxysmal atrial fibrillation (HCC)        ICD-10-CM ICD-9-CM    1. Atrial fibrillation, unspecified type (HCC) I48.91 427.31 AMB POC EKG ROUTINE W/ 12 LEADS, INTER & REP      CBC WITH AUTOMATED DIFF   2. Paroxysmal atrial fibrillation (HCC) I48.0 427.31      Orders Placed This Encounter    CBC WITH AUTOMATED DIFF     Standing Status:   Future     Standing Expiration Date:   8/27/2019    AMB POC EKG ROUTINE W/ 12 LEADS, INTER & REP     Order Specific Question:   Reason for Exam:     Answer:   ROUTINE    acetaminophen (TYLENOL) 325 mg tablet     Sig: Take  by mouth every four (4) hours as needed for Pain. Plan:     1. New onset A. fib RVR seen at time of admission for hip surgery: Converted to sinus rhythm on amiodarone Cardizem. Dr. Trina Fraser spoke with Dr. Cira Browning of orthopedic surgery patient may begin Eliquis on Saturday, March 2. Patient was given a lab slip to recheck his CBC in 1 week upon starting anticoagulation. 2.  Hypertension: Controlled continue current medication  3. Anemia secondary to surgical procedure: Repeat CBC in 1 week. Follow-up with Dr. Rut Liu in 1 month, revisit antiarrhythmic therapy as he is currently on amiodarone at 62years old with COPD. Killian Roman NP    This note was created using voice recognition software. Despite editing, there may be syntax errors.

## 2019-02-27 NOTE — PROGRESS NOTES
1. Have you been to the ER, urgent care clinic since your last visit? Hospitalized since your last visit? Joann 34.     2. Have you seen or consulted any other health care providers outside of the [address] [place] [address] since your last visit? Include any pap smears or colon screening. Tata Sorenson.

## 2019-03-06 DIAGNOSIS — I48.91 ATRIAL FIBRILLATION, UNSPECIFIED TYPE (HCC): ICD-10-CM

## 2019-03-08 LAB
BASOPHILS # BLD AUTO: 0.1 X10E3/UL (ref 0–0.2)
BASOPHILS NFR BLD AUTO: 1 %
EOSINOPHIL # BLD AUTO: 0.1 X10E3/UL (ref 0–0.4)
EOSINOPHIL NFR BLD AUTO: 1 %
ERYTHROCYTE [DISTWIDTH] IN BLOOD BY AUTOMATED COUNT: 17 % (ref 12.3–15.4)
HCT VFR BLD AUTO: 27.5 % (ref 37.5–51)
HGB BLD-MCNC: 9.2 G/DL (ref 13–17.7)
IMM GRANULOCYTES # BLD AUTO: 0.1 X10E3/UL (ref 0–0.1)
IMM GRANULOCYTES NFR BLD AUTO: 1 %
LYMPHOCYTES # BLD AUTO: 1.8 X10E3/UL (ref 0.7–3.1)
LYMPHOCYTES NFR BLD AUTO: 19 %
MCH RBC QN AUTO: 31.7 PG (ref 26.6–33)
MCHC RBC AUTO-ENTMCNC: 33.5 G/DL (ref 31.5–35.7)
MCV RBC AUTO: 95 FL (ref 79–97)
MONOCYTES # BLD AUTO: 0.6 X10E3/UL (ref 0.1–0.9)
MONOCYTES NFR BLD AUTO: 7 %
NEUTROPHILS # BLD AUTO: 6.9 X10E3/UL (ref 1.4–7)
NEUTROPHILS NFR BLD AUTO: 71 %
PLATELET # BLD AUTO: 335 X10E3/UL (ref 150–379)
RBC # BLD AUTO: 2.9 X10E6/UL (ref 4.14–5.8)
WBC # BLD AUTO: 9.5 X10E3/UL (ref 3.4–10.8)

## 2019-03-08 NOTE — PROGRESS NOTES
Mary Jo: Please call patient hemoglobin has improved, at this point he should have started back on Eliquis last week. Continue Eliquis.

## 2019-03-11 ENCOUNTER — TELEPHONE (OUTPATIENT)
Dept: CARDIOLOGY CLINIC | Age: 64
End: 2019-03-11

## 2019-03-11 NOTE — TELEPHONE ENCOUNTER
----- Message from Leonor Peterson NP sent at 3/8/2019  8:59 AM EST -----  Mary Jo: Please call patient hemoglobin has improved, at this point he should have started back on Eliquis last week. Continue Eliquis.

## 2019-03-13 ENCOUNTER — TELEPHONE (OUTPATIENT)
Dept: CARDIOLOGY CLINIC | Age: 64
End: 2019-03-13

## 2019-09-08 NOTE — PERIOP NOTES
Handoff Report from Operating Room to PACU    Report received from MEG Selby RN and Sadiq Rosario CRNA regarding [place] [facility]. Surgeon(s):  Romayne Cousin, MD  And Procedure(s) (LRB):  REVISION OF PERIPROSTHETIC FRACTURE (Right)  confirmed   with allergies, dressings and local anesthetic discussed. Anesthesia type, drugs, patient history, complications, estimated blood loss, vital signs, intake and output, and last pain medication, lines, reversal medications and temperature were reviewed. Implemented All Fall with Harm Risk Interventions:  [place] to call system. Call bell, personal items and telephone within reach. Instruct patient to call for assistance. Room bathroom lighting operational. Non-slip footwear when patient is off stretcher. Physically safe environment: no spills, clutter or unnecessary equipment. Stretcher in lowest position, wheels locked, appropriate side rails in place. Provide visual cue, wrist band, yellow gown, etc. Monitor gait and stability. Monitor for mental status changes and reorient to person, place, and time. Review medications for side effects contributing to fall risk. Reinforce activity limits and safety measures with patient and family. Provide visual clues: red socks.

## 2020-06-29 RX ORDER — APIXABAN 5 MG/1
TABLET, FILM COATED ORAL
Qty: 60 TAB | Refills: 1 | Status: SHIPPED | OUTPATIENT
Start: 2020-06-29 | End: 2020-10-08

## 2020-10-08 ENCOUNTER — HOSPITAL ENCOUNTER (OUTPATIENT)
Age: 65
Setting detail: OBSERVATION
Discharge: HOME | End: 2020-10-09
Attending: STUDENT IN AN ORGANIZED HEALTH CARE EDUCATION/TRAINING PROGRAM | Admitting: FAMILY MEDICINE
Payer: COMMERCIAL

## 2020-10-08 DIAGNOSIS — D64.9 ANEMIA, UNSPECIFIED TYPE: Primary | ICD-10-CM

## 2020-10-08 PROBLEM — K92.2 GI BLEED: Status: ACTIVE | Noted: 2020-10-08

## 2020-10-08 LAB
ALBUMIN SERPL-MCNC: 3.2 G/DL (ref 3.5–5)
ALBUMIN/GLOB SERPL: 0.7 {RATIO} (ref 1.1–2.2)
ALP SERPL-CCNC: 134 U/L (ref 45–117)
ALT SERPL-CCNC: 18 U/L (ref 12–78)
ANION GAP SERPL CALC-SCNC: 5 MMOL/L (ref 5–15)
APPEARANCE UR: CLEAR
AST SERPL-CCNC: 15 U/L (ref 15–37)
ATRIAL RATE: 71 BPM
BACTERIA URNS QL MICRO: NEGATIVE /HPF
BASOPHILS # BLD: 0 K/UL (ref 0–0.1)
BASOPHILS NFR BLD: 0 % (ref 0–1)
BILIRUB SERPL-MCNC: 0.3 MG/DL (ref 0.2–1)
BILIRUB UR QL: NEGATIVE
BUN SERPL-MCNC: 22 MG/DL (ref 6–20)
BUN/CREAT SERPL: 15 (ref 12–20)
CALCIUM SERPL-MCNC: 8.4 MG/DL (ref 8.5–10.1)
CALCULATED P AXIS, ECG09: 42 DEGREES
CALCULATED R AXIS, ECG10: 32 DEGREES
CALCULATED T AXIS, ECG11: 30 DEGREES
CHLORIDE SERPL-SCNC: 111 MMOL/L (ref 97–108)
CO2 SERPL-SCNC: 23 MMOL/L (ref 21–32)
COLOR UR: ABNORMAL
CREAT SERPL-MCNC: 1.43 MG/DL (ref 0.7–1.3)
DIAGNOSIS, 93000: NORMAL
DIFFERENTIAL METHOD BLD: ABNORMAL
EOSINOPHIL # BLD: 0 K/UL (ref 0–0.4)
EOSINOPHIL NFR BLD: 0 % (ref 0–7)
EPITH CASTS URNS QL MICRO: ABNORMAL /LPF
ERYTHROCYTE [DISTWIDTH] IN BLOOD BY AUTOMATED COUNT: 19.5 % (ref 11.5–14.5)
FERRITIN SERPL-MCNC: 15 NG/ML (ref 26–388)
FOLATE SERPL-MCNC: 37.8 NG/ML (ref 5–21)
GLOBULIN SER CALC-MCNC: 4.9 G/DL (ref 2–4)
GLUCOSE BLD STRIP.AUTO-MCNC: 88 MG/DL (ref 65–100)
GLUCOSE BLD STRIP.AUTO-MCNC: 92 MG/DL (ref 65–100)
GLUCOSE BLD STRIP.AUTO-MCNC: 96 MG/DL (ref 65–100)
GLUCOSE SERPL-MCNC: 97 MG/DL (ref 65–100)
GLUCOSE UR STRIP.AUTO-MCNC: NEGATIVE MG/DL
HCT VFR BLD AUTO: 21.9 % (ref 36.6–50.3)
HEMOCCULT STL QL: NEGATIVE
HGB BLD-MCNC: 6.6 G/DL (ref 12.1–17)
HGB BLD-MCNC: 6.9 G/DL (ref 12.1–17)
HGB BLD-MCNC: 7.8 G/DL (ref 12.1–17)
HGB UR QL STRIP: ABNORMAL
HISTORY CHECKED?,CKHIST: NORMAL
HISTORY CHECKED?,CKHIST: NORMAL
HYALINE CASTS URNS QL MICRO: ABNORMAL /LPF (ref 0–5)
IMM GRANULOCYTES # BLD AUTO: 0.1 K/UL (ref 0–0.04)
IMM GRANULOCYTES NFR BLD AUTO: 1 % (ref 0–0.5)
IRON SATN MFR SERPL: 9 % (ref 20–50)
IRON SERPL-MCNC: 38 UG/DL (ref 35–150)
KETONES UR QL STRIP.AUTO: NEGATIVE MG/DL
LEUKOCYTE ESTERASE UR QL STRIP.AUTO: NEGATIVE
LYMPHOCYTES # BLD: 0.9 K/UL (ref 0.8–3.5)
LYMPHOCYTES NFR BLD: 13 % (ref 12–49)
MCH RBC QN AUTO: 28.8 PG (ref 26–34)
MCHC RBC AUTO-ENTMCNC: 30.1 G/DL (ref 30–36.5)
MCV RBC AUTO: 95.6 FL (ref 80–99)
MONOCYTES # BLD: 0.7 K/UL (ref 0–1)
MONOCYTES NFR BLD: 10 % (ref 5–13)
NEUTS SEG # BLD: 5.2 K/UL (ref 1.8–8)
NEUTS SEG NFR BLD: 76 % (ref 32–75)
NITRITE UR QL STRIP.AUTO: NEGATIVE
NRBC # BLD: 0 K/UL (ref 0–0.01)
NRBC BLD-RTO: 0 PER 100 WBC
P-R INTERVAL, ECG05: 180 MS
PH UR STRIP: 6 [PH] (ref 5–8)
PLATELET # BLD AUTO: 184 K/UL (ref 150–400)
PMV BLD AUTO: 11.1 FL (ref 8.9–12.9)
POTASSIUM SERPL-SCNC: 4.6 MMOL/L (ref 3.5–5.1)
PROT SERPL-MCNC: 8.1 G/DL (ref 6.4–8.2)
PROT UR STRIP-MCNC: ABNORMAL MG/DL
Q-T INTERVAL, ECG07: 380 MS
QRS DURATION, ECG06: 92 MS
QTC CALCULATION (BEZET), ECG08: 412 MS
RBC # BLD AUTO: 2.29 M/UL (ref 4.1–5.7)
RBC #/AREA URNS HPF: ABNORMAL /HPF (ref 0–5)
RBC MORPH BLD: ABNORMAL
SERVICE CMNT-IMP: NORMAL
SODIUM SERPL-SCNC: 139 MMOL/L (ref 136–145)
SP GR UR REFRACTOMETRY: 1.01 (ref 1–1.03)
TIBC SERPL-MCNC: 415 UG/DL (ref 250–450)
UROBILINOGEN UR QL STRIP.AUTO: 0.2 EU/DL (ref 0.2–1)
VENTRICULAR RATE, ECG03: 71 BPM
VIT B12 SERPL-MCNC: 366 PG/ML (ref 193–986)
WBC # BLD AUTO: 6.9 K/UL (ref 4.1–11.1)
WBC URNS QL MICRO: ABNORMAL /HPF (ref 0–4)

## 2020-10-08 PROCEDURE — 99218 HC RM OBSERVATION: CPT

## 2020-10-08 PROCEDURE — 94760 N-INVAS EAR/PLS OXIMETRY 1: CPT

## 2020-10-08 PROCEDURE — 36415 COLL VENOUS BLD VENIPUNCTURE: CPT

## 2020-10-08 PROCEDURE — P9040 RBC LEUKOREDUCED IRRADIATED: HCPCS

## 2020-10-08 PROCEDURE — C9113 INJ PANTOPRAZOLE SODIUM, VIA: HCPCS | Performed by: FAMILY MEDICINE

## 2020-10-08 PROCEDURE — 36430 TRANSFUSION BLD/BLD COMPNT: CPT

## 2020-10-08 PROCEDURE — 82272 OCCULT BLD FECES 1-3 TESTS: CPT

## 2020-10-08 PROCEDURE — 74011250637 HC RX REV CODE- 250/637: Performed by: FAMILY MEDICINE

## 2020-10-08 PROCEDURE — P9016 RBC LEUKOCYTES REDUCED: HCPCS

## 2020-10-08 PROCEDURE — 82607 VITAMIN B-12: CPT

## 2020-10-08 PROCEDURE — 96374 THER/PROPH/DIAG INJ IV PUSH: CPT

## 2020-10-08 PROCEDURE — 81001 URINALYSIS AUTO W/SCOPE: CPT

## 2020-10-08 PROCEDURE — 93005 ELECTROCARDIOGRAM TRACING: CPT

## 2020-10-08 PROCEDURE — 99285 EMERGENCY DEPT VISIT HI MDM: CPT

## 2020-10-08 PROCEDURE — 85025 COMPLETE CBC W/AUTO DIFF WBC: CPT

## 2020-10-08 PROCEDURE — 80053 COMPREHEN METABOLIC PANEL: CPT

## 2020-10-08 PROCEDURE — 83036 HEMOGLOBIN GLYCOSYLATED A1C: CPT

## 2020-10-08 PROCEDURE — 86923 COMPATIBILITY TEST ELECTRIC: CPT

## 2020-10-08 PROCEDURE — 74011000250 HC RX REV CODE- 250: Performed by: FAMILY MEDICINE

## 2020-10-08 PROCEDURE — 2709999900 HC NON-CHARGEABLE SUPPLY

## 2020-10-08 PROCEDURE — 83540 ASSAY OF IRON: CPT

## 2020-10-08 PROCEDURE — 82746 ASSAY OF FOLIC ACID SERUM: CPT

## 2020-10-08 PROCEDURE — 85018 HEMOGLOBIN: CPT

## 2020-10-08 PROCEDURE — 82962 GLUCOSE BLOOD TEST: CPT

## 2020-10-08 PROCEDURE — 86900 BLOOD TYPING SEROLOGIC ABO: CPT

## 2020-10-08 PROCEDURE — 74011250636 HC RX REV CODE- 250/636: Performed by: FAMILY MEDICINE

## 2020-10-08 PROCEDURE — 82728 ASSAY OF FERRITIN: CPT

## 2020-10-08 RX ORDER — AMIODARONE HYDROCHLORIDE 200 MG/1
200 TABLET ORAL DAILY
Status: DISCONTINUED | OUTPATIENT
Start: 2020-10-09 | End: 2020-10-09 | Stop reason: HOSPADM

## 2020-10-08 RX ORDER — MAGNESIUM SULFATE 100 %
4 CRYSTALS MISCELLANEOUS AS NEEDED
Status: DISCONTINUED | OUTPATIENT
Start: 2020-10-08 | End: 2020-10-09 | Stop reason: HOSPADM

## 2020-10-08 RX ORDER — SODIUM CHLORIDE 0.9 % (FLUSH) 0.9 %
5-40 SYRINGE (ML) INJECTION EVERY 8 HOURS
Status: DISCONTINUED | OUTPATIENT
Start: 2020-10-08 | End: 2020-10-09 | Stop reason: HOSPADM

## 2020-10-08 RX ORDER — SODIUM CHLORIDE 9 MG/ML
75 INJECTION, SOLUTION INTRAVENOUS CONTINUOUS
Status: DISCONTINUED | OUTPATIENT
Start: 2020-10-08 | End: 2020-10-09 | Stop reason: HOSPADM

## 2020-10-08 RX ORDER — SODIUM CHLORIDE 0.9 % (FLUSH) 0.9 %
5-40 SYRINGE (ML) INJECTION AS NEEDED
Status: DISCONTINUED | OUTPATIENT
Start: 2020-10-08 | End: 2020-10-09 | Stop reason: HOSPADM

## 2020-10-08 RX ORDER — PANTOPRAZOLE SODIUM 40 MG/1
40 TABLET, DELAYED RELEASE ORAL DAILY
COMMUNITY

## 2020-10-08 RX ORDER — SODIUM CHLORIDE 9 MG/ML
250 INJECTION, SOLUTION INTRAVENOUS AS NEEDED
Status: DISCONTINUED | OUTPATIENT
Start: 2020-10-08 | End: 2020-10-09 | Stop reason: HOSPADM

## 2020-10-08 RX ORDER — ASPIRIN 81 MG/1
81 TABLET ORAL DAILY
COMMUNITY

## 2020-10-08 RX ORDER — FAMOTIDINE 40 MG/1
40 TABLET, FILM COATED ORAL DAILY
COMMUNITY

## 2020-10-08 RX ORDER — INSULIN LISPRO 100 [IU]/ML
INJECTION, SOLUTION INTRAVENOUS; SUBCUTANEOUS EVERY 6 HOURS
Status: DISCONTINUED | OUTPATIENT
Start: 2020-10-08 | End: 2020-10-09 | Stop reason: HOSPADM

## 2020-10-08 RX ORDER — DEXTROSE 50 % IN WATER (D50W) INTRAVENOUS SYRINGE
25-50 AS NEEDED
Status: DISCONTINUED | OUTPATIENT
Start: 2020-10-08 | End: 2020-10-09 | Stop reason: HOSPADM

## 2020-10-08 RX ORDER — GEMFIBROZIL 600 MG/1
600 TABLET, FILM COATED ORAL 2 TIMES DAILY
Status: DISCONTINUED | OUTPATIENT
Start: 2020-10-08 | End: 2020-10-09 | Stop reason: HOSPADM

## 2020-10-08 RX ORDER — ATORVASTATIN CALCIUM 20 MG/1
20 TABLET, FILM COATED ORAL DAILY
Status: DISCONTINUED | OUTPATIENT
Start: 2020-10-09 | End: 2020-10-09 | Stop reason: HOSPADM

## 2020-10-08 RX ORDER — BISMUTH SUBSALICYLATE 262 MG
1 TABLET,CHEWABLE ORAL DAILY
COMMUNITY

## 2020-10-08 RX ADMIN — Medication 10 ML: at 21:47

## 2020-10-08 RX ADMIN — Medication 10 ML: at 16:43

## 2020-10-08 RX ADMIN — SODIUM CHLORIDE 75 ML/HR: 900 INJECTION, SOLUTION INTRAVENOUS at 16:43

## 2020-10-08 RX ADMIN — GEMFIBROZIL 600 MG: 600 TABLET ORAL at 18:37

## 2020-10-08 RX ADMIN — SODIUM CHLORIDE 40 MG: 9 INJECTION, SOLUTION INTRAMUSCULAR; INTRAVENOUS; SUBCUTANEOUS at 18:33

## 2020-10-08 RX ADMIN — SODIUM CHLORIDE 75 ML/HR: 900 INJECTION, SOLUTION INTRAVENOUS at 21:45

## 2020-10-08 NOTE — H&P
History & Physical    Primary Care Provider: Heather Villafana MD  Source of Information: Patient     History of Presenting Illness:   Roxy Solomon is a 60 y.o. male who presents with acute anemia    History was primarily obtained from the patient    Patient reports that he went to his primary care physician for routine visit today, was found to have low blood counts and was sent to the ER for further management and evaluation. Patient in the ER was found to have a hemoglobin of 6.6, had a unit of PRBC ordered and was requested to be observed under the hospitalist service. Patient reports that he has history of atrial fibrillation and takes Eliquis on a daily basis. Patient denies any obvious source of bleeding, denies any history of peptic ulcer disease, denies any other concerns or problems. The patient denies any Headache, blurry vision, sore throat, trouble swallowing, trouble with speech, chest pain, SOB, cough, fever, chills, N/V/D, abd pain, urinary symptoms, constipation, recent travels, sick contacts, focal or generalized neurological symptoms,  falls, injuries, rashes, contact with COVID-19 diagnosed patients, hematemesis, melena, hemoptysis, hematuria, rashes, denies starting any new medications and denies any other concerns or problems besides as mentioned above. Review of Systems:  A comprehensive review of systems was negative except for that written in the History of Present Illness.      Past Medical History:   Diagnosis Date    Arthritis     Atrial [facility] ([facility]     CAD (coronary artery disease)     lipids    Chronic obstructive pulmonary disease (HCC)     Chronic pain     Diabetes ([facility] [facility]     Hypertension     Infectious disease       Past Surgical History:   Procedure Laterality Date    HX HIP REPLACEMENT Right 02/11/2019    Acetabular bone graft    HX TONSILLECTOMY       Prior to Admission medications    Medication Sig Start Date End Date Taking? Authorizing Provider   famotidine (PEPCID) 40 mg tablet Take 40 mg by mouth daily. Yes Other, MD Patrice   pantoprazole (PROTONIX) 40 mg tablet Take 40 mg by mouth daily. Yes Other, MD Patrice   apixaban (Eliquis) 5 mg tablet Take 5 mg by mouth two (2) times a day. Yes Provider, Historical   docosahexaenoic acid/epa (FISH OIL PO) Take 1 Tab by mouth daily. Yes Provider, Historical   aspirin delayed-release 81 mg tablet Take 81 mg by mouth daily. Yes Provider, Historical   multivitamin (ONE A DAY) tablet Take 1 Tab by mouth daily. Yes Provider, Historical   amiodarone (CORDARONE) 200 mg tablet Take 1 Tab by mouth daily. 2/26/19  Yes Juanita Oconnell MD   senna-docusate (PERICOLACE) 8.6-50 mg per tablet Take 1 Tab by mouth daily. 2/12/19  Yes Astrid Polk NP   lisinopril-hydroCHLOROthiazide (PRINZIDE, ZESTORETIC) 20-12.5 mg per tablet Take 1 Tab by mouth two (2) times a day. Yes Provider, Historical   atorvastatin (LIPITOR) 20 mg tablet Take 20 mg by mouth daily. Yes Provider, Historical   gemfibrozil (LOPID) 600 mg tablet Take 600 mg by mouth two (2) times a day. Yes Other, MD Patrice   metFORMIN (GLUCOPHAGE) 1,000 mg tablet Take 1,000 mg by mouth two (2) times a day.    Yes Other, MD Patrice     No Known Allergies   Family History   Problem Relation Age of Onset    Heart Disease Mother     Heart Disease Father     Alzheimer Father     Heart Disease Brother         SOCIAL HISTORY:  Patient resides:  Independently x   Assisted Living    SNF    With family care       Smoking history:   None    Former    Chronic x     Alcohol history:   None    Social x   Chronic      Ambulates:   Independently x   w/cane    w/walker    w/wc    CODE STATUS:  DNR    Full x   Other      Objective:     Physical Exam:     Visit Vitals  /62   Pulse 60   Temp 98.5 °F (36.9 °C)   Resp 15   Ht 5' 6\" (1.676 m)   Wt 86.2 kg (190 lb 0.6 oz)   SpO2 98%   BMI 30.67 kg/m²      O2 Device: Room air    General : alert x 3, awake, no acute distress, resting in bed, pleasant male , appears to be stated age  [de-identified]: PEERL, EOMI, moist mucus membrane, TM clear  Neck: supple, no JVD, no meningeal signs  Chest: Clear to auscultation bilaterally   CVS: S1 S2 heard, Capillary refill less than 2 seconds  Abd: soft/ Non tender, non distended, BS physiological,   Ext: no clubbing, no cyanosis, no edema, brisk 2+ DP pulses  Neuro/Psych: pleasant mood and affect, CN 2-12 grossly intact, sensory grossly within normal limit, Strength 5/5 in all extremities, DTR 1+ x 4  Skin: warm        EKG: Pending  Data Review:     Recent Days:  Recent Labs     10/08/20  0950   WBC 6.9   HGB 6.6*   HCT 21.9*        Recent Labs     10/08/20  0950      K 4.6   *   CO2 23   GLU 97   BUN 22*   CREA 1.43*   CA 8.4*   ALB 3.2*   ALT 18     No results for input(s): PH, PCO2, PO2, HCO3, FIO2 in the last 72 hours. 24 Hour Results:  Recent Results (from the past 24 hour(s))   CBC WITH AUTOMATED DIFF    Collection Time: 10/08/20  9:50 AM   Result Value Ref Range    WBC 6.9 4.1 - 11.1 K/uL    RBC 2.29 (L) 4.10 - 5.70 M/uL    HGB 6.6 (L) 12.1 - 17.0 g/dL    HCT 21.9 (L) 36.6 - 50.3 %    MCV 95.6 80.0 - 99.0 FL    MCH 28.8 26.0 - 34.0 PG    MCHC 30.1 30.0 - 36.5 g/dL    RDW 19.5 (H) 11.5 - 14.5 %    PLATELET 303 629 - 020 K/uL    MPV 11.1 8.9 - 12.9 FL    NRBC 0.0 0  WBC    ABSOLUTE NRBC 0.00 0.00 - 0.01 K/uL    NEUTROPHILS 76 (H) 32 - 75 %    LYMPHOCYTES 13 12 - 49 %    MONOCYTES 10 5 - 13 %    EOSINOPHILS 0 0 - 7 %    BASOPHILS 0 0 - 1 %    IMMATURE GRANULOCYTES 1 (H) 0.0 - 0.5 %    ABS. NEUTROPHILS 5.2 1.8 - 8.0 K/UL    ABS. LYMPHOCYTES 0.9 0.8 - 3.5 K/UL    ABS. MONOCYTES 0.7 0.0 - 1.0 K/UL    ABS. EOSINOPHILS 0.0 0.0 - 0.4 K/UL    ABS. BASOPHILS 0.0 0.0 - 0.1 K/UL    ABS. IMM.  GRANS. 0.1 (H) 0.00 - 0.04 K/UL    DF SMEAR SCANNED      RBC COMMENTS ANISOCYTOSIS  1+        RBC COMMENTS MICROCYTOSIS  PRESENT        RBC COMMENTS HYPOCHROMIA  1+ RBC COMMENTS POLYCHROMASIA  PRESENT       METABOLIC PANEL, COMPREHENSIVE    Collection Time: 10/08/20  9:50 AM   Result Value Ref Range    Sodium 139 136 - 145 mmol/L    Potassium 4.6 3.5 - 5.1 mmol/L    Chloride 111 (H) 97 - 108 mmol/L    CO2 23 21 - 32 mmol/L    Anion gap 5 5 - 15 mmol/L    Glucose 97 65 - 100 mg/dL    BUN 22 (H) 6 - 20 MG/DL    Creatinine 1.43 (H) 0.70 - 1.30 MG/DL    BUN/Creatinine ratio 15 12 - 20      GFR est AA >60 >60 ml/min/1.73m2    GFR est non-AA 50 (L) >60 ml/min/1.73m2    Calcium 8.4 (L) 8.5 - 10.1 MG/DL    Bilirubin, total 0.3 0.2 - 1.0 MG/DL    ALT (SGPT) 18 12 - 78 U/L    AST (SGOT) 15 15 - 37 U/L    Alk. phosphatase 134 (H) 45 - 117 U/L    Protein, total 8.1 6.4 - 8.2 g/dL    Albumin 3.2 (L) 3.5 - 5.0 g/dL    Globulin 4.9 (H) 2.0 - 4.0 g/dL    A-G Ratio 0.7 (L) 1.1 - 2.2     TYPE & SCREEN    Collection Time: 10/08/20  9:50 AM   Result Value Ref Range    Crossmatch Expiration 10/11/2020     ABO/Rh(D) A POSITIVE     Antibody screen NEG     Unit number [id number]     Blood component type RC LR     Unit division 00     Status of unit ISSUED     Crossmatch result Compatible    RBC, ALLOCATE    Collection Time: 10/08/20 11:15 AM   Result Value Ref Range    HISTORY CHECKED?  Historical check performed    OCCULT BLOOD, STOOL    Collection Time: 10/08/20 11:28 AM   Result Value Ref Range    Occult blood, stool Negative NEG     URINALYSIS W/ RFLX MICROSCOPIC    Collection Time: 10/08/20 11:56 AM   Result Value Ref Range    Color YELLOW/STRAW      Appearance CLEAR CLEAR      Specific gravity 1.008 1.003 - 1.030      pH (UA) 6.0 5.0 - 8.0      Protein TRACE (A) NEG mg/dL    Glucose Negative NEG mg/dL    Ketone Negative NEG mg/dL    Bilirubin Negative NEG      Blood SMALL (A) NEG      Urobilinogen 0.2 0.2 - 1.0 EU/dL    Nitrites Negative NEG      Leukocyte Esterase Negative NEG      WBC 0-4 0 - 4 /hpf    RBC 5-10 0 - 5 /hpf    Epithelial cells FEW FEW /lpf    Bacteria Negative NEG /hpf Hyaline cast 0-2 0 - 5 /lpf         Imaging:   No results found.   Assessment/Plan      Acute anemia: Unclear etiology, differential includes slow GI bleed versus bone marrow dysfunction versus other etiology, patient will be observed on a telemetry bed, n.p.o., Protonix twice daily, GI consult, iron panel, B12, folate, H&H every 8 hours, patient being transfused 1 unit PRBC, further transfusions if hemoglobin less than 7, await GI input, may consider getting hematology consult if needed, continue to monitor    Atrial fibrillation: Hold Eliquis for now, monitor on telemetry, supportive care, continue home medications and further intervention per hospital course, currently rate controlled, EKG pending      JUDAH on CKD: Avoid nephrotoxic medication, renally dose oral medication, gentle IV hydration, repeat labs in the morning, if creatinine uptrending may consider getting a nephrology consult and further imaging, continue to monitor    Diabetes mellitus type 2: Patient will be on sliding scale insulin, Accu-Cheks, diet control, close monitoring, further intervention per hospital course, reassess as needed    Hypertension: Continue home medications and continue monitor    GI DVT prophylaxis: Patient is on Protonix and SCDs             Signed By: Brenda Campos MD     October 8, 2020

## 2020-10-08 NOTE — ED NOTES
TRANSFER - OUT REPORT:    Verbal report given to Alejandra Asif (name) on Ernestina Pastures  being transferred to [facility] (unit) for routine progression of care       Report consisted of patients Situation, Background, Assessment and   Recommendations(SBAR). Information from the following report(s) SBAR, ED Summary, [facility] and Recent Results was reviewed with the receiving nurse. Lines:   Peripheral IV 10/08/20 Right Antecubital (Active)   Site Assessment Clean, dry, & intact 10/08/20 0957   Phlebitis Assessment 0 10/08/20 0957   Infiltration Assessment 0 10/08/20 0957   Dressing Status Clean, dry, & intact 10/08/20 0957   Dressing Type Tape;Transparent 10/08/20 0957   Hub Color/Line Status Pink;Flushed;Patent 10/08/20 0957   Action Taken Blood drawn 10/08/20 0957        Opportunity for questions and clarification was provided.

## 2020-10-08 NOTE — ED NOTES
This RN stayed with patient for first 15 minutes of transfusion and educated pt on transfusion reactions

## 2020-10-08 NOTE — ED PROVIDER NOTES
EMERGENCY DEPARTMENT HISTORY AND PHYSICAL EXAM      Date: 10/8/2020  Patient Name: Mattie Amin    History of Presenting Illness     Chief Complaint   Patient presents with    Hospital [facility] Referral / Consult     Patient referred here for abnormal lab work. Family reports that his HGB is low and his blood is sticky. HPI: Mattie Amin, 60 y.o. male presents to the ED with cc of anemia. He states that he was told to come in because his blood counts were low. He denies any significant complaints at this time, he denies any lightheadedness, chest pain, shortness of breath. Denies any black or bloody stools, denies any dysuria or hematuria. Reports a chronic cough, no change in this. Denies any fevers. He does take Eliquis for atrial fibrillation. No history of needing blood transfusions in the past.          There are no other complaints, changes, or physical findings at this time. PCP: Jenn Garvey MD    No current facility-administered medications on file prior to encounter. Current Outpatient Medications on File Prior to Encounter   Medication Sig Dispense Refill    famotidine (PEPCID) 40 mg tablet Take 40 mg by mouth daily.  pantoprazole (PROTONIX) 40 mg tablet Take 40 mg by mouth daily.  apixaban (Eliquis) 5 mg tablet Take 5 mg by mouth two (2) times a day.  docosahexaenoic acid/epa (FISH OIL PO) Take 1 Tab by mouth daily.  aspirin delayed-release 81 mg tablet Take 81 mg by mouth daily.  multivitamin (ONE A DAY) tablet Take 1 Tab by mouth daily.  amiodarone (CORDARONE) 200 mg tablet Take 1 Tab by mouth daily. 30 Tab 0    senna-docusate (PERICOLACE) 8.6-50 mg per tablet Take 1 Tab by mouth daily. 30 Tab 0    lisinopril-hydroCHLOROthiazide (PRINZIDE, ZESTORETIC) 20-12.5 mg per tablet Take 1 Tab by mouth two (2) times a day.  atorvastatin (LIPITOR) 20 mg tablet Take 20 mg by mouth daily.       gemfibrozil (LOPID) 600 mg tablet Take 600 mg by mouth two (2) times a day.      metFORMIN (GLUCOPHAGE) 1,000 mg tablet Take 1,000 mg by mouth two (2) times a day.  [DISCONTINUED] Eliquis 5 mg tablet T1T PO EVERY 12 HOURS - PLEASE CALL AND MAKE AN APPOINTMENT WITH DR. Bret Jeffrey (Patient taking differently: Take 5 mg by mouth two (2) times a day.) 60 Tab 1    [DISCONTINUED] acetaminophen (TYLENOL) 325 mg tablet Take  by mouth every four (4) hours as needed for Pain.  [DISCONTINUED] dilTIAZem CD (CARDIZEM CD) 120 mg ER capsule Take 1 Cap by mouth daily. 30 Cap 11    [DISCONTINUED] oxyCODONE IR (ROXICODONE) 5 mg immediate release tablet Take 1 Tab by mouth every three (3) hours as needed. Max Daily Amount: 40 mg. 60 Tab 0    [DISCONTINUED] multivitamin (ONE A DAY) tablet Take 1 Tab by mouth daily.  [DISCONTINUED] docosahexanoic acid/epa (FISH OIL PO) Take 1,000 mg by mouth daily. Past History     Past Medical History:  Past Medical History:   Diagnosis Date    Arthritis     At[facility] fibrillation ([facility]     CAD (coronary artery disease)     lipids    Chronic obstructive pulmonary disease (HCC)     Chronic pain     Diabetes ([facility]     Hypertension     Infectious disease        Past Surgical History:  Past Surgical History:   Procedure Laterality Date    HX HIP REPLACEMENT Right 02/11/2019    Acetabular bone graft    HX TONSILLECTOMY         Family History:  Family History   Problem Relation Age of Onset    Heart Disease Mother     Heart Disease Father     Alzheimer Father     Heart Disease Brother        Social History:  Social History     Tobacco Use    Smoking status: Current Every Day Smoker     Packs/day: 1.50     Years: 30.00     Pack years: 45.00    Smokeless tobacco: Never Used    Tobacco comment: 2 ppd for years/cutting back   Substance Use Topics    Alcohol use:  Yes     Alcohol/week: 4.0 standard drinks     Types: 4 Cans of beer per week     Comment: weekends    Drug use: No       Allergies:  No Known Allergies      Review of Systems   no fever  no eye pain  no ear pain  no shortness of breath  no chest pain  no abdominal pain  no dysuria  no leg pain  no rash  no lymphadenopathy  no weight loss    Physical Exam   Physical Exam  Constitutional:       Appearance: Normal appearance. HENT:      Head: Normocephalic. Nose: Nose normal.      Mouth/Throat:      Mouth: Mucous membranes are moist.   Eyes:      Extraocular Movements: Extraocular movements intact. Comments: Conjunctiva are pale   Neck:      Musculoskeletal: Neck supple. Cardiovascular:      Rate and Rhythm: Normal rate and regular rhythm. Pulmonary:      Effort: Pulmonary effort is normal.   Abdominal:      Palpations: Abdomen is soft. Tenderness: There is no abdominal tenderness. Genitourinary:     Comments: Rectal exam shows brown stool, no tenderness or masses  Musculoskeletal:         General: No swelling or tenderness. Skin:     General: Skin is warm and dry. Neurological:      General: No focal deficit present. Mental Status: He is alert and oriented to person, place, and time. Psychiatric:         Mood and Affect: Mood normal.         Diagnostic Study Results     Labs -     Recent Results (from the past 24 hour(s))   CBC WITH AUTOMATED DIFF    Collection Time: 10/08/20  9:50 AM   Result Value Ref Range    WBC 6.9 4.1 - 11.1 K/uL    RBC 2.29 (L) 4.10 - 5.70 M/uL    HGB 6.6 (L) 12.1 - 17.0 g/dL    HCT 21.9 (L) 36.6 - 50.3 %    MCV 95.6 80.0 - 99.0 FL    MCH 28.8 26.0 - 34.0 PG    MCHC 30.1 30.0 - 36.5 g/dL    RDW 19.5 (H) 11.5 - 14.5 %    PLATELET 494 115 - 463 K/uL    MPV 11.1 8.9 - 12.9 FL    NRBC 0.0 0  WBC    ABSOLUTE NRBC 0.00 0.00 - 0.01 K/uL    NEUTROPHILS 76 (H) 32 - 75 %    LYMPHOCYTES 13 12 - 49 %    MONOCYTES 10 5 - 13 %    EOSINOPHILS 0 0 - 7 %    BASOPHILS 0 0 - 1 %    IMMATURE GRANULOCYTES 1 (H) 0.0 - 0.5 %    ABS. NEUTROPHILS 5.2 1.8 - 8.0 K/UL    ABS. LYMPHOCYTES 0.9 0.8 - 3.5 K/UL    ABS. MONOCYTES 0.7 0.0 - 1.0 K/UL    ABS. EOSINOPHILS 0.0 0.0 - 0.4 K/UL    ABS. BASOPHILS 0.0 0.0 - 0.1 K/UL    ABS. IMM. GRANS. 0.1 (H) 0.00 - 0.04 K/UL    DF SMEAR SCANNED      RBC COMMENTS ANISOCYTOSIS  1+        RBC COMMENTS MICROCYTOSIS  PRESENT        RBC COMMENTS HYPOCHROMIA  1+        RBC COMMENTS POLYCHROMASIA  PRESENT       METABOLIC PANEL, COMPREHENSIVE    Collection Time: 10/08/20  9:50 AM   Result Value Ref Range    Sodium 139 136 - 145 mmol/L    Potassium 4.6 3.5 - 5.1 mmol/L    Chloride 111 (H) 97 - 108 mmol/L    CO2 23 21 - 32 mmol/L    Anion gap 5 5 - 15 mmol/L    Glucose 97 65 - 100 mg/dL    BUN 22 (H) 6 - 20 MG/DL    Creatinine 1.43 (H) 0.70 - 1.30 MG/DL    BUN/Creatinine ratio 15 12 - 20      GFR est AA >60 >60 ml/min/1.73m2    GFR est non-AA 50 (L) >60 ml/min/1.73m2    Calcium 8.4 (L) 8.5 - 10.1 MG/DL    Bilirubin, total 0.3 0.2 - 1.0 MG/DL    ALT (SGPT) 18 12 - 78 U/L    AST (SGOT) 15 15 - 37 U/L    Alk. phosphatase 134 (H) 45 - 117 U/L    Protein, total 8.1 6.4 - 8.2 g/dL    Albumin 3.2 (L) 3.5 - 5.0 g/dL    Globulin 4.9 (H) 2.0 - 4.0 g/dL    A-G Ratio 0.7 (L) 1.1 - 2.2     TYPE & SCREEN    Collection Time: 10/08/20  9:50 AM   Result Value Ref Range    Crossmatch Expiration 10/11/2020     ABO/Rh(D) A POSITIVE     Antibody screen NEG     Unit number [id number]     Blood component type RC LR     Unit division 00     Status of unit ISSUED     Crossmatch result Compatible    RBC, ALLOCATE    Collection Time: 10/08/20 11:15 AM   Result Value Ref Range    HISTORY CHECKED?  Historical check performed    OCCULT BLOOD, STOOL    Collection Time: 10/08/20 11:28 AM   Result Value Ref Range    Occult blood, stool Negative NEG     URINALYSIS W/ RFLX MICROSCOPIC    Collection Time: 10/08/20 11:56 AM   Result Value Ref Range    Color YELLOW/STRAW      Appearance CLEAR CLEAR      Specific gravity 1.008 1.003 - 1.030      pH (UA) 6.0 5.0 - 8.0      Protein TRACE (A) NEG mg/dL    Glucose Negative NEG mg/dL    Ketone Negative NEG mg/dL    Bilirubin Negative NEG      Blood SMALL (A) NEG      Urobilinogen 0.2 0.2 - 1.0 EU/dL    Nitrites Negative NEG      Leukocyte Esterase Negative NEG      WBC 0-4 0 - 4 /hpf    RBC 5-10 0 - 5 /hpf    Epithelial cells FEW FEW /lpf    Bacteria Negative NEG /hpf    Hyaline cast 0-2 0 - 5 /lpf       Radiologic Studies -   No orders to display     CT Results  (Last 48 hours)    None        CXR Results  (Last 48 hours)    None            Medical Decision Making   I am the first provider for this patient. I reviewed the vital signs, available nursing notes, past medical history, past surgical history, family history and social history. Vital Signs-Reviewed the patient's vital signs. Patient Vitals for the past 24 hrs:   Temp Pulse Resp BP SpO2   10/08/20 1315 98.4 °F (36.9 °C) 62 16 137/67 97 %   10/08/20 1245 98.7 °F (37.1 °C) 66 15 (!) 131/58 96 %   10/08/20 1230 98.6 °F (37 °C) 66 17 124/87 96 %   10/08/20 1215 98.6 °F (37 °C) 63 18 126/60 97 %   10/08/20 1159 98.8 °F (37.1 °C) 68 15 (!) 147/53 100 %   10/08/20 1142 98.5 °F (36.9 °C) 70 18 (!) 143/56 99 %   10/08/20 1003 -- -- -- -- 97 %   10/08/20 0908 98.3 °F (36.8 °C) 78 18 (!) 152/55 97 %         Provider Notes (Medical Decision Making):   79-year-old presented with anemia. Concern for GI bleed, no symptoms of hematuria, he is also on Eliquis raising more concern for active bleeding. Vitals are currently unremarkable with a benign abdominal exam.    ED Course:     Initial assessment performed. The patients presenting problems have been discussed, and they are in agreement with the care plan formulated and outlined with them. I have encouraged them to ask questions as they arise throughout their visit. UA shows small blood, not suggestive of infection. CBC shows anemia with hemoglobin of 6.6, prior was 9.2. Basic metabolic panel shows creatinine slightly elevated 1.43, normal potassium, no transaminitis.   He is consented for 1 unit packed red blood cells, and will be admitted. Critical Care Time:         Disposition:  Admit    PLAN:  1. Current Discharge Medication List        2.    Follow-up Information    None       Return to ED if worse     Diagnosis     Clinical Impression: Acute anemia on Eliquis

## 2020-10-08 NOTE — PROGRESS NOTES
Pharmacy Clarification of Prior to Admission Medication Regimen     The patient was interviewed regarding clarification of the prior to admission medication regimen. Patient was questioned regarding use of any other inhalers, topical products, over the counter medications, herbal medications, vitamin products or ophthalmic/nasal/otic medication use. Information Obtained From: maurilio staton    Pertinent Pharmacy Findings:  Updated patients preferred outpatient pharmacy to: [facility]  Identified High Alert Medication Information  Current Anticoagulants:  Name: eliquis      PTA medication list was corrected to the following:     Prior to Admission Medications   Prescriptions Last Dose Informant Taking?   amiodarone (CORDARONE) 200 mg tablet 10/7/2020 at Unknown time Self Yes   Sig: Take 1 Tab by mouth daily. apixaban (Eliquis) 5 mg tablet 10/7/2020 at Unknown time Self Yes   Sig: Take 5 mg by mouth two (2) times a day. aspirin delayed-release 81 mg tablet 10/7/2020 at Unknown time Self Yes   Sig: Take 81 mg by mouth daily. atorvastatin (LIPITOR) 20 mg tablet 10/7/2020 at Unknown time Self Yes   Sig: Take 20 mg by mouth daily. docosahexaenoic acid/epa (FISH OIL PO) 10/7/2020 at Unknown time Self Yes   Sig: Take 1 Tab by mouth daily. famotidine (PEPCID) 40 mg tablet 10/7/2020 at Unknown time Self Yes   Sig: Take 40 mg by mouth daily. gemfibrozil (LOPID) 600 mg tablet 10/7/2020 at Unknown time Self Yes   Sig: Take 600 mg by mouth two (2) times a day. lisinopril-hydroCHLOROthiazide (PRINZIDE, ZESTORETIC) 20-12.5 mg per tablet 10/7/2020 at Unknown time Self Yes   Sig: Take 1 Tab by mouth two (2) times a day. metFORMIN (GLUCOPHAGE) 1,000 mg tablet 10/7/2020 at Unknown time Self Yes   Sig: Take 1,000 mg by mouth two (2) times a day. multivitamin (ONE A DAY) tablet 10/7/2020 at Unknown time Self Yes   Sig: Take 1 Tab by mouth daily.    pantoprazole (PROTONIX) 40 mg tablet 10/7/2020 at Unknown time Self Yes   Sig: Take 40 mg by mouth daily. senna-docusate (PERICOLACE) 8.6-50 mg per tablet 10/7/2020 at Unknown time Self Yes   Sig: Take 1 Tab by mouth daily.       Facility-Administered Medications: None          Thank you,  Margarito Willis [facility]  Medication History Pharmacy Technician

## 2020-10-08 NOTE — ED TRIAGE NOTES
Assumed care of pt from triage. Pt is A&O x 4. Pt was referred here from his PCP for a low blood count and worried that pt may need a blood transfusion. Pt denies dizziness, SOB, tired, and says he feels like himself. Pt is on Eliquis. Pt denies any black tarry stools. Pt is in the hernandez bed at this time. Dr. Riley Simms at bedside to evaluate pt.

## 2020-10-08 NOTE — CONSULTS
GI CONSULTATION NOTE  Rudy Osler, JAZZ  [phone] NP in-hospital cell phone M-F until 4:30  After 5pm or on weekends, please call  for physician on call    NAME: Moise Collier   :  1955   MRN:  [id number]   Attending:  Dr. Riley Simms  Primary GI:  Dr. Casey Moran; Dr. Alejandra Parker following   Date/Time:  10/8/2020 12:35 PM  Assessment:   Acute anemia  History of iron deficiency anemia   · Patient presented to ED after having routine blood work done by PCP. Patient was told that his blood levels were low and that he should go to ED. Patient denies hematochezia/melena and hematemesis. He has no complaints at this time. · Patient had EGD and colonsocopy in 2019 with Dr. Binu Mccall for MANUEL. He is not taking an iron supplement. · Hgb 6.6; PRBC infusing  · FOBT negative     Atrial fibrillation   · On Eliquis- held for now     Plan:   1. Serial H&H; goal for hgb >7.0  2. Monitor for s/s of bleeding   3. Conservative management at this time. No indication for EGD/colonoscopy now. Patient had negative colonoscopy, EGD and capsule study in the last year. No obvious source of bleeding. Plan discussed with Dr. Alejandra Parker  Subjective:     HISTORY OF PRESENT ILLNESS:     Moise Collier is an 60 y.o.  male with acute anemia. Patient reports that he went to his primary care physician for routine visit, was found to have low blood counts and was sent to the ER for further management and evaluation. Patient in the ER was found to have a hemoglobin of 6.6, had a unit of PRBC ordered and was requested to be observed under the hospitalist service. Patient reports that he has history of atrial fibrillation and takes Eliquis on a daily basis. Patient denies any obvious source of bleeding, denies any history of peptic ulcer disease, denies any other concerns or problems.   The patient denies any headache, blurry vision, sore throat, trouble swallowing, trouble with speech, chest pain, SOB, cough, fever, chills, N/V/D, abd pain, urinary symptoms, constipation, recent travels, sick contacts, focal or generalized neurological symptoms,  falls, injuries, rashes, contact with COVID-19 diagnosed patients, hematemesis, melena, hemoptysis, hematuria, rashes, denies starting any new medications and denies any other concerns or problems besides as mentioned above. Past Medical History:   Diagnosis Date    Arthritis     Atrial fibrillation ([facility] [address]     CAD (coronary artery disease)     lipids    Chronic obstructive pulmonary disease (HCC)     Chronic pain     Diabetes ([facility] [address]     Hypertension     Infectious disease       Past Surgical History:   Procedure Laterality Date    HX HIP REPLACEMENT Right 02/11/2019    Acetabular bone graft    HX TONSILLECTOMY       Social History     Tobacco Use    Smoking status: Current Every Day Smoker     Packs/day: 1.50     Years: 30.00     Pack years: 45.00    Smokeless tobacco: Never Used    Tobacco comment: 2 ppd for years/cutting back   Substance Use Topics    Alcohol use: Yes     Alcohol/week: 4.0 standard drinks     Types: 4 Cans of beer per week     Comment: weekends      Family History   Problem Relation Age of Onset    Heart Disease Mother     Heart Disease Father     Alzheimer Father     Heart Disease Brother       No Known Allergies   Prior to Admission medications    Medication Sig Start Date End Date Taking? Authorizing Provider   famotidine (PEPCID) 40 mg tablet Take 40 mg by mouth daily. Yes Jasiel, MD Patrice   pantoprazole (PROTONIX) 40 mg tablet Take 40 mg by mouth daily. Yes Jasiel, MD Patrice   Eliquis 5 mg tablet T1T PO EVERY 12 HOURS - PLEASE CALL AND MAKE AN APPOINTMENT WITH DR. Neal Bacon 6/29/20  Yes Jessica SARGENT NP   amiodarone (CORDARONE) 200 mg tablet Take 1 Tab by mouth daily. 2/26/19  Yes Caroline Watson MD   senna-docusate (PERICOLACE) 8.6-50 mg per tablet Take 1 Tab by mouth daily.  2/12/19  Yes Bernabe Smith NP lisinopril-hydroCHLOROthiazide (PRINZIDE, ZESTORETIC) 20-12.5 mg per tablet Take 1 Tab by mouth two (2) times a day. Yes Provider, Historical   atorvastatin (LIPITOR) 20 mg tablet Take 20 mg by mouth daily. Yes Provider, Historical   gemfibrozil (LOPID) 600 mg tablet Take  by mouth two (2) times a day. Yes Other, MD Patrice   METFORMIN HCL (METFORMIN PO) Take 1,000 mg by mouth two (2) times a day. Yes Other, MD Patrice       Patient Active Problem List   Diagnosis Code    History of total right hip replacement Z96.641    At[facility] fibrillation ([facility] I48.91    Periprosthetic fracture around internal prosthetic hip joint M97. 8XXA, Z96.649    A-fib ([facility] I48.91    Other fracture of right femur, initial encounter for closed fracture ([facility] S72.8X1A       REVIEW OF SYSTEMS:    Constitutional: negative fever, negative chills, negative weight loss  Eyes:   negative visual changes  ENT:   negative sore throat, tongue or lip swelling   Respiratory:  negative cough, negative dyspnea  Cards:  negative for chest pain, palpitations, lower extremity edema  GI:   See HPI  :  negative for frequency, dysuria  Integument:  negative for rash and pruritus  Heme:  negative for easy bruising and gum/nose bleeding  Musculoskel: negative for myalgias,  back pain and muscle weakness  Neuro: negative for headaches, dizziness, vertigo  Psych: negative for feelings of anxiety, depression     Objective:   VITALS:    Visit Vitals  /60   Pulse 63   Temp 98.6 °F (37 °C)   Resp 18   Ht 5' 6\" (1.676 m)   Wt 86.2 kg (190 lb 0.6 oz)   SpO2 97%   BMI 30.67 kg/m²       PHYSICAL EXAM:   General:           Pleasant elderly  male. NAD  Head:               Normocephalic, without obvious abnormality, atraumatic. Eyes:               Conjunctivae clear and pale, anicteric sclerae. Pupils are equal  Nose:               Nares normal. No drainage or sinus tenderness.   Throat:             Lips, mucosa, and tongue normal.  No Thrush  Neck: Supple, symmetrical,  no adenopathy, thyroid: non tender  Back:               Symmetric,  No CVA tenderness. Lungs:             CTA bilaterally. No wheezing/rhonchi/rales. Chest wall:      No tenderness or deformity. No Accessory muscle use. Heart:              Regular rate and rhythm,  no murmur, rub or gallop. Abdomen:        Soft, non-tender. Not distended. Bowel sounds normal. No masses  Extremities:     Atraumatic, No cyanosis. No edema. No clubbing  Skin:                Texture, turgor normal. No rashes/lesions/jaundice  Psych:             Good insight. Not depressed. Not anxious or agitated. Neurologic:      EOMs intact. No facial asymmetry. No aphasia or slurred speech. Normal                        strength, A/O X 3. LAB DATA REVIEWED:    Recent Results (from the past 24 hour(s))   CBC WITH AUTOMATED DIFF    Collection Time: 10/08/20  9:50 AM   Result Value Ref Range    WBC 6.9 4.1 - 11.1 K/uL    RBC 2.29 (L) 4.10 - 5.70 M/uL    HGB 6.6 (L) 12.1 - 17.0 g/dL    HCT 21.9 (L) 36.6 - 50.3 %    MCV 95.6 80.0 - 99.0 FL    MCH 28.8 26.0 - 34.0 PG    MCHC 30.1 30.0 - 36.5 g/dL    RDW 19.5 (H) 11.5 - 14.5 %    PLATELET 977 078 - 287 K/uL    MPV 11.1 8.9 - 12.9 FL    NRBC 0.0 0  WBC    ABSOLUTE NRBC 0.00 0.00 - 0.01 K/uL    NEUTROPHILS 76 (H) 32 - 75 %    LYMPHOCYTES 13 12 - 49 %    MONOCYTES 10 5 - 13 %    EOSINOPHILS 0 0 - 7 %    BASOPHILS 0 0 - 1 %    IMMATURE GRANULOCYTES 1 (H) 0.0 - 0.5 %    ABS. NEUTROPHILS 5.2 1.8 - 8.0 K/UL    ABS. LYMPHOCYTES 0.9 0.8 - 3.5 K/UL    ABS. MONOCYTES 0.7 0.0 - 1.0 K/UL    ABS. EOSINOPHILS 0.0 0.0 - 0.4 K/UL    ABS. BASOPHILS 0.0 0.0 - 0.1 K/UL    ABS. IMM.  GRANS. 0.1 (H) 0.00 - 0.04 K/UL    DF SMEAR SCANNED      RBC COMMENTS ANISOCYTOSIS  1+        RBC COMMENTS MICROCYTOSIS  PRESENT        RBC COMMENTS HYPOCHROMIA  1+        RBC COMMENTS POLYCHROMASIA  PRESENT       METABOLIC PANEL, COMPREHENSIVE    Collection Time: 10/08/20  9:50 AM   Result Value Ref Range    Sodium 139 136 - 145 mmol/L    Potassium 4.6 3.5 - 5.1 mmol/L    Chloride 111 (H) 97 - 108 mmol/L    CO2 23 21 - 32 mmol/L    Anion gap 5 5 - 15 mmol/L    Glucose 97 65 - 100 mg/dL    BUN 22 (H) 6 - 20 MG/DL    Creatinine 1.43 (H) 0.70 - 1.30 MG/DL    BUN/Creatinine ratio 15 12 - 20      GFR est AA >60 >60 ml/min/1.73m2    GFR est non-AA 50 (L) >60 ml/min/1.73m2    Calcium 8.4 (L) 8.5 - 10.1 MG/DL    Bilirubin, total 0.3 0.2 - 1.0 MG/DL    ALT (SGPT) 18 12 - 78 U/L    AST (SGOT) 15 15 - 37 U/L    Alk. phosphatase 134 (H) 45 - 117 U/L    Protein, total 8.1 6.4 - 8.2 g/dL    Albumin 3.2 (L) 3.5 - 5.0 g/dL    Globulin 4.9 (H) 2.0 - 4.0 g/dL    A-G Ratio 0.7 (L) 1.1 - 2.2     TYPE & SCREEN    Collection Time: 10/08/20  9:50 AM   Result Value Ref Range    Crossmatch Expiration 10/11/2020     ABO/Rh(D) A POSITIVE     Antibody screen NEG     Unit number [id number]     Blood component type [facility]     Unit division 00     Status of unit ISSUED     Crossmatch result Compatible    RBC, ALLOCATE    Collection Time: 10/08/20 11:15 AM   Result Value Ref Range    HISTORY CHECKED?  Historical check performed    OCCULT BLOOD, STOOL    Collection Time: 10/08/20 11:28 AM   Result Value Ref Range    Occult blood, stool Negative NEG     URINALYSIS W/ RFLX MICROSCOPIC    Collection Time: 10/08/20 11:56 AM   Result Value Ref Range    Color YELLOW/STRAW      Appearance CLEAR CLEAR      Specific gravity 1.008 1.003 - 1.030      pH (UA) 6.0 5.0 - 8.0      Protein TRACE (A) NEG mg/dL    Glucose Negative NEG mg/dL    Ketone Negative NEG mg/dL    Bilirubin Negative NEG      Blood SMALL (A) NEG      Urobilinogen 0.2 0.2 - 1.0 EU/dL    Nitrites Negative NEG      Leukocyte Esterase Negative NEG      WBC 0-4 0 - 4 /hpf    RBC 5-10 0 - 5 /hpf    Epithelial cells FEW FEW /lpf    Bacteria Negative NEG /hpf    Hyaline cast 0-2 0 - 5 /lpf       IMAGING RESULTS:  I have personally reviewed the imaging reports      Total time spent with patient: 50 minutes ________________________________________________________________________  Care Plan discussed with:  Patient    Family     RN               Consultant:       CT  10/8/2020:  ________________________________________________________________________    ___________________________________________________  Consulting Provider: Elizabeth Barnett NP      10/8/2020  12:35 PM

## 2020-10-09 VITALS
OXYGEN SATURATION: 99 % | SYSTOLIC BLOOD PRESSURE: 138 MMHG | DIASTOLIC BLOOD PRESSURE: 64 MMHG | TEMPERATURE: 98.3 F | RESPIRATION RATE: 17 BRPM | HEIGHT: 66 IN | HEART RATE: 67 BPM | BODY MASS INDEX: 30.54 KG/M2 | WEIGHT: 190.04 LBS

## 2020-10-09 PROBLEM — D64.9 ANEMIA: Status: ACTIVE | Noted: 2020-10-09

## 2020-10-09 LAB
ABO + RH BLD: NORMAL
ALBUMIN SERPL-MCNC: 2.9 G/DL (ref 3.5–5)
ALBUMIN/GLOB SERPL: 0.6 {RATIO} (ref 1.1–2.2)
ALP SERPL-CCNC: 123 U/L (ref 45–117)
ALT SERPL-CCNC: 17 U/L (ref 12–78)
ANION GAP SERPL CALC-SCNC: 5 MMOL/L (ref 5–15)
AST SERPL-CCNC: 14 U/L (ref 15–37)
BILIRUB SERPL-MCNC: 0.7 MG/DL (ref 0.2–1)
BLD PROD TYP BPU: NORMAL
BLD PROD TYP BPU: NORMAL
BLOOD GROUP ANTIBODIES SERPL: NORMAL
BPU ID: NORMAL
BPU ID: NORMAL
BUN SERPL-MCNC: 17 MG/DL (ref 6–20)
BUN/CREAT SERPL: 14 (ref 12–20)
CALCIUM SERPL-MCNC: 8.3 MG/DL (ref 8.5–10.1)
CHLORIDE SERPL-SCNC: 110 MMOL/L (ref 97–108)
CO2 SERPL-SCNC: 23 MMOL/L (ref 21–32)
CREAT SERPL-MCNC: 1.22 MG/DL (ref 0.7–1.3)
CROSSMATCH RESULT,%XM: NORMAL
CROSSMATCH RESULT,%XM: NORMAL
EST. AVERAGE GLUCOSE BLD GHB EST-MCNC: 105 MG/DL
GLOBULIN SER CALC-MCNC: 4.6 G/DL (ref 2–4)
GLUCOSE BLD STRIP.AUTO-MCNC: 93 MG/DL (ref 65–100)
GLUCOSE SERPL-MCNC: 78 MG/DL (ref 65–100)
HBA1C MFR BLD: 5.3 % (ref 4–5.6)
HGB BLD-MCNC: 7.7 G/DL (ref 12.1–17)
POTASSIUM SERPL-SCNC: 4.2 MMOL/L (ref 3.5–5.1)
PROT SERPL-MCNC: 7.5 G/DL (ref 6.4–8.2)
SERVICE CMNT-IMP: NORMAL
SODIUM SERPL-SCNC: 138 MMOL/L (ref 136–145)
SPECIMEN EXP DATE BLD: NORMAL
STATUS OF UNIT,%ST: NORMAL
STATUS OF UNIT,%ST: NORMAL
UNIT DIVISION, %UDIV: 0
UNIT DIVISION, %UDIV: 0

## 2020-10-09 PROCEDURE — 90471 IMMUNIZATION ADMIN: CPT

## 2020-10-09 PROCEDURE — 80053 COMPREHEN METABOLIC PANEL: CPT

## 2020-10-09 PROCEDURE — 99218 HC RM OBSERVATION: CPT

## 2020-10-09 PROCEDURE — C9113 INJ PANTOPRAZOLE SODIUM, VIA: HCPCS | Performed by: FAMILY MEDICINE

## 2020-10-09 PROCEDURE — 96376 TX/PRO/DX INJ SAME DRUG ADON: CPT

## 2020-10-09 PROCEDURE — 90686 IIV4 VACC NO PRSV 0.5 ML IM: CPT | Performed by: INTERNAL MEDICINE

## 2020-10-09 PROCEDURE — 82962 GLUCOSE BLOOD TEST: CPT

## 2020-10-09 PROCEDURE — 36415 COLL VENOUS BLD VENIPUNCTURE: CPT

## 2020-10-09 PROCEDURE — 85018 HEMOGLOBIN: CPT

## 2020-10-09 PROCEDURE — 74011250637 HC RX REV CODE- 250/637: Performed by: HOSPITALIST

## 2020-10-09 PROCEDURE — 74011250636 HC RX REV CODE- 250/636: Performed by: FAMILY MEDICINE

## 2020-10-09 PROCEDURE — 74011000250 HC RX REV CODE- 250: Performed by: FAMILY MEDICINE

## 2020-10-09 PROCEDURE — 74011250636 HC RX REV CODE- 250/636: Performed by: INTERNAL MEDICINE

## 2020-10-09 PROCEDURE — 74011250637 HC RX REV CODE- 250/637: Performed by: FAMILY MEDICINE

## 2020-10-09 RX ORDER — ASPIRIN 81 MG/1
81 TABLET ORAL DAILY
Status: DISCONTINUED | OUTPATIENT
Start: 2020-10-09 | End: 2020-10-09 | Stop reason: HOSPADM

## 2020-10-09 RX ORDER — PANTOPRAZOLE SODIUM 40 MG/1
40 TABLET, DELAYED RELEASE ORAL DAILY
Status: DISCONTINUED | OUTPATIENT
Start: 2020-10-09 | End: 2020-10-09 | Stop reason: HOSPADM

## 2020-10-09 RX ADMIN — Medication 10 ML: at 06:20

## 2020-10-09 RX ADMIN — ASPIRIN 81 MG: 81 TABLET, COATED ORAL at 10:45

## 2020-10-09 RX ADMIN — AMIODARONE HYDROCHLORIDE 200 MG: 200 TABLET ORAL at 08:20

## 2020-10-09 RX ADMIN — APIXABAN 5 MG: 5 TABLET, FILM COATED ORAL at 10:45

## 2020-10-09 RX ADMIN — SODIUM CHLORIDE 40 MG: 9 INJECTION, SOLUTION INTRAMUSCULAR; INTRAVENOUS; SUBCUTANEOUS at 08:21

## 2020-10-09 RX ADMIN — ATORVASTATIN CALCIUM 20 MG: 20 TABLET, FILM COATED ORAL at 08:21

## 2020-10-09 RX ADMIN — GEMFIBROZIL 600 MG: 600 TABLET ORAL at 08:21

## 2020-10-09 RX ADMIN — INFLUENZA VIRUS VACCINE 0.5 ML: 15; 15; 15; 15 SUSPENSION INTRAMUSCULAR at 11:01

## 2020-10-09 NOTE — PROGRESS NOTES
General Surgery End of Shift Nursing Note    Bedside shift change report given to Ambrosio Jackson (oncoming nurse) by Vel Mckinney RN (offgoing nurse). Report included the following information SBAR, Kardex and MAR. Shift worked:   [id number]   Summary of shift:  Patient was received to floor and oriented to room. He had no complaints of pain and remained NPO for shift. Blood transfusion was started per MD order and I stayed with patient for the first 15 minutes and the transfusion was being well tolerated. PM nurse informed of transfusion and when the next set of vitals were due.     Issues for physician to address:  None      Number times ambulated in hallway past shift: 0    Number of times OOB to chair past shift: 0    Pain Management:  Current medication: See MAR   Patient states pain is manageable on current pain medication: YES    GI:    Current diet:  DIET NPO Except Meds    Tolerating current diet: YES  Passing flatus: YES  Last Bowel Movement: today    Patient Safety:    Falls Score: 2    Jac Cook RN

## 2020-10-09 NOTE — PROGRESS NOTES
General Surgery End of Shift Nursing Note    Bedside shift change report given to Erika Pierson (oncoming nurse) by Ela Nunez (offgoing nurse). Report included the following information SBAR, Kardex, Intake/Output, MAR and Recent Results. Shift worked:   7p-7a   Summary of shift:    Pt completed blood transfusion without any adverse reaction. Last Hgb came back 7.7. Pt has not stated any pain. No BM. Pt has been NPO. Issues for physician to address:   None     Number times ambulated in hallway past shift: 0    Number of times OOB to chair past shift: 0    Pain Management:  Current medication: Not pain stated  Patient states pain is manageable on current pain medication: YES    GI:    Current diet:  DIET NPO Except Meds    Tolerating current diet: YES  Passing flatus: YES  Last Bowel Movement: yesterday   Appearance: Not visualized    Respiratory:    Incentive Spirometer at bedside: YES  Patient instructed on use: YES    Patient Safety:    Falls Score: 1  Bed Alarm On? Not applicable  Sitter?  Not applicable    Jenelle Segundo

## 2020-10-09 NOTE — DISCHARGE INSTRUCTIONS
Discharge Instructions       PATIENT ID: Glenn Ramirez  MRN: [id number]   YOB: 1955    DATE OF ADMISSION: 10/8/2020  9:18 AM    DATE OF DISCHARGE: 10/9/2020    PRIMARY CARE PROVIDER: Zhang Motta MD     ATTENDING PHYSICIAN: Jenny Elias MD  DISCHARGING PROVIDER: Abel Mclain MD    To contact this individual call [phone] and ask the  to page. If unavailable ask to be transferred the Adult Hospitalist Department. DISCHARGE DIAGNOSES   Anemia    CONSULTATIONS: IP CONSULT TO GASTROENTEROLOGY  IP CONSULT TO HOSPITALIST    PROCEDURES/SURGERIES: * No surgery found *    PENDING TEST RESULTS:   At the time of discharge the following test results are still pending: none    FOLLOW UP APPOINTMENTS:   Follow-up Information     Follow up With Specialties Details Why Contact Info    Zhang Motta MD Family Medicine In 1 week  [address]  [address] [phone], Wily Warner MD Gastroenterology In 1 week  [facility]  [phone]             ADDITIONAL CARE RECOMMENDATIONS:   Follow up with PMD  Follow up with GI  Please expect a blood work  (CBC, BMP) when you see your PMD    DIET: Diabetic Diet    ACTIVITY: Activity as tolerated      DISCHARGE MEDICATIONS:   See Medication Reconciliation Form    · It is important that you take the medication exactly as they are prescribed. · Keep your medication in the bottles provided by the pharmacist and keep a list of the medication names, dosages, and times to be taken in your wallet. · Do not take other medications without consulting your doctor. NOTIFY YOUR PHYSICIAN FOR ANY OF THE FOLLOWING:   Fever over 101 degrees for 24 hours. Chest pain, shortness of breath, fever, chills, nausea, vomiting, diarrhea, change in mentation, falling, weakness, bleeding. Severe pain or pain not relieved by medications.   Or, any other signs or symptoms that you may have questions about.       DISPOSITION:  x  Home With:   OT  PT  HH  RN       SNF/Inpatient Rehab/LTAC    Independent/assisted living    Hospice    Other:     CDMP Checked:   Yes x     PROBLEM LIST Updated:  Yes x       Signed:   Carroll Liu MD  10/9/2020  8:39 AM

## 2020-10-09 NOTE — PROGRESS NOTES
[facility] Adult  Hospitalist Group                                                                                          Hospitalist Progress Note  Leon Davis MD  Answering service: [phone] OR [phone] from in house phone        Date of Service:  10/9/2020  NAME:  Amy Goel  :  1955  MRN:  [id number]      Admission Summary:   Amy Goel is a 60 y.o. male who presents with acute anemia     History was primarily obtained from the patient     Patient reports that he went to his primary care physician for routine visit today, was found to have low blood counts and was sent to the ER for further management and evaluation. Patient in the ER was found to have a hemoglobin of 6.6, had a unit of PRBC ordered and was requested to be observed under the hospitalist service. Patient reports that he has history of atrial fibrillation and takes Eliquis on a daily basis. Patient denies any obvious source of bleeding, denies any history of peptic ulcer disease, denies any other concerns or problems.     The patient denies any Headache, blurry vision, sore throat, trouble swallowing, trouble with speech, chest pain, SOB, cough, fever, chills, N/V/D, abd pain, urinary symptoms, constipation, recent travels, sick contacts, focal or generalized neurological symptoms,  falls, injuries, rashes, contact with COVID-19 diagnosed patients, hematemesis, melena, hemoptysis, hematuria, rashes, denies starting any new medications and denies any other concerns or problems besides as mentioned above.     Interval history / Subjective:     F/u anemia     Assessment & Plan:     Acute on chronic anemia:, iron deficiency  -Unclear etiology with extensive work up last year with EGD/Colonoscopy/Capsule  -FOBT negative  -s/p 2 units PRBC 10/8  -Protonix BID  -Appreciate GI, no plans for repeat procedures in the hospital, outpatient follow up  -Appreciate discussion with Dr Varsha Butcher, restart home meds and discharge the patient     Persistent Atrial fibrillation  -Eliquis being held, will see if GI ok to restart  -On Amiodarone     JUDAH on CKD  -On gentle iV fluids, improved  -Will stop fluids     DM type 2  -on SSI     Hypertension  -stable on home meds     NPO  -Will put the patient on Diabetic diet    Code status: FULL CODE  DVT prophylaxis: scd  PTA: home  Baseline; Independent    Plan: Discharge to home     Care Plan discussed with: Patient/Family  Anticipated Disposition: Home w/Family  Anticipated Discharge: Less than 24 hours     Hospital Problems  Date Reviewed: 2/27/2019          Codes Class Noted POA    GI bleed ICD-10-CM: K92.2  ICD-9-CM: 578.9  10/8/2020 Unknown                Review of Systems:   A comprehensive review of systems was negative except for that written in the HPI. Vital Signs:    Last 24hrs VS reviewed since prior progress note. Most recent are:  Visit Vitals  BP (!) 147/60   Pulse 67   Temp 98.6 °F (37 °C)   Resp 16   Ht 5' 6\" (1.676 m)   Wt 86.2 kg (190 lb 0.6 oz)   SpO2 98%   BMI 30.67 kg/m²         Intake/Output Summary (Last 24 hours) at 10/9/2020 2166  Last data filed at 10/9/2020 0553  Gross per 24 hour   Intake 419.55 ml   Output 1450 ml   Net -1030.45 ml        Physical Examination:             Constitutional:  No acute distress, cooperative, pleasant    ENT:  Oral mucosa moist, oropharynx benign. Resp:  CTA bilaterally. No wheezing/rhonchi/rales. No accessory muscle use   CV:  irregularly irregular rhythm, normal rate, no murmurs, gallops, rubs    GI:  Soft, non distended, non tender. normoactive bowel sounds, no hepatosplenomegaly     Musculoskeletal:  No edema, warm, 2+ pulses throughout    Neurologic:  Moves all extremities.   AAOx3, CN II-XII reviewed     Skin:  Good turgor, no rashes or ulcers       Data Review:    Review and/or order of clinical lab test      Labs:     Recent Labs     10/09/20  0651 10/08/20  2319  10/08/20  0950   WBC  --   --   --  6.9   HGB 7.7* 7.8*   < > 6.6* HCT  --   --   --  21.9*   PLT  --   --   --  184    < > = values in this interval not displayed. Recent Labs     10/09/20  0342 10/08/20  0950    139   K 4.2 4.6   * 111*   CO2 23 23   BUN 17 22*   CREA 1.22 1.43*   GLU 78 97   CA 8.3* 8.4*     Recent Labs     10/09/20  0342 10/08/20  0950   ALT 17 18   * 134*   TBILI 0.7 0.3   TP 7.5 8.1   ALB 2.9* 3.2*   GLOB 4.6* 4.9*     No results for input(s): INR, PTP, APTT, INREXT in the last 72 hours. Recent Labs     10/08/20  1519   TIBC 415   PSAT 9*   FERR 15*      Lab Results   Component Value Date/Time    Folate 37.8 (H) 10/08/2020 03:19 PM      No results for input(s): PH, PCO2, PO2 in the last 72 hours. No results for input(s): CPK, CKNDX, TROIQ in the last 72 hours.     No lab exists for component: CPKMB  No results found for: CHOL, CHOLX, CHLST, CHOLV, HDL, HDLP, LDL, LDLC, DLDLP, TGLX, TRIGL, TRIGP, CHHD, CHHDX  Lab Results   Component Value Date/Time    Glucose (POC) 93 10/09/2020 06:02 AM    Glucose (POC) 96 10/08/2020 10:33 PM    Glucose (POC) 92 10/08/2020 05:48 PM    Glucose (POC) 88 10/08/2020 03:58 PM    Glucose (POC) 122 (H) 02/24/2019 09:12 PM     Lab Results   Component Value Date/Time    Color YELLOW/STRAW 10/08/2020 11:56 AM    Appearance CLEAR 10/08/2020 11:56 AM    Specific gravity 1.008 10/08/2020 11:56 AM    pH (UA) 6.0 10/08/2020 11:56 AM    Protein TRACE (A) 10/08/2020 11:56 AM    Glucose Negative 10/08/2020 11:56 AM    Ketone Negative 10/08/2020 11:56 AM    Bilirubin Negative 10/08/2020 11:56 AM    Urobilinogen 0.2 10/08/2020 11:56 AM    Nitrites Negative 10/08/2020 11:56 AM    Leukocyte Esterase Negative 10/08/2020 11:56 AM    Epithelial cells FEW 10/08/2020 11:56 AM    Bacteria Negative 10/08/2020 11:56 AM    WBC 0-4 10/08/2020 11:56 AM    RBC 5-10 10/08/2020 11:56 AM         Medications Reviewed:     Current Facility-Administered Medications   Medication Dose Route Frequency    0.9% sodium chloride infusion 250 mL 250 mL IntraVENous PRN    amiodarone (CORDARONE) tablet 200 mg  200 mg Oral DAILY    atorvastatin (LIPITOR) tablet 20 mg  20 mg Oral DAILY    gemfibroziL (LOPID) tablet 600 mg  600 mg Oral BID    sodium chloride (NS) flush 5-40 mL  5-40 mL IntraVENous Q8H    sodium chloride (NS) flush 5-40 mL  5-40 mL IntraVENous PRN    0.9% sodium chloride infusion  75 mL/hr IntraVENous CONTINUOUS    pantoprazole (PROTONIX) 40 mg in 0.9% sodium chloride 10 mL injection  40 mg IntraVENous BID    glucose chewable tablet 16 g  4 Tab Oral PRN    dextrose (D50W) injection syrg 12.5-25 g  25-50 mL IntraVENous PRN    glucagon (GLUCAGEN) injection 1 mg  1 mg IntraMUSCular PRN    insulin lispro (HUMALOG) injection   SubCUTAneous Q6H    influenza vaccine 2020-21 (6 mos+)(PF) (FLUARIX/FLULAVAL/FLUZONE QUAD) injection 0.5 mL  0.5 mL IntraMUSCular PRIOR TO DISCHARGE    0.9% sodium chloride infusion 250 mL  250 mL IntraVENous PRN     ______________________________________________________________________  EXPECTED LENGTH OF STAY: - - -  ACTUAL LENGTH OF STAY:          Santa Owens MD

## 2020-10-09 NOTE — PROGRESS NOTES
Problem: Falls - Risk of  Goal: *Absence of Falls  Description: Document Nathan Nathan Fall Risk and appropriate interventions in the flowsheet.   Outcome: Progressing Towards Goal  Note: Fall Risk Interventions:            Medication Interventions: Patient to call before getting OOB, Teach patient to arise slowly                   Problem: Patient Education: Go to Patient Education Activity  Goal: Patient/Family Education  Outcome: Progressing Towards Goal

## 2020-10-09 NOTE — PROGRESS NOTES
RATNA Plan:     *Home w/family   *wife to transport at d/c   *OBS letter signed & placed on chart    Patient is discharging home today without any needs or concerns.   Follow-up appointment is on the AVS.      Juan Alfred  [id number]

## 2020-10-09 NOTE — DISCHARGE SUMMARY
Discharge Summary       PATIENT ID: Erin Juares  MRN: [id number]   YOB: 1955    DATE OF ADMISSION: 10/8/2020  9:18 AM    DATE OF DISCHARGE: 10/9/2020   PRIMARY CARE PROVIDER: Melissa Patel MD     ATTENDING PHYSICIAN: Dr Carroll Liu  DISCHARGING PROVIDER: Carroll Liu MD    To contact this individual call [phone] and ask the  to page. If unavailable ask to be transferred the Adult Hospitalist Department. CONSULTATIONS: IP CONSULT TO GASTROENTEROLOGY  IP CONSULT TO HOSPITALIST    PROCEDURES/SURGERIES: * No surgery found *    ADMITTING DIAGNOSES & HOSPITAL COURSE:   Acute on chronic anemia:, iron deficiency  -Unclear etiology with extensive work up last year with EGD/Colonoscopy/Capsule  -FOBT negative  -s/p 2 units PRBC 10/8  -Protonix BID  -Appreciate GI, no plans for repeat procedures in the hospital, outpatient follow up  -Appreciate discussion with Dr Petey Lyn, restart home meds and discharge the patient  -Counseled for oral iron, the patient agrees  -PMD might want to send the patient to Hematologist if that has not been done.     Persistent Atrial fibrillation  -ASA/Eliquis were held, GI fine with restarting   -On Amiodarone     JUDAH on CKD  -On gentle iV fluids, improved  -Will stop fluids     DM type 2  -on SSI     Hypertension  -stable on home meds     NPO  -Will put the patient on Diabetic diet        DISCHARGE DIAGNOSES / PLAN:      1.   Anemia     ADDITIONAL CARE RECOMMENDATIONS:    Follow up with PMD  Follow up with GI  Please expect a blood work  (CBC, BMP) when you see your PMD    PENDING TEST RESULTS:   At the time of discharge the following test results are still pending: none    FOLLOW UP APPOINTMENTS:    Follow-up Information     Follow up With Specialties Details Why Contact Info    Melissa Patel MD Family Medicine In 1 week  [address]  [address] [phone], Ronny Ruth MD Gastroenterology In 1 week  [facility]/ Omkar Quiroz [address] [facility] [address]  [phone]               DIET: Diabetic Diet    ACTIVITY: Activity as tolerated      DISCHARGE MEDICATIONS:  Current Discharge Medication List      CONTINUE these medications which have NOT CHANGED    Details   famotidine (PEPCID) 40 mg tablet Take 40 mg by mouth daily. pantoprazole (PROTONIX) 40 mg tablet Take 40 mg by mouth daily. apixaban (Eliquis) 5 mg tablet Take 5 mg by mouth two (2) times a day. docosahexaenoic acid/epa (FISH OIL PO) Take 1 Tab by mouth daily. aspirin delayed-release 81 mg tablet Take 81 mg by mouth daily. multivitamin (ONE A DAY) tablet Take 1 Tab by mouth daily. amiodarone (CORDARONE) 200 mg tablet Take 1 Tab by mouth daily. Qty: 30 Tab, Refills: 0      senna-docusate (PERICOLACE) 8.6-50 mg per tablet Take 1 Tab by mouth daily. Qty: 30 Tab, Refills: 0      lisinopril-hydroCHLOROthiazide (PRINZIDE, ZESTORETIC) 20-12.5 mg per tablet Take 1 Tab by mouth two (2) times a day. atorvastatin (LIPITOR) 20 mg tablet Take 20 mg by mouth daily. gemfibrozil (LOPID) 600 mg tablet Take 600 mg by mouth two (2) times a day. metFORMIN (GLUCOPHAGE) 1,000 mg tablet Take 1,000 mg by mouth two (2) times a day. NOTIFY YOUR PHYSICIAN FOR ANY OF THE FOLLOWING:   Fever over 101 degrees for 24 hours. Chest pain, shortness of breath, fever, chills, nausea, vomiting, diarrhea, change in mentation, falling, weakness, bleeding. Severe pain or pain not relieved by medications. Or, any other signs or symptoms that you may have questions about.     DISPOSITION:   x Home With:   OT  PT  HH  RN       Long term SNF/Inpatient Rehab    Independent/assisted living    Hospice    Other:       PATIENT CONDITION AT DISCHARGE:     Functional status    Poor     Deconditioned    x Independent      Cognition    x Lucid     Forgetful     Dementia      Catheters/lines (plus indication)    Bey     PICC PEG    x None      Code status   x  Full code     DNR      PHYSICAL EXAMINATION AT DISCHARGE:  Please see progress note      CHRONIC MEDICAL DIAGNOSES:  Problem List as of 10/9/2020 Date Reviewed: 2/27/2019          Codes Class Noted - Resolved    GI bleed ICD-10-CM: K92.2  ICD-9-CM: 578.9  10/8/2020 - Present        Other fracture of right femur, initial encounter for closed fracture ([facility] ICD-10-CM: N15.5J3E  ICD-9-CM: 821.00  2/26/2019 - Present        Atrial fibrillation ([facility] ICD-10-CM: I48.91  ICD-9-CM: 427.31  2/19/2019 - Present        Periprosthetic fracture around internal prosthetic hip joint ICD-10-CM: M97. Ever Ramos, [id number]  ICD-9-CM: 996.44, V43.64  2/19/2019 - Present        A-fib ([facility] ICD-10-CM: I48.91  ICD-9-CM: 427.31  2/19/2019 - Present        History of total right hip replacement ICD-10-CM: E57.436  ICD-9-CM: V43.64  2/11/2019 - Present              Greater than 35 minutes were spent with the patient on counseling and coordination of care    Signed:   Jodi Frazier MD  10/9/2020  8:40 AM   .

## 2020-10-09 NOTE — PROGRESS NOTES
Pharmacy Monitoring for Apixaban    Pharmacy review for this 60 y.o. male ordered Apixaban for a fib  Major Interacting Medications: none  Platelet Inhibitors: asa 81 mg daily    Recent Labs     10/09/20  0651 10/09/20  0342 10/08/20  2319 10/08/20  1519 10/08/20  0950   HGB 7.7*  --  7.8* 6.9* 6.6*   PLT  --   --   --   --  184   ALB  --  2.9*  --   --  3.2*   TBILI  --  0.7  --   --  0.3   CREA  --  1.22  --   --  1.43*   BUN  --  17  --   --  22*       Child Quinteros Score, if applicable (Avoid if level C): n/a      Impression/Plan: 61 yo, 86.2 kg, Scr 1.22 - dose appropriate       Thanks    Alexus Ronquillo [facility]

## 2020-10-09 NOTE — PROGRESS NOTES
GI PROGRESS NOTE  Janay Manning, JAZZ  [phone] NP in-hospital cell phone M-F until 4:30  After 5pm or on weekends, please call  for physician on call    NAME:Roney Rachel :1955 XHN:[id number]   ATTG: Dr. Ara Salazar  PCP: Heather Villafana MD  Date/Time:  10/9/2020 9:24 AM     Primary GI: Dr. Yudith Rodriguez, Dr. Yogesh Austin following during admission     Reason for following: Anemia     Assessment:     Acute anemia  History of iron deficiency anemia   · Patient presented to ED after having routine blood work done by PCP. Patient was told that his blood levels were low and that he should go to ED. Patient denies hematochezia/melena and hematemesis. He has no complaints at this time. · Patient had EGD and colonsocopy in 2019 with Dr. Alexandro Thomas for MANUEL. He is not taking an iron supplement. · Hgb 7.7; s/p 2 units PRBC  · FOBT negative      Atrial fibrillation   · On Eliquis- held during admission      Plan:     · Serial H&H; goal for hgb >7.0  · Monitor for s/s of bleeding  · Conservative management at this time. No indication for EGD/colonoscopy now. Patient had negative colonoscopy, EGD and capsule study in the last year. No obvious source of bleeding  · Will need OP follow-up with Dr. Yudith Rodriguez  · Nothing further to add from a GI standpoint. GI signing-off. Please call with any questions. Thank you for this consult. *Plan of care discussed with Dr. Yogesh Austin      Subjective:   Discussed with RN events overnight. Patient denies any new complaints. No episodes of bleeding. Review of Systems:  Symptom Y/N Comments  Symptom Y/N Comments   Fever/Chills n   Chest Pain n    Cough n   Headaches n    Sputum n   Joint Pain n    SOB/HERNANDEZ n   Pruritis/Rash n    Tolerating Diet y   Other       Could NOT obtain due to:      Objective:   VITALS:   Last 24hrs VS reviewed since prior progress note.  Most recent are:  Visit Vitals  BP (!) 147/60   Pulse 67   Temp 98.6 °F (37 °C)   Resp 16   Ht 5' 6\" (1.676 m)   Wt 86.2 kg (190 lb 0.6 oz)   SpO2 98%   BMI 30.67 kg/m²       Intake/Output Summary (Last 24 hours) at 10/9/2020 0924  Last data filed at 10/9/2020 0553  Gross per 24 hour   Intake 419.55 ml   Output 1450 ml   Net -1030.45 ml     PHYSICAL EXAM:  General: Pleasant  male. NAD   HEENT: NC, Atraumatic. Anicteric sclerae. Lungs:  CTA Bilaterally. No Wheezing/Rhonchi/Rales. Heart:  Regular  rhythm,  No murmur, No Rubs, No Gallops  Abdomen: Soft, Non distended, Non tender. +Bowel sounds, no HSM  Extremities: No c/c/e  Neurologic:  Alert and oriented X 3. No acute neurological distress   Psych:   Good insight. Not anxious nor agitated. Lab and Radiology Data Reviewed: (see below)    Medications Reviewed: (see below)  PMH/SH reviewed - no change compared to H&P  ________________________________________________________________________  Total time spent with patient: 25 minutes ________________________________________________________________________  Care Plan discussed with:  Patient x   Family  x   RN               Consultant:  Hospitalist      French Mello NP     Procedures: see electronic medical records for all procedures/Xrays and details which were not copied into this note but were reviewed prior to creation of Plan. LABS:  Recent Labs     10/09/20  0651 10/08/20  2319  10/08/20  0950   WBC  --   --   --  6.9   HGB 7.7* 7.8*   < > 6.6*   HCT  --   --   --  21.9*   PLT  --   --   --  184    < > = values in this interval not displayed. Recent Labs     10/09/20  0342 10/08/20  0950    139   K 4.2 4.6   * 111*   CO2 23 23   BUN 17 22*   CREA 1.22 1.43*   GLU 78 97   CA 8.3* 8.4*     Recent Labs     10/09/20  0342 10/08/20  0950   * 134*   TP 7.5 8.1   ALB 2.9* 3.2*   GLOB 4.6* 4.9*     No results for input(s): INR, PTP, APTT, INREXT in the last 72 hours.    Recent Labs     10/08/20  1519   TIBC 415   PSAT 9*   FERR 15*      Lab Results   Component Value Date/Time    Folate 37.8 (H) 10/08/2020 03:19 PM     No results for input(s): PH, PCO2, PO2 in the last 72 hours. No results for input(s): CPK, CKMB in the last 72 hours.     No lab exists for component: TROPONINI  Lab Results   Component Value Date/Time    Color YELLOW/STRAW 10/08/2020 11:56 AM    Appearance CLEAR 10/08/2020 11:56 AM    Specific gravity 1.008 10/08/2020 11:56 AM    pH (UA) 6.0 10/08/2020 11:56 AM    Protein TRACE (A) 10/08/2020 11:56 AM    Glucose Negative 10/08/2020 11:56 AM    Ketone Negative 10/08/2020 11:56 AM    Bilirubin Negative 10/08/2020 11:56 AM    Urobilinogen 0.2 10/08/2020 11:56 AM    Nitrites Negative 10/08/2020 11:56 AM    Leukocyte Esterase Negative 10/08/2020 11:56 AM    Epithelial cells FEW 10/08/2020 11:56 AM    Bacteria Negative 10/08/2020 11:56 AM    WBC 0-4 10/08/2020 11:56 AM    RBC 5-10 10/08/2020 11:56 AM       MEDICATIONS:  Current Facility-Administered Medications   Medication Dose Route Frequency    apixaban (ELIQUIS) tablet 5 mg  5 mg Oral BID    aspirin delayed-release tablet 81 mg  81 mg Oral DAILY    pantoprazole (PROTONIX) tablet 40 mg  40 mg Oral DAILY    0.9% sodium chloride infusion 250 mL  250 mL IntraVENous PRN    amiodarone (CORDARONE) tablet 200 mg  200 mg Oral DAILY    atorvastatin (LIPITOR) tablet 20 mg  20 mg Oral DAILY    gemfibroziL (LOPID) tablet 600 mg  600 mg Oral BID    sodium chloride (NS) flush 5-40 mL  5-40 mL IntraVENous Q8H    sodium chloride (NS) flush 5-40 mL  5-40 mL IntraVENous PRN    0.9% sodium chloride infusion  75 mL/hr IntraVENous CONTINUOUS    glucose chewable tablet 16 g  4 Tab Oral PRN    dextrose (D50W) injection syrg 12.5-25 g  25-50 mL IntraVENous PRN    glucagon (GLUCAGEN) injection 1 mg  1 mg IntraMUSCular PRN    insulin lispro (HUMALOG) injection   SubCUTAneous Q6H    influenza vaccine 2020-21 (6 mos+)(PF) (FLUARIX/FLULAVAL/FLUZONE QUAD) injection 0.5 mL  0.5 mL IntraMUSCular PRIOR TO DISCHARGE    0.9% sodium chloride infusion 250 mL  250 mL IntraVENous PRN

## 2020-10-26 ENCOUNTER — OFFICE VISIT (OUTPATIENT)
Dept: CARDIOLOGY CLINIC | Age: 65
End: 2020-10-26
Payer: COMMERCIAL

## 2020-10-26 VITALS
RESPIRATION RATE: 18 BRPM | BODY MASS INDEX: 30.82 KG/M2 | WEIGHT: 191.8 LBS | HEIGHT: 66 IN | OXYGEN SATURATION: 96 % | DIASTOLIC BLOOD PRESSURE: 78 MMHG | SYSTOLIC BLOOD PRESSURE: 120 MMHG | HEART RATE: 74 BPM

## 2020-10-26 DIAGNOSIS — F17.200 CURRENT SMOKER: ICD-10-CM

## 2020-10-26 DIAGNOSIS — D64.9 ANEMIA, UNSPECIFIED TYPE: ICD-10-CM

## 2020-10-26 DIAGNOSIS — E78.2 MIXED HYPERLIPIDEMIA: ICD-10-CM

## 2020-10-26 DIAGNOSIS — I48.0 PAROXYSMAL ATRIAL FIBRILLATION (HCC): Primary | ICD-10-CM

## 2020-10-26 DIAGNOSIS — I10 ESSENTIAL HYPERTENSION: ICD-10-CM

## 2020-10-26 PROCEDURE — 93000 ELECTROCARDIOGRAM COMPLETE: CPT | Performed by: INTERNAL MEDICINE

## 2020-10-26 PROCEDURE — 99214 OFFICE O/P EST MOD 30 MIN: CPT | Performed by: INTERNAL MEDICINE

## 2020-10-26 PROCEDURE — 1111F DSCHRG MED/CURRENT MED MERGE: CPT | Performed by: INTERNAL MEDICINE

## 2020-10-26 RX ORDER — LANOLIN ALCOHOL/MO/W.PET/CERES
45 CREAM (GRAM) TOPICAL
COMMUNITY

## 2020-10-26 RX ORDER — DILTIAZEM HYDROCHLORIDE 180 MG/1
120 CAPSULE, COATED, EXTENDED RELEASE ORAL DAILY
Qty: 90 CAP | Refills: 1 | Status: SHIPPED | OUTPATIENT
Start: 2020-10-26 | End: 2021-04-25

## 2020-10-26 NOTE — LETTER
10/26/20 Patient: Janet Paz YOB: 1955 Date of Visit: 10/26/2020 Amisha Ford MD 
[address] 
[address] [address] VIA Facsimile: [phone] Dear Amisha Ford MD, Thank you for referring Mr. Mirela Allison to [address] [place] [address] for evaluation. My notes for this consultation are attached. If you have questions, please do not hesitate to call me. I look forward to following your patient along with you.  
 
 
Sincerely, 
 
Pedro Pablo Rincon MD

## 2020-10-26 NOTE — PROGRESS NOTES
1. Have you been to the ER, urgent care clinic since your last visit? Hospitalized since your last visit? Seen on 10/8/20    2. Have you seen or consulted any other health care providers outside of the [address] [place] [address] since your last visit? Include any pap smears or colon screening.    No.      Chief Complaint   Patient presents with    Annual Exam     pt denies any cardiac symptoms   [facility] Follow Up     for abnormal blood count

## 2020-10-26 NOTE — PROGRESS NOTES
[address] [place] [address]  [phone]     Subjective:      Darvin Vasquez is a 60 y.o. male is here for routine f/u. He has pmxh PAF HTN HLD DM and anemia. Admitted 10/8  10/9/2020 for acute on chronic anemia s/p 2 units PRBC. Had thorough GI work up last year,  Recommended to f/u with them OP. Last seen by us in 2/19. The patient denies chest pain/ shortness of breath, orthopnea, PND, LE edema, palpitations, syncope, or presyncope.        Patient Active Problem List    Diagnosis Date Noted    Anemia 10/09/2020    GI bleed 10/08/2020    Other fracture of right femur, initial encounter for closed fracture ([facility] [address] 02/26/2019    Atrial fibrillation ([facility] [address] 02/19/2019    Periprosthetic fracture around internal prosthetic hip joint 02/19/2019    A-fib ([facility] [address] 02/19/2019    History of total right hip replacement 02/11/2019      Helena Brunner MD  Past Medical History:   Diagnosis Date    Arthritis     Atrial fibrillation [facility])     CAD (coronary artery disease)     lipids    Chronic obstructive pulmonary disease (HCC)     Chronic pain     Diabetes ([facility] [address]     Hypertension     Infectious disease       Past Surgical History:   Procedure Laterality Date    HX HIP REPLACEMENT Right 02/11/2019    Acetabular bone graft    HX TONSILLECTOMY       No Known Allergies   Family History   Problem Relation Age of Onset    Heart Disease Mother     Heart Disease Father     Alzheimer Father     Heart Disease Brother       Social History     Socioeconomic History    Marital status: SINGLE     Spouse name: Not on file    Number of children: Not on file    Years of education: Not on file    Highest education level: Not on file   Occupational History    Not on file   Social Needs    Financial resource strain: Not on file    Food insecurity     Worry: Not on file     Inability: Not on file    Transportation needs     Medical: Not on file     Non-medical: Not on file   Tobacco Use    Smoking status: Current Every Day Smoker     Packs/day: 1.50     Years: 30.00     Pack years: 45.00     Types: Cigarettes    Smokeless tobacco: Never Used    Tobacco comment: 2 ppd for years/cutting back   Substance and Sexual Activity    Alcohol use: Yes     Alcohol/week: 4.0 standard drinks     Types: 4 Cans of beer per week     Frequency: 4 or more times a week     Comment: 1 drink daily    Drug use: No    Sexual activity: Not on file   Lifestyle    Physical activity     Days per week: Not on file     Minutes per session: Not on file    Stress: Not on file   Relationships    Social connections     Talks on phone: Not on file     Gets together: Not on file     Attends Mandaeism service: Not on file     Active member of club or organization: Not on file     Attends meetings of clubs or organizations: Not on file     Relationship status: Not on file    Intimate partner violence     Fear of current or ex partner: Not on file     Emotionally abused: Not on file     Physically abused: Not on file     Forced sexual activity: Not on file   Other Topics Concern    Not on file   Social History Narrative    Not on file      Current Outpatient Medications   Medication Sig    ferrous sulfate (Iron) 325 mg (65 mg iron) tablet Take 45 mg by mouth Daily (before breakfast).  apixaban (Eliquis) 5 mg tablet Take 1 Tab by mouth two (2) times a day.  dilTIAZem ER (CARDIZEM CD) 180 mg capsule Take 1 Cap by mouth daily.  famotidine (PEPCID) 40 mg tablet Take 20 mg by mouth daily.  pantoprazole (PROTONIX) 40 mg tablet Take 40 mg by mouth daily.  docosahexaenoic acid/epa (FISH OIL PO) Take 1 Tab by mouth daily.  aspirin delayed-release 81 mg tablet Take 81 mg by mouth daily.  multivitamin (ONE A DAY) tablet Take 1 Tab by mouth daily.  amiodarone (CORDARONE) 200 mg tablet Take 1 Tab by mouth daily.  senna-docusate (PERICOLACE) 8.6-50 mg per tablet Take 1 Tab by mouth daily.     lisinopril-hydroCHLOROthiazide (PRINZIDE, ZESTORETIC) 20-12.5 mg per tablet Take 1 Tab by mouth two (2) times a day.  atorvastatin (LIPITOR) 20 mg tablet Take 20 mg by mouth daily.  gemfibrozil (LOPID) 600 mg tablet Take 600 mg by mouth two (2) times a day.  metFORMIN (GLUCOPHAGE) 1,000 mg tablet Take 1,000 mg by mouth two (2) times a day. No current facility-administered medications for this visit. Review of Symptoms:  11 systems reviewed, negative other than as stated in the HPI    Physical ExamPhysical Exam:    Vitals:    10/26/20 1517   BP: 120/78   Pulse: 74   Resp: 18   SpO2: 96%   Weight: 191 lb 12.8 oz (87 kg)   Height: 5' 6\" (1.676 m)     Body mass index is 30.96 kg/m². General PE  Gen:  NAD  Mental Status - Alert. General Appearance - Not in acute distress. HEENT:  PERRL, no carotid bruits or JVD  Chest and Lung Exam   Inspection: Accessory muscles - No use of accessory muscles in breathing. Auscultation:   Breath sounds: - Normal.   Cardiovascular   Inspection: Jugular vein - Bilateral - Inspection Normal.   Palpation/Percussion:   Apical Impulse: - Normal.   Auscultation: Rhythm - Regular. Heart Sounds - S1 WNL and S2 WNL. No S3 or S4. Murmurs & Other Heart Sounds: Auscultation of the heart reveals - No Murmurs. Peripheral Vascular   Upper Extremity: Inspection - Bilateral - No Cyanotic nailbeds or Digital clubbing. Lower Extremity:   Palpation: Edema - Bilateral - No edema. Abdomen:   Soft, non-tender, bowel sounds are active.   Neuro: A&O times 3, CN and motor grossly WNL    Labs:   No results found for: CHOL, CHOLX, CHLST, CHOLV, 673814, HDL, HDLP, LDL, LDLC, DLDLP, TGLX, TRIGL, TRIGP, CHHD, CHHDX  No results found for: CPK, CPKX, CPX  Lab Results   Component Value Date/Time    Sodium 138 10/09/2020 03:42 AM    Potassium 4.2 10/09/2020 03:42 AM    Chloride 110 (H) 10/09/2020 03:42 AM    CO2 23 10/09/2020 03:42 AM    Anion gap 5 10/09/2020 03:42 AM    Glucose 78 10/09/2020 03:42 AM    BUN 17 10/09/2020 03:42 AM    Creatinine 1.22 10/09/2020 03:42 AM    BUN/Creatinine ratio 14 10/09/2020 03:42 AM    GFR est AA >60 10/09/2020 03:42 AM    GFR est non-AA 60 (L) 10/09/2020 03:42 AM    Calcium 8.3 (L) 10/09/2020 03:42 AM    Bilirubin, total 0.7 10/09/2020 03:42 AM    Alk. phosphatase 123 (H) 10/09/2020 03:42 AM    Protein, total 7.5 10/09/2020 03:42 AM    Albumin 2.9 (L) 10/09/2020 03:42 AM    Globulin 4.6 (H) 10/09/2020 03:42 AM    A-G Ratio 0.6 (L) 10/09/2020 03:42 AM    ALT (SGPT) 17 10/09/2020 03:42 AM       EKG:  SR     Assessment:     Assessment:      1. Paroxysmal atrial fibrillation (HCC)    2. Essential hypertension    3. Mixed hyperlipidemia    4. Anemia, unspecified type    5. Current smoker        Orders Placed This Encounter    IA DISCHARGE MEDS RECONCILED W/ CURRENT OUTPATIENT MED LIST    AMB POC EKG ROUTINE W/ 12 LEADS, INTER & REP     Order Specific Question:   Reason for Exam:     Answer:   routine    ferrous sulfate (Iron) 325 mg (65 mg iron) tablet     Sig: Take 45 mg by mouth Daily (before breakfast).  apixaban (Eliquis) 5 mg tablet     Sig: Take 1 Tab by mouth two (2) times a day. Dispense:  180 Tab     Refill:  3    dilTIAZem ER (CARDIZEM CD) 180 mg capsule     Sig: Take 1 Cap by mouth daily. Dispense:  90 Cap     Refill:  1        Plan:     PAF  Normal EF Mild TR per echo in 2/19  Continue Eliquis 5 mg BID  Continue Amiodarone 200 mg daily. In 3 months, onsider discontinuation of amiodarone, followed by event monitor a couple weeks later to verify no recurrent atrial fibrillation.     Previously on Diltiazem 120 mg daily      HTN  Controlled with current therapy    HLD  On statin Labs and lipids per PCP    DM  On oral agents    Current 0.5 PPD smoker: No intentions on quitting at this time    S/p inpatient admission for anemia  10/8  10/9/2020 s/p 2 units PRBC 10/8  Unclear etiology with extensive work up last year with EGD/Colonoscopy/Capsule  Protonix BID however he was dc on famotidine, oral iron supplement  Appreciate GI, no plans for repeat procedures in the hospital, outpatient follow up with Dr Zohreh Balderrama on Thursday  Hgb 7.7 in 10/9/2020  Could consider watchman device in the future if recurrent anemia that is attributed to blood loss. F/u in 3 months. Consider discontinuation of amiodarone at that time, followed by event monitor a couple weeks later to verify no recurrent atrial fibrillation.         Nayely Varela MD

## 2020-12-11 ENCOUNTER — TRANSCRIBE ORDER (OUTPATIENT)
Dept: SCHEDULING | Age: 65
End: 2020-12-11

## 2020-12-11 DIAGNOSIS — D50.9 IRON DEFICIENCY ANEMIA, UNSPECIFIED: ICD-10-CM

## 2020-12-11 DIAGNOSIS — D47.2 MONOCLONAL GAMMOPATHY: Primary | ICD-10-CM

## 2020-12-14 ENCOUNTER — TELEPHONE (OUTPATIENT)
Dept: CARDIOLOGY CLINIC | Age: 65
End: 2020-12-14

## 2020-12-14 NOTE — TELEPHONE ENCOUNTER
[facility] requesting surgical clearance 1/13/21 for left cystoscopy retrograde ureteroscopy with laser and stent placement under general anesthesia . LV 10/26/20 NV 2/5/21. Taking ASA & Eliquis . Please advise thanks.

## 2020-12-22 ENCOUNTER — TELEPHONE (OUTPATIENT)
Dept: CARDIOLOGY CLINIC | Age: 65
End: 2020-12-22

## 2021-02-05 ENCOUNTER — OFFICE VISIT (OUTPATIENT)
Dept: CARDIOLOGY CLINIC | Age: 66
End: 2021-02-05
Payer: COMMERCIAL

## 2021-02-05 VITALS
SYSTOLIC BLOOD PRESSURE: 120 MMHG | HEIGHT: 66 IN | DIASTOLIC BLOOD PRESSURE: 60 MMHG | WEIGHT: 193.6 LBS | OXYGEN SATURATION: 97 % | BODY MASS INDEX: 31.12 KG/M2 | RESPIRATION RATE: 16 BRPM | HEART RATE: 80 BPM

## 2021-02-05 DIAGNOSIS — I48.0 PAROXYSMAL ATRIAL FIBRILLATION (HCC): Primary | ICD-10-CM

## 2021-02-05 DIAGNOSIS — E78.2 MIXED HYPERLIPIDEMIA: ICD-10-CM

## 2021-02-05 DIAGNOSIS — F17.200 CURRENT SMOKER: ICD-10-CM

## 2021-02-05 DIAGNOSIS — I10 ESSENTIAL HYPERTENSION: ICD-10-CM

## 2021-02-05 PROCEDURE — 99214 OFFICE O/P EST MOD 30 MIN: CPT | Performed by: INTERNAL MEDICINE

## 2021-02-05 PROCEDURE — 93000 ELECTROCARDIOGRAM COMPLETE: CPT | Performed by: INTERNAL MEDICINE

## 2021-02-05 RX ORDER — TAMSULOSIN HYDROCHLORIDE 0.4 MG/1
CAPSULE ORAL
COMMUNITY
Start: 2021-01-13

## 2021-02-05 RX ORDER — TRAMADOL HYDROCHLORIDE 50 MG/1
TABLET ORAL
COMMUNITY
Start: 2021-01-13 | End: 2021-02-05 | Stop reason: ALTCHOICE

## 2021-02-05 RX ORDER — HYDROCODONE BITARTRATE AND ACETAMINOPHEN 5; 325 MG/1; MG/1
TABLET ORAL
COMMUNITY
Start: 2021-02-02 | End: 2021-02-05 | Stop reason: ALTCHOICE

## 2021-02-05 NOTE — PROGRESS NOTES
[address]  [phone]     Subjective:      Telma Ibanez is a 73 y.o. male is here for routine f/u. He has pmxh PAF HTN HLD DM and anemia. Last seen by us in 10/2020. He presents in NSR. He continues to smoke, trying to quit. Reports s/p ureteral stent placed about 3 wks ago. The patient denies chest pain/ shortness of breath, orthopnea, PND, LE edema, palpitations, syncope, or presyncope.        Patient Active Problem List    Diagnosis Date Noted    Anemia 10/09/2020    GI bleed 10/08/2020    Other fracture of right femur, initial encounter for closed fracture ([facility] [address] 02/26/2019    Atrial fibrillation ([facility] [address] 02/19/2019    Periprosthetic fracture around internal prosthetic hip joint 02/19/2019    A-fib ([facility] [address] 02/19/2019    History of total right hip replacement 02/11/2019      Unruly Mayfield MD  Past Medical History:   Diagnosis Date    Arthritis     Atrial fibrillation [facility])     CAD (coronary artery disease)     lipids    Chronic obstructive pulmonary disease (HCC)     Chronic pain     Diabetes ([facility] [address]     Hypertension     Infectious disease       Past Surgical History:   Procedure Laterality Date    HX HIP REPLACEMENT Right 02/11/2019    Acetabular bone graft    HX TONSILLECTOMY       No Known Allergies   Family History   Problem Relation Age of Onset    Heart Disease Mother     Heart Disease Father     Alzheimer Father     Heart Disease Brother       Social History     Socioeconomic History    Marital status: SINGLE     Spouse name: Not on file    Number of children: Not on file    Years of education: Not on file    Highest education level: Not on file   Occupational History    Not on file   Social Needs    Financial resource strain: Not on file    Food insecurity     Worry: Not on file     Inability: Not on file    Transportation needs     Medical: Not on file     Non-medical: Not on file   Tobacco Use    Smoking status: Current Every Day Smoker     Packs/day: 1.50     Years: 30.00     Pack years: 45.00     Types: Cigarettes    Smokeless tobacco: Never Used    Tobacco comment: 2 ppd for years/cutting back   Substance and Sexual Activity    Alcohol use: Yes     Alcohol/week: 4.0 standard drinks     Types: 4 Cans of beer per week     Frequency: 4 or more times a week     Comment: 1 drink daily    Drug use: No    Sexual activity: Not on file   Lifestyle    Physical activity     Days per week: Not on file     Minutes per session: Not on file    Stress: Not on file   Relationships    Social connections     Talks on phone: Not on file     Gets together: Not on file     Attends Zoroastrianism service: Not on file     Active member of club or organization: Not on file     Attends meetings of clubs or organizations: Not on file     Relationship status: Not on file    Intimate partner violence     Fear of current or ex partner: Not on file     Emotionally abused: Not on file     Physically abused: Not on file     Forced sexual activity: Not on file   Other Topics Concern    Not on file   Social History Narrative    Not on file      Current Outpatient Medications   Medication Sig    tamsulosin (FLOMAX) 0.4 mg capsule TAKE 1 CAPSULE EVERY DAY 30 MINUTES AFTER THE SAME MEAL EACH DAY    ferrous sulfate (Iron) 325 mg (65 mg iron) tablet Take 45 mg by mouth Daily (before breakfast).  apixaban (Eliquis) 5 mg tablet Take 1 Tab by mouth two (2) times a day.  dilTIAZem ER (CARDIZEM CD) 180 mg capsule Take 1 Cap by mouth daily.  famotidine (PEPCID) 40 mg tablet Take 40 mg by mouth daily.  pantoprazole (PROTONIX) 40 mg tablet Take 40 mg by mouth daily.  docosahexaenoic acid/epa (FISH OIL PO) Take 1 Tab by mouth daily.  aspirin delayed-release 81 mg tablet Take 81 mg by mouth daily.  multivitamin (ONE A DAY) tablet Take 1 Tab by mouth daily.  amiodarone (CORDARONE) 200 mg tablet Take 1 Tab by mouth daily.     senna-docusate (PERICOLACE) 8.6-50 mg per tablet Take 1 Tab by mouth daily.  lisinopril-hydroCHLOROthiazide (PRINZIDE, ZESTORETIC) 20-12.5 mg per tablet Take 1 Tab by mouth two (2) times a day.  atorvastatin (LIPITOR) 20 mg tablet Take 20 mg by mouth daily.  gemfibrozil (LOPID) 600 mg tablet Take 600 mg by mouth two (2) times a day.  metFORMIN (GLUCOPHAGE) 1,000 mg tablet Take 1,000 mg by mouth two (2) times a day. No current facility-administered medications for this visit. Review of Symptoms:  11 systems reviewed, negative other than as stated in the HPI    Physical ExamPhysical Exam:    Vitals:    02/05/21 1302   BP: 120/60   Pulse: 80   Resp: 16   SpO2: 97%   Weight: 193 lb 9.6 oz (87.8 kg)   Height: 5' 6\" (1.676 m)     Body mass index is 31.25 kg/m². General PE  Gen:  NAD  Mental Status - Alert. General Appearance - Not in acute distress. HEENT:  PERRL, no carotid bruits or JVD  Chest and Lung Exam   Inspection: Accessory muscles - No use of accessory muscles in breathing. Auscultation:   Breath sounds: - Normal.   Cardiovascular   Inspection: Jugular vein - Bilateral - Inspection Normal.   Palpation/Percussion:   Apical Impulse: - Normal.   Auscultation: Rhythm - Regular. Heart Sounds - S1 WNL and S2 WNL. No S3 or S4. Murmurs & Other Heart Sounds: Auscultation of the heart reveals - No Murmurs. Peripheral Vascular   Upper Extremity: Inspection - Bilateral - No Cyanotic nailbeds or Digital clubbing. Lower Extremity:   Palpation: Edema - Bilateral - No edema. Abdomen:   Soft, non-tender, bowel sounds are active.   Neuro: A&O times 3, CN and motor grossly WNL    Labs:   No results found for: CHOL, CHOLX, CHLST, CHOLV, [id number], HDL, HDLP, LDL, LDLC, DLDLP, TGLX, TRIGL, TRIGP, CHHD, CHHDX  No results found for: CPK, CPKX, CPX  Lab Results   Component Value Date/Time    Sodium 138 10/09/2020 03:42 AM    Potassium 4.2 10/09/2020 03:42 AM    Chloride 110 (H) 10/09/2020 03:42 AM    CO2 23 10/09/2020 03:42 AM    Anion gap 5 10/09/2020 03:42 AM    Glucose 78 10/09/2020 03:42 AM    BUN 17 10/09/2020 03:42 AM    Creatinine 1.22 10/09/2020 03:42 AM    BUN/Creatinine ratio 14 10/09/2020 03:42 AM    GFR est AA >60 10/09/2020 03:42 AM    GFR est non-AA 60 (L) 10/09/2020 03:42 AM    Calcium 8.3 (L) 10/09/2020 03:42 AM    Bilirubin, total 0.7 10/09/2020 03:42 AM    Alk. phosphatase 123 (H) 10/09/2020 03:42 AM    Protein, total 7.5 10/09/2020 03:42 AM    Albumin 2.9 (L) 10/09/2020 03:42 AM    Globulin 4.6 (H) 10/09/2020 03:42 AM    A-G Ratio 0.6 (L) 10/09/2020 03:42 AM    ALT (SGPT) 17 10/09/2020 03:42 AM       EKG:  SR     Assessment:        1. Paroxysmal atrial fibrillation (HCC)    2. Essential hypertension    3. Mixed hyperlipidemia    4. Current smoker        Orders Placed This Encounter    AMB POC EKG ROUTINE W/ 12 LEADS, INTER & REP     Order Specific Question:   Reason for Exam:     Answer:   routine    DISCONTD: HYDROcodone-acetaminophen (NORCO) 5-325 mg per tablet    tamsulosin (FLOMAX) 0.4 mg capsule     Sig: TAKE 1 CAPSULE EVERY DAY 30 MINUTES AFTER THE SAME MEAL EACH DAY    DISCONTD: traMADoL (ULTRAM) 50 mg tablet     Sig: TAKE 1 TABLET EVERY SIX HOURS AS NEEDED FOR BREAKTHROUGH PAIN        Plan:     PAF  Normal EF Mild TR per echo in 2/19  Continue Eliquis 5 mg BID  Discussed risks and benefits of anticoagulation, signs and symptoms of bleeding, and to call if any concerns. Continue Diltiazem 120 mg daily   He remains in NSR,  Will dc amiodarone now, repeat 1 mos event monitor 2/22/2021  To r/o  recurrent atrial fibrillation.   Recall: 10/2020 admission for acute blood loss anemia, Could consider watchman device in the future if recurrent     HTN  Controlled with current therapy    HLD  On statin Labs and lipids per PCP    DM  On oral agents    Current 0.5 PPD smoker; trying to quit    S/p ureteral stent 1/2021    Recall: S/p inpatient admission for anemia  10/8  10/9/2020 s/p 2 units PRBC 10/8  Unclear etiology with extensive work up last year with EGD/Colonoscopy/Capsule  Protonix BID however he was dc on famotidine, oral iron supplement  Appreciate GI, no plans for repeat procedures in the hospital, outpatient follow up with Dr Aj Mcginnis on Thursday  Hgb 7.7 in 10/9/2020  Attempted to get CBC that was done prior to urologic procedure, but never received the results so will recheck CBC now. Could consider watchman device in the future if recurrent anemia that is attributed to blood loss. Continue current care and f/u in 6 months, sooner PRN.         Jon Watson MD

## 2021-02-05 NOTE — PROGRESS NOTES
Chief Complaint   Patient presents with    Follow-up    Irregular Heart Beat    Hypertension    Cholesterol Problem     1. Have you been to the ER, urgent care clinic since your last visit? No Hospitalized since your last visit? No    2. Have you seen or consulted any other health care providers outside of the [address] [place] [address] since your last visit? Yes urology  Include any pap smears or colon screening. No    Patient has no cardiac concerns.

## 2021-02-05 NOTE — LETTER
2/7/2021 Patient: Chance Berg YOB: 1955 Date of Visit: 2/5/2021 Jaleel Rome MD 
[address] [facility] 
[address] [address] [address] Fax: [phone] Dear Jaleel Rome MD, Thank you for referring Mr. Gilmer Magaña to [address] [place] [address] for evaluation. My notes for this consultation are attached. If you have questions, please do not hesitate to call me. I look forward to following your patient along with you.  
 
 
Sincerely, 
 
Tai Medina MD

## 2021-02-10 ENCOUNTER — TRANSCRIBE ORDER (OUTPATIENT)
Dept: SCHEDULING | Age: 66
End: 2021-02-10

## 2021-02-10 DIAGNOSIS — D47.2 GAMMOPATHY, MONOCLONAL: ICD-10-CM

## 2021-02-10 DIAGNOSIS — R93.421 ABNORMAL RADIOLOGIC FINDINGS ON DIAGNOSTIC IMAGING OF RIGHT KIDNEY: ICD-10-CM

## 2021-02-10 DIAGNOSIS — Z87.891 PERSONAL HISTORY OF TOBACCO USE, PRESENTING HAZARDS TO HEALTH: ICD-10-CM

## 2021-02-10 DIAGNOSIS — F17.210 NICOTINE DEPENDENCE, CIGARETTES, UNCOMPLICATED: ICD-10-CM

## 2021-02-10 DIAGNOSIS — N18.30 CHRONIC KIDNEY DISEASE, STAGE III (MODERATE) (HCC): ICD-10-CM

## 2021-02-10 DIAGNOSIS — D50.9 IRON DEFICIENCY ANEMIA, UNSPECIFIED: Primary | ICD-10-CM

## 2021-02-11 LAB
BASOPHILS # BLD AUTO: 0 X10E3/UL (ref 0–0.2)
BASOPHILS NFR BLD AUTO: 1 %
EOSINOPHIL # BLD AUTO: 0.1 X10E3/UL (ref 0–0.4)
EOSINOPHIL NFR BLD AUTO: 1 %
ERYTHROCYTE [DISTWIDTH] IN BLOOD BY AUTOMATED COUNT: 17.8 % (ref 11.6–15.4)
HCT VFR BLD AUTO: 26.6 % (ref 37.5–51)
HGB BLD-MCNC: 8.7 G/DL (ref 13–17.7)
IMM GRANULOCYTES # BLD AUTO: 0 X10E3/UL (ref 0–0.1)
IMM GRANULOCYTES NFR BLD AUTO: 0 %
LYMPHOCYTES # BLD AUTO: 1.4 X10E3/UL (ref 0.7–3.1)
LYMPHOCYTES NFR BLD AUTO: 29 %
MCH RBC QN AUTO: 32.5 PG (ref 26.6–33)
MCHC RBC AUTO-ENTMCNC: 32.7 G/DL (ref 31.5–35.7)
MCV RBC AUTO: 99 FL (ref 79–97)
MONOCYTES # BLD AUTO: 0.5 X10E3/UL (ref 0.1–0.9)
MONOCYTES NFR BLD AUTO: 11 %
NEUTROPHILS # BLD AUTO: 2.8 X10E3/UL (ref 1.4–7)
NEUTROPHILS NFR BLD AUTO: 58 %
PLATELET # BLD AUTO: 173 X10E3/UL (ref 150–450)
RBC # BLD AUTO: 2.68 X10E6/UL (ref 4.14–5.8)
WBC # BLD AUTO: 4.8 X10E3/UL (ref 3.4–10.8)

## 2021-02-12 ENCOUNTER — TELEPHONE (OUTPATIENT)
Dept: CARDIOLOGY CLINIC | Age: 66
End: 2021-02-12

## 2021-02-12 NOTE — TELEPHONE ENCOUNTER
----- Message from Kevan Clark NP sent at 2/12/2021  1:44 PM EST -----  Hgb stable at 8.7  Ok to continue eliquis.   Call if any recurrent bleeding

## 2021-02-15 NOTE — TELEPHONE ENCOUNTER
Naty informed on Hippa Verified patient with two identifiers. Has appt  2/19/21 for monitoring hasnt had any bleeding concerns.

## 2021-02-16 ENCOUNTER — TRANSCRIBE ORDER (OUTPATIENT)
Dept: SCHEDULING | Age: 66
End: 2021-02-16

## 2021-02-16 DIAGNOSIS — R93.421 ABNORMAL FINDING ON DIAGNOSTIC IMAGING OF RIGHT KIDNEY: ICD-10-CM

## 2021-02-16 DIAGNOSIS — D50.9 IRON DEFICIENCY ANEMIA, UNSPECIFIED: Primary | ICD-10-CM

## 2021-02-16 DIAGNOSIS — N18.30 CHRONIC KIDNEY DISEASE, STAGE III (MODERATE) (HCC): ICD-10-CM

## 2021-02-16 DIAGNOSIS — D47.2 GAMMOPATHY, MONOCLONAL: ICD-10-CM

## 2021-02-19 ENCOUNTER — CLINICAL SUPPORT (OUTPATIENT)
Dept: CARDIOLOGY CLINIC | Age: 66
End: 2021-02-19
Payer: COMMERCIAL

## 2021-02-19 DIAGNOSIS — I49.9 IRREGULAR HEART RATE: ICD-10-CM

## 2021-02-19 DIAGNOSIS — I48.0 PAROXYSMAL ATRIAL FIBRILLATION (HCC): Primary | ICD-10-CM

## 2021-02-19 PROCEDURE — 93270 REMOTE 30 DAY ECG REV/REPORT: CPT | Performed by: INTERNAL MEDICINE

## 2021-02-19 PROCEDURE — 93272 ECG/REVIEW INTERPRET ONLY: CPT | Performed by: INTERNAL MEDICINE

## 2021-02-19 NOTE — PROGRESS NOTES
Patient received a 30 day event monitor. Instructions given verbally as well as an instruction sheet. Pt verbalized understanding.     [facility] Event Monitoring

## 2021-02-22 DIAGNOSIS — I48.0 PAROXYSMAL ATRIAL FIBRILLATION (HCC): ICD-10-CM

## 2021-02-22 DIAGNOSIS — E78.2 MIXED HYPERLIPIDEMIA: ICD-10-CM

## 2021-02-22 DIAGNOSIS — F17.200 CURRENT SMOKER: ICD-10-CM

## 2021-02-22 DIAGNOSIS — I10 ESSENTIAL HYPERTENSION: ICD-10-CM

## 2021-02-24 ENCOUNTER — HOSPITAL ENCOUNTER (OUTPATIENT)
Dept: PREADMISSION TESTING | Age: 66
Discharge: HOME | End: 2021-02-24
Payer: COMMERCIAL

## 2021-02-24 VITALS
OXYGEN SATURATION: 95 % | DIASTOLIC BLOOD PRESSURE: 45 MMHG | SYSTOLIC BLOOD PRESSURE: 138 MMHG | HEART RATE: 79 BPM | HEIGHT: 64 IN | TEMPERATURE: 98.5 F | WEIGHT: 193.12 LBS | BODY MASS INDEX: 32.97 KG/M2 | RESPIRATION RATE: 16 BRPM

## 2021-02-24 LAB
ABO + RH BLD: NORMAL
ALBUMIN SERPL-MCNC: 3.4 G/DL (ref 3.5–5)
ALBUMIN/GLOB SERPL: 0.9 {RATIO} (ref 1.1–2.2)
ALP SERPL-CCNC: 109 U/L (ref 45–117)
ALT SERPL-CCNC: 15 U/L (ref 12–78)
ANION GAP SERPL CALC-SCNC: 7 MMOL/L (ref 5–15)
APPEARANCE UR: CLEAR
APTT PPP: 27.3 SEC (ref 22.1–31)
AST SERPL-CCNC: 8 U/L (ref 15–37)
BACTERIA URNS QL MICRO: NEGATIVE /HPF
BILIRUB SERPL-MCNC: 0.2 MG/DL (ref 0.2–1)
BILIRUB UR QL: NEGATIVE
BLOOD GROUP ANTIBODIES SERPL: NORMAL
BUN SERPL-MCNC: 52 MG/DL (ref 6–20)
BUN/CREAT SERPL: 22 (ref 12–20)
CALCIUM SERPL-MCNC: 8.1 MG/DL (ref 8.5–10.1)
CHLORIDE SERPL-SCNC: 114 MMOL/L (ref 97–108)
CO2 SERPL-SCNC: 21 MMOL/L (ref 21–32)
COLOR UR: ABNORMAL
CREAT SERPL-MCNC: 2.33 MG/DL (ref 0.7–1.3)
EPITH CASTS URNS QL MICRO: ABNORMAL /LPF
ERYTHROCYTE [DISTWIDTH] IN BLOOD BY AUTOMATED COUNT: 19.1 % (ref 11.5–14.5)
GLOBULIN SER CALC-MCNC: 4 G/DL (ref 2–4)
GLUCOSE SERPL-MCNC: 125 MG/DL (ref 65–100)
GLUCOSE UR STRIP.AUTO-MCNC: NEGATIVE MG/DL
HCT VFR BLD AUTO: 24.7 % (ref 36.6–50.3)
HGB BLD-MCNC: 7.7 G/DL (ref 12.1–17)
HGB UR QL STRIP: NEGATIVE
HYALINE CASTS URNS QL MICRO: ABNORMAL /LPF (ref 0–5)
INR PPP: 1 (ref 0.9–1.1)
KETONES UR QL STRIP.AUTO: NEGATIVE MG/DL
LEUKOCYTE ESTERASE UR QL STRIP.AUTO: ABNORMAL
MCH RBC QN AUTO: 32.5 PG (ref 26–34)
MCHC RBC AUTO-ENTMCNC: 31.2 G/DL (ref 30–36.5)
MCV RBC AUTO: 104.2 FL (ref 80–99)
NITRITE UR QL STRIP.AUTO: NEGATIVE
NRBC # BLD: 0 K/UL (ref 0–0.01)
NRBC BLD-RTO: 0 PER 100 WBC
PH UR STRIP: 5.5 [PH] (ref 5–8)
PLATELET # BLD AUTO: 144 K/UL (ref 150–400)
PMV BLD AUTO: 11.3 FL (ref 8.9–12.9)
POTASSIUM SERPL-SCNC: 4.8 MMOL/L (ref 3.5–5.1)
PROT SERPL-MCNC: 7.4 G/DL (ref 6.4–8.2)
PROT UR STRIP-MCNC: NEGATIVE MG/DL
PROTHROMBIN TIME: 10.7 SEC (ref 9–11.1)
RBC # BLD AUTO: 2.37 M/UL (ref 4.1–5.7)
RBC #/AREA URNS HPF: ABNORMAL /HPF (ref 0–5)
SODIUM SERPL-SCNC: 142 MMOL/L (ref 136–145)
SP GR UR REFRACTOMETRY: 1.02 (ref 1–1.03)
SPECIMEN EXP DATE BLD: NORMAL
THERAPEUTIC RANGE,PTTT: NORMAL SECS (ref 58–77)
UA: UC IF INDICATED,UAUC: ABNORMAL
UROBILINOGEN UR QL STRIP.AUTO: 0.2 EU/DL (ref 0.2–1)
WBC # BLD AUTO: 3.9 K/UL (ref 4.1–11.1)
WBC URNS QL MICRO: ABNORMAL /HPF (ref 0–4)

## 2021-02-24 PROCEDURE — 86901 BLOOD TYPING SEROLOGIC RH(D): CPT

## 2021-02-24 PROCEDURE — 85610 PROTHROMBIN TIME: CPT

## 2021-02-24 PROCEDURE — 36415 COLL VENOUS BLD VENIPUNCTURE: CPT

## 2021-02-24 PROCEDURE — 85730 THROMBOPLASTIN TIME PARTIAL: CPT

## 2021-02-24 PROCEDURE — 85027 COMPLETE CBC AUTOMATED: CPT

## 2021-02-24 PROCEDURE — 81001 URINALYSIS AUTO W/SCOPE: CPT

## 2021-02-24 PROCEDURE — 80053 COMPREHEN METABOLIC PANEL: CPT

## 2021-02-24 NOTE — PERIOP NOTES
[facility]  Preoperative Instructions        Surgery Date 3/1/2021      Time of Arrival 0925G  Contact# [phone]    1. On the day of your surgery, please report to the Surgical Services Registration Desk and sign in at your designated time. The Surgery Center is located to the right of the Emergency Room. 2. You must have someone with you to drive you home. You should not drive a car for 24 hours following surgery. Please make arrangements for a friend or family member to stay with you for the first 24 hours after your surgery. 3. Do not have anything to eat or drink (including water, gum, mints, coffee, juice) after midnight  2/28/2021 . ? This may not apply to medications prescribed by your physician. ?(Please note below the special instructions with medications to take the morning of your procedure.)    4. We recommend you do not drink any alcoholic beverages for 24 hours before and after your surgery. 5. Contact your surgeons office for instructions on the following medications: non-steroidal anti-inflammatory drugs (i.e. Advil, Aleve), vitamins, and supplements. (Some surgeons will want you to stop these medications prior to surgery and others may allow you to take them)  **If you are currently taking Plavix, Coumadin, Aspirin and/or other blood-thinning agents, contact your surgeon for instructions. ** Your surgeon will partner with the physician prescribing these medications to determine if it is safe to stop or if you need to continue taking. Please do not stop taking these medications without instructions from your surgeon    6. Wear comfortable clothes. Wear glasses instead of contacts. Do not bring any money or jewelry. Please bring picture ID, insurance card, and any prearranged co-payment or hospital payment. Do not wear make-up, particularly mascara the morning of your surgery. Do not wear nail polish, particularly if you are having foot /hand surgery. Wear your hair loose or down, no ponytails, buns, carline pins or clips. All body piercings must be removed. Please shower with antibacterial soap for three consecutive days before and on the morning of surgery, but do not apply any lotions, powders or deodorants after the shower on the day of surgery. Please use a fresh towels after each shower. Please sleep in clean clothes and change bed linens the night before surgery. Please do not shave for 48 hours prior to surgery. Shaving of the face is acceptable. 7. You should understand that if you do not follow these instructions your surgery may be cancelled. If your physical condition changes (I.e. fever, cold or flu) please contact your surgeon as soon as possible. 8. It is important that you be on time. If a situation occurs where you may be late, please call [phone] (OR Holding Area). 9. If you have any questions and or problems, please call [phone] (Pre-admission Testing). 10. Your surgery time may be subject to change. You will receive a phone call the evening prior if your time changes. 11.  If having outpatient surgery, you must have someone to drive you here, stay with you during the duration of your stay, and to drive you home at time of discharge. Special Instructions: Follow all instructions given to you by your doctor. Covid Testing 2/25/2021 arrive between 7am - 11:45am - [address] [facility] side - drive up in front of [address] [place] [address] (next to [place] [address]) under the overhang. We recommend you self quarantine from time of testing til day of your surgery. TAKE ALL MEDICATIONS DAY OF SURGERY EXCEPT:  Lisinopril-hydrochlorothiazide, metformin,  Vitamins or supplements, topical creams or gels      I understand a pre-operative phone call will be made to verify my surgery time. In the event that I am not available, I give permission for a message to be left on my answering service and/or with another person?   yes ___________________      __________   _________    (Signature of Patient)             (Witness)                (Date and Time)

## 2021-02-24 NOTE — PERIOP NOTES
Incentive Spirometer        Using the incentive spirometer helps expand the small air sacs of your lungs, helps you breathe deeply, and helps improve your lung function. Use your incentive spirometer twice a day (10 breaths each time) prior to surgery. How to Use Your Incentive Spirometer:  1. Hold the incentive spirometer in an upright position. 2. Breathe out as usual.   3. Place the mouthpiece in your mouth and seal your lips tightly around it. 4. Take a deep breath. Breathe in slowly and as deeply as possible. Keep the blue flow rate guide between the arrows. 5. Hold your breath as long as possible. Then exhale slowly and allow the piston to fall to the bottom of the column. 6. Rest for a few seconds and repeat steps one through five at least 10 times. PAT Tidal Volume______750________  x______2________  Date_______2/24/2021_________    Andie Asters THE INCENTIVE SPIROMETER WITH YOU TO THE HOSPITAL ON THE DAY OF YOUR SURGERY. Opportunity given to ask and answer questions as well as to observe return demonstration.     Patient signature_____________________________    Witness____________________________

## 2021-02-25 ENCOUNTER — HOSPITAL ENCOUNTER (OUTPATIENT)
Dept: PREADMISSION TESTING | Age: 66
Discharge: HOME | End: 2021-02-25
Payer: COMMERCIAL

## 2021-02-25 LAB — SARS-COV-2, COV2: NORMAL

## 2021-02-25 PROCEDURE — U0003 INFECTIOUS AGENT DETECTION BY NUCLEIC ACID (DNA OR RNA); SEVERE ACUTE RESPIRATORY SYNDROME CORONAVIRUS 2 (SARS-COV-2) (CORONAVIRUS DISEASE [COVID-19]), AMPLIFIED PROBE TECHNIQUE, MAKING USE OF HIGH THROUGHPUT TECHNOLOGIES AS DESCRIBED BY CMS-2020-01-R: HCPCS

## 2021-02-25 NOTE — ADVANCED PRACTICE NURSE
Results of abnormal renal functions faxed to pt's nephrologist, Dr. Gene Sterling for further recommendations prior to surgery.

## 2021-02-26 LAB — SARS-COV-2, COV2NT: NOT DETECTED

## 2021-02-26 NOTE — PERIOP NOTES
Follow up message to Dr. Karen Harris office to confirm faxed labs/message related to planned surgery. Spoke to Dr. Amber Cordoba office/ Claude Noguera of above labs sent to Dr. Yara Pérez. Nurse to  give information to Dr. Krista Faustin.

## 2021-02-26 NOTE — PERIOP NOTES
Tello Horan called from Dr Emily Machuca office (Nephrologist) and Dr Emily Machuca has reviewed labs.  No new orders

## 2021-02-28 ENCOUNTER — ANESTHESIA EVENT (OUTPATIENT)
Dept: SURGERY | Age: 66
DRG: 039 | End: 2021-02-28
Payer: COMMERCIAL

## 2021-03-01 ENCOUNTER — ANESTHESIA (OUTPATIENT)
Dept: SURGERY | Age: 66
DRG: 039 | End: 2021-03-01
Payer: COMMERCIAL

## 2021-03-01 ENCOUNTER — HOSPITAL ENCOUNTER (INPATIENT)
Age: 66
LOS: 2 days | Discharge: HOME | DRG: 039 | End: 2021-03-03
Attending: SURGERY | Admitting: SURGERY
Payer: COMMERCIAL

## 2021-03-01 ENCOUNTER — APPOINTMENT (OUTPATIENT)
Dept: GENERAL RADIOLOGY | Age: 66
DRG: 039 | End: 2021-03-01
Attending: SURGERY
Payer: COMMERCIAL

## 2021-03-01 DIAGNOSIS — I65.22 STENOSIS OF LEFT CAROTID ARTERY: Primary | ICD-10-CM

## 2021-03-01 PROBLEM — I65.29 CAROTID STENOSIS: Status: ACTIVE | Noted: 2021-03-01

## 2021-03-01 LAB
GLUCOSE BLD STRIP.AUTO-MCNC: 108 MG/DL (ref 65–100)
GLUCOSE BLD STRIP.AUTO-MCNC: 111 MG/DL (ref 65–100)
GLUCOSE BLD STRIP.AUTO-MCNC: 96 MG/DL (ref 65–100)
HGB BLD-MCNC: 8.4 G/DL (ref 12.1–17)
SERVICE CMNT-IMP: ABNORMAL
SERVICE CMNT-IMP: ABNORMAL
SERVICE CMNT-IMP: NORMAL

## 2021-03-01 PROCEDURE — 77030013567 HC DRN WND RESERV BARD -A: Performed by: SURGERY

## 2021-03-01 PROCEDURE — 74011000250 HC RX REV CODE- 250: Performed by: SURGERY

## 2021-03-01 PROCEDURE — 77030002987 HC SUT PROL J&J -B: Performed by: SURGERY

## 2021-03-01 PROCEDURE — 77030005401 HC CATH RAD ARRO -A: Performed by: ANESTHESIOLOGY

## 2021-03-01 PROCEDURE — 82962 GLUCOSE BLOOD TEST: CPT

## 2021-03-01 PROCEDURE — 77030038692 HC WND DEB SYS IRMX -B: Performed by: SURGERY

## 2021-03-01 PROCEDURE — 77030008684 HC TU ET CUF COVD -B: Performed by: ANESTHESIOLOGY

## 2021-03-01 PROCEDURE — 77030026438 HC STYL ET INTUB CARD -A: Performed by: ANESTHESIOLOGY

## 2021-03-01 PROCEDURE — 77030019908 HC STETH ESOPH SIMS -A: Performed by: ANESTHESIOLOGY

## 2021-03-01 PROCEDURE — 76010000172 HC OR TIME 2.5 TO 3 HR INTENSV-TIER 1: Performed by: SURGERY

## 2021-03-01 PROCEDURE — 74011250637 HC RX REV CODE- 250/637: Performed by: SURGERY

## 2021-03-01 PROCEDURE — 72040 X-RAY EXAM NECK SPINE 2-3 VW: CPT

## 2021-03-01 PROCEDURE — C1768 GRAFT, VASCULAR: HCPCS | Performed by: SURGERY

## 2021-03-01 PROCEDURE — 74011250636 HC RX REV CODE- 250/636: Performed by: SURGERY

## 2021-03-01 PROCEDURE — 77030014008 HC SPNG HEMSTAT J&J -C: Performed by: SURGERY

## 2021-03-01 PROCEDURE — 88304 TISSUE EXAM BY PATHOLOGIST: CPT

## 2021-03-01 PROCEDURE — 03CN0ZZ EXTIRPATION OF MATTER FROM LEFT EXTERNAL CAROTID ARTERY, OPEN APPROACH: ICD-10-PCS | Performed by: SURGERY

## 2021-03-01 PROCEDURE — 74011250636 HC RX REV CODE- 250/636: Performed by: NURSE ANESTHETIST, CERTIFIED REGISTERED

## 2021-03-01 PROCEDURE — 76210000017 HC OR PH I REC 1.5 TO 2 HR: Performed by: SURGERY

## 2021-03-01 PROCEDURE — 2709999900 HC NON-CHARGEABLE SUPPLY: Performed by: SURGERY

## 2021-03-01 PROCEDURE — 77030012406 HC DRN WND PENRS BARD -A: Performed by: SURGERY

## 2021-03-01 PROCEDURE — 03UJ0KZ SUPPLEMENT LEFT COMMON CAROTID ARTERY WITH NONAUTOLOGOUS TISSUE SUBSTITUTE, OPEN APPROACH: ICD-10-PCS | Performed by: SURGERY

## 2021-03-01 PROCEDURE — 65660000000 HC RM CCU STEPDOWN

## 2021-03-01 PROCEDURE — 77030002933 HC SUT MCRYL J&J -A: Performed by: SURGERY

## 2021-03-01 PROCEDURE — 76060000036 HC ANESTHESIA 2.5 TO 3 HR: Performed by: SURGERY

## 2021-03-01 PROCEDURE — 77030002986 HC SUT PROL J&J -A: Performed by: SURGERY

## 2021-03-01 PROCEDURE — 77030002916 HC SUT ETHLN J&J -A: Performed by: SURGERY

## 2021-03-01 PROCEDURE — 74011000258 HC RX REV CODE- 258: Performed by: NURSE ANESTHETIST, CERTIFIED REGISTERED

## 2021-03-01 PROCEDURE — 85018 HEMOGLOBIN: CPT

## 2021-03-01 PROCEDURE — 77030031139 HC SUT VCRL2 J&J -A: Performed by: SURGERY

## 2021-03-01 PROCEDURE — 74011000250 HC RX REV CODE- 250: Performed by: NURSE ANESTHETIST, CERTIFIED REGISTERED

## 2021-03-01 PROCEDURE — 03CL0ZZ EXTIRPATION OF MATTER FROM LEFT INTERNAL CAROTID ARTERY, OPEN APPROACH: ICD-10-PCS | Performed by: SURGERY

## 2021-03-01 PROCEDURE — 03CJ0ZZ EXTIRPATION OF MATTER FROM LEFT COMMON CAROTID ARTERY, OPEN APPROACH: ICD-10-PCS | Performed by: SURGERY

## 2021-03-01 DEVICE — PATCH VASC W0.8XL8CM PERIPH BOV PERICARD N PVC N DEHP CRSS: Type: IMPLANTABLE DEVICE | Site: NECK | Status: FUNCTIONAL

## 2021-03-01 RX ORDER — NEOSTIGMINE METHYLSULFATE 1 MG/ML
INJECTION, SOLUTION INTRAVENOUS AS NEEDED
Status: DISCONTINUED | OUTPATIENT
Start: 2021-03-01 | End: 2021-03-01 | Stop reason: HOSPADM

## 2021-03-01 RX ORDER — ONDANSETRON 2 MG/ML
INJECTION INTRAMUSCULAR; INTRAVENOUS AS NEEDED
Status: DISCONTINUED | OUTPATIENT
Start: 2021-03-01 | End: 2021-03-01 | Stop reason: HOSPADM

## 2021-03-01 RX ORDER — SODIUM CHLORIDE 0.9 % (FLUSH) 0.9 %
5-40 SYRINGE (ML) INJECTION EVERY 8 HOURS
Status: DISCONTINUED | OUTPATIENT
Start: 2021-03-01 | End: 2021-03-03 | Stop reason: HOSPADM

## 2021-03-01 RX ORDER — SODIUM CHLORIDE, SODIUM LACTATE, POTASSIUM CHLORIDE, CALCIUM CHLORIDE 600; 310; 30; 20 MG/100ML; MG/100ML; MG/100ML; MG/100ML
25 INJECTION, SOLUTION INTRAVENOUS CONTINUOUS
Status: DISCONTINUED | OUTPATIENT
Start: 2021-03-01 | End: 2021-03-01 | Stop reason: HOSPADM

## 2021-03-01 RX ORDER — ACETAMINOPHEN 650 MG/1
650 SUPPOSITORY RECTAL
Status: DISCONTINUED | OUTPATIENT
Start: 2021-03-01 | End: 2021-03-03 | Stop reason: HOSPADM

## 2021-03-01 RX ORDER — ATORVASTATIN CALCIUM 20 MG/1
20 TABLET, FILM COATED ORAL DAILY
Status: DISCONTINUED | OUTPATIENT
Start: 2021-03-02 | End: 2021-03-03 | Stop reason: HOSPADM

## 2021-03-01 RX ORDER — ETOMIDATE 2 MG/ML
INJECTION INTRAVENOUS AS NEEDED
Status: DISCONTINUED | OUTPATIENT
Start: 2021-03-01 | End: 2021-03-01

## 2021-03-01 RX ORDER — SODIUM CHLORIDE 0.9 % (FLUSH) 0.9 %
5-40 SYRINGE (ML) INJECTION AS NEEDED
Status: DISCONTINUED | OUTPATIENT
Start: 2021-03-01 | End: 2021-03-03 | Stop reason: HOSPADM

## 2021-03-01 RX ORDER — HEPARIN SODIUM 1000 [USP'U]/ML
INJECTION, SOLUTION INTRAVENOUS; SUBCUTANEOUS AS NEEDED
Status: DISCONTINUED | OUTPATIENT
Start: 2021-03-01 | End: 2021-03-01 | Stop reason: HOSPADM

## 2021-03-01 RX ORDER — LIDOCAINE HYDROCHLORIDE 20 MG/ML
INJECTION, SOLUTION EPIDURAL; INFILTRATION; INTRACAUDAL; PERINEURAL AS NEEDED
Status: DISCONTINUED | OUTPATIENT
Start: 2021-03-01 | End: 2021-03-01 | Stop reason: HOSPADM

## 2021-03-01 RX ORDER — PHENYLEPHRINE HCL IN 0.9% NACL 0.4MG/10ML
SYRINGE (ML) INTRAVENOUS AS NEEDED
Status: DISCONTINUED | OUTPATIENT
Start: 2021-03-01 | End: 2021-03-01 | Stop reason: HOSPADM

## 2021-03-01 RX ORDER — AMOXICILLIN 250 MG
1 CAPSULE ORAL DAILY
Status: DISCONTINUED | OUTPATIENT
Start: 2021-03-02 | End: 2021-03-03 | Stop reason: HOSPADM

## 2021-03-01 RX ORDER — LIDOCAINE HYDROCHLORIDE 10 MG/ML
INJECTION INFILTRATION; PERINEURAL AS NEEDED
Status: DISCONTINUED | OUTPATIENT
Start: 2021-03-01 | End: 2021-03-01 | Stop reason: HOSPADM

## 2021-03-01 RX ORDER — HYDROMORPHONE HYDROCHLORIDE 1 MG/ML
0.2 INJECTION, SOLUTION INTRAMUSCULAR; INTRAVENOUS; SUBCUTANEOUS
Status: DISCONTINUED | OUTPATIENT
Start: 2021-03-01 | End: 2021-03-01 | Stop reason: HOSPADM

## 2021-03-01 RX ORDER — TAMSULOSIN HYDROCHLORIDE 0.4 MG/1
0.4 CAPSULE ORAL DAILY
Status: DISCONTINUED | OUTPATIENT
Start: 2021-03-02 | End: 2021-03-03 | Stop reason: HOSPADM

## 2021-03-01 RX ORDER — FENTANYL CITRATE 50 UG/ML
25 INJECTION, SOLUTION INTRAMUSCULAR; INTRAVENOUS
Status: DISCONTINUED | OUTPATIENT
Start: 2021-03-01 | End: 2021-03-01 | Stop reason: HOSPADM

## 2021-03-01 RX ORDER — FENTANYL CITRATE 50 UG/ML
INJECTION, SOLUTION INTRAMUSCULAR; INTRAVENOUS AS NEEDED
Status: DISCONTINUED | OUTPATIENT
Start: 2021-03-01 | End: 2021-03-01 | Stop reason: HOSPADM

## 2021-03-01 RX ORDER — LIDOCAINE HYDROCHLORIDE 10 MG/ML
0.1 INJECTION, SOLUTION EPIDURAL; INFILTRATION; INTRACAUDAL; PERINEURAL AS NEEDED
Status: DISCONTINUED | OUTPATIENT
Start: 2021-03-01 | End: 2021-03-01 | Stop reason: HOSPADM

## 2021-03-01 RX ORDER — NALOXONE HYDROCHLORIDE 0.4 MG/ML
0.4 INJECTION, SOLUTION INTRAMUSCULAR; INTRAVENOUS; SUBCUTANEOUS AS NEEDED
Status: DISCONTINUED | OUTPATIENT
Start: 2021-03-01 | End: 2021-03-03 | Stop reason: HOSPADM

## 2021-03-01 RX ORDER — EPHEDRINE SULFATE/0.9% NACL/PF 50 MG/5 ML
SYRINGE (ML) INTRAVENOUS AS NEEDED
Status: DISCONTINUED | OUTPATIENT
Start: 2021-03-01 | End: 2021-03-01 | Stop reason: HOSPADM

## 2021-03-01 RX ORDER — SODIUM CHLORIDE, SODIUM LACTATE, POTASSIUM CHLORIDE, CALCIUM CHLORIDE 600; 310; 30; 20 MG/100ML; MG/100ML; MG/100ML; MG/100ML
25 INJECTION, SOLUTION INTRAVENOUS ONCE
Status: DISCONTINUED | OUTPATIENT
Start: 2021-03-01 | End: 2021-03-01 | Stop reason: HOSPADM

## 2021-03-01 RX ORDER — SODIUM CHLORIDE 9 MG/ML
75 INJECTION, SOLUTION INTRAVENOUS CONTINUOUS
Status: DISCONTINUED | OUTPATIENT
Start: 2021-03-01 | End: 2021-03-02

## 2021-03-01 RX ORDER — GEMFIBROZIL 600 MG/1
600 TABLET, FILM COATED ORAL 2 TIMES DAILY
Status: DISCONTINUED | OUTPATIENT
Start: 2021-03-01 | End: 2021-03-03 | Stop reason: HOSPADM

## 2021-03-01 RX ORDER — IBUPROFEN 200 MG
1 TABLET ORAL EVERY 24 HOURS
Status: DISCONTINUED | OUTPATIENT
Start: 2021-03-01 | End: 2021-03-03 | Stop reason: HOSPADM

## 2021-03-01 RX ORDER — DILTIAZEM HYDROCHLORIDE 120 MG/1
120 CAPSULE, COATED, EXTENDED RELEASE ORAL DAILY
Status: DISCONTINUED | OUTPATIENT
Start: 2021-03-02 | End: 2021-03-03 | Stop reason: HOSPADM

## 2021-03-01 RX ORDER — SODIUM CHLORIDE 9 MG/ML
INJECTION, SOLUTION INTRAVENOUS
Status: DISCONTINUED | OUTPATIENT
Start: 2021-03-01 | End: 2021-03-01 | Stop reason: HOSPADM

## 2021-03-01 RX ORDER — FAMOTIDINE 20 MG/1
40 TABLET, FILM COATED ORAL DAILY
Status: DISCONTINUED | OUTPATIENT
Start: 2021-03-02 | End: 2021-03-03 | Stop reason: HOSPADM

## 2021-03-01 RX ORDER — ASPIRIN 81 MG/1
81 TABLET ORAL DAILY
Status: DISCONTINUED | OUTPATIENT
Start: 2021-03-02 | End: 2021-03-03 | Stop reason: HOSPADM

## 2021-03-01 RX ORDER — ROCURONIUM BROMIDE 10 MG/ML
INJECTION, SOLUTION INTRAVENOUS AS NEEDED
Status: DISCONTINUED | OUTPATIENT
Start: 2021-03-01 | End: 2021-03-01 | Stop reason: HOSPADM

## 2021-03-01 RX ORDER — ETOMIDATE 2 MG/ML
INJECTION INTRAVENOUS AS NEEDED
Status: DISCONTINUED | OUTPATIENT
Start: 2021-03-01 | End: 2021-03-01 | Stop reason: HOSPADM

## 2021-03-01 RX ORDER — SODIUM CHLORIDE 0.9 % (FLUSH) 0.9 %
5-40 SYRINGE (ML) INJECTION AS NEEDED
Status: DISCONTINUED | OUTPATIENT
Start: 2021-03-01 | End: 2021-03-01 | Stop reason: HOSPADM

## 2021-03-01 RX ORDER — LANOLIN ALCOHOL/MO/W.PET/CERES
325 CREAM (GRAM) TOPICAL
Status: DISCONTINUED | OUTPATIENT
Start: 2021-03-02 | End: 2021-03-03 | Stop reason: HOSPADM

## 2021-03-01 RX ORDER — SODIUM CHLORIDE 0.9 % (FLUSH) 0.9 %
5-40 SYRINGE (ML) INJECTION EVERY 8 HOURS
Status: DISCONTINUED | OUTPATIENT
Start: 2021-03-01 | End: 2021-03-01 | Stop reason: HOSPADM

## 2021-03-01 RX ORDER — MORPHINE SULFATE 2 MG/ML
2 INJECTION, SOLUTION INTRAMUSCULAR; INTRAVENOUS
Status: DISCONTINUED | OUTPATIENT
Start: 2021-03-01 | End: 2021-03-03 | Stop reason: HOSPADM

## 2021-03-01 RX ORDER — HYDROCODONE BITARTRATE AND ACETAMINOPHEN 5; 325 MG/1; MG/1
1 TABLET ORAL
Status: DISCONTINUED | OUTPATIENT
Start: 2021-03-01 | End: 2021-03-03 | Stop reason: HOSPADM

## 2021-03-01 RX ORDER — GLYCOPYRROLATE 0.2 MG/ML
INJECTION INTRAMUSCULAR; INTRAVENOUS AS NEEDED
Status: DISCONTINUED | OUTPATIENT
Start: 2021-03-01 | End: 2021-03-01 | Stop reason: HOSPADM

## 2021-03-01 RX ORDER — ACETAMINOPHEN 325 MG/1
650 TABLET ORAL
Status: DISCONTINUED | OUTPATIENT
Start: 2021-03-01 | End: 2021-03-03 | Stop reason: HOSPADM

## 2021-03-01 RX ORDER — DIPHENHYDRAMINE HYDROCHLORIDE 50 MG/ML
12.5 INJECTION, SOLUTION INTRAMUSCULAR; INTRAVENOUS AS NEEDED
Status: DISCONTINUED | OUTPATIENT
Start: 2021-03-01 | End: 2021-03-01 | Stop reason: HOSPADM

## 2021-03-01 RX ORDER — PANTOPRAZOLE SODIUM 40 MG/1
40 TABLET, DELAYED RELEASE ORAL DAILY
Status: DISCONTINUED | OUTPATIENT
Start: 2021-03-02 | End: 2021-03-03 | Stop reason: HOSPADM

## 2021-03-01 RX ORDER — ONDANSETRON 2 MG/ML
4 INJECTION INTRAMUSCULAR; INTRAVENOUS
Status: DISCONTINUED | OUTPATIENT
Start: 2021-03-01 | End: 2021-03-03 | Stop reason: HOSPADM

## 2021-03-01 RX ORDER — PHENYLEPHRINE HYDROCHLORIDE 10 MG/ML
INJECTION INTRAVENOUS
Status: DISPENSED
Start: 2021-03-01 | End: 2021-03-02

## 2021-03-01 RX ORDER — MIDAZOLAM HYDROCHLORIDE 1 MG/ML
INJECTION, SOLUTION INTRAMUSCULAR; INTRAVENOUS AS NEEDED
Status: DISCONTINUED | OUTPATIENT
Start: 2021-03-01 | End: 2021-03-01 | Stop reason: HOSPADM

## 2021-03-01 RX ADMIN — HEPARIN SODIUM 5000 UNITS: 1000 INJECTION, SOLUTION INTRAVENOUS; SUBCUTANEOUS at 09:19

## 2021-03-01 RX ADMIN — FENTANYL CITRATE 50 MCG: 50 INJECTION, SOLUTION INTRAMUSCULAR; INTRAVENOUS at 08:51

## 2021-03-01 RX ADMIN — SODIUM CHLORIDE 75 ML/HR: 9 INJECTION, SOLUTION INTRAVENOUS at 12:06

## 2021-03-01 RX ADMIN — CEFAZOLIN SODIUM 2 G: 1 INJECTION, POWDER, FOR SOLUTION INTRAMUSCULAR; INTRAVENOUS at 17:37

## 2021-03-01 RX ADMIN — DEXTROSE MONOHYDRATE 2 MG: 50 INJECTION, SOLUTION INTRAVENOUS at 10:35

## 2021-03-01 RX ADMIN — Medication 80 MCG: at 10:40

## 2021-03-01 RX ADMIN — Medication 3 MG: at 10:44

## 2021-03-01 RX ADMIN — ROCURONIUM BROMIDE 10 MG: 10 INJECTION INTRAVENOUS at 10:00

## 2021-03-01 RX ADMIN — ROCURONIUM BROMIDE 30 MG: 10 INJECTION INTRAVENOUS at 08:46

## 2021-03-01 RX ADMIN — Medication 10 ML: at 21:51

## 2021-03-01 RX ADMIN — Medication 80 MCG: at 08:50

## 2021-03-01 RX ADMIN — LIDOCAINE HYDROCHLORIDE 80 MG: 20 INJECTION, SOLUTION INTRAVENOUS at 08:46

## 2021-03-01 RX ADMIN — PHENYLEPHRINE HYDROCHLORIDE 35 MCG/MIN: 10 INJECTION INTRAVENOUS at 19:36

## 2021-03-01 RX ADMIN — PHENYLEPHRINE HYDROCHLORIDE 30 MCG/MIN: 10 INJECTION INTRAVENOUS at 12:06

## 2021-03-01 RX ADMIN — Medication 10 MG: at 10:40

## 2021-03-01 RX ADMIN — SODIUM CHLORIDE 60 MCG/MIN: 900 INJECTION, SOLUTION INTRAVENOUS at 08:52

## 2021-03-01 RX ADMIN — LIDOCAINE HYDROCHLORIDE 100 MG: 20 INJECTION, SOLUTION INTRAVENOUS at 11:06

## 2021-03-01 RX ADMIN — SODIUM CHLORIDE 50 MCG: 900 INJECTION, SOLUTION INTRAVENOUS at 09:05

## 2021-03-01 RX ADMIN — Medication 10 MG: at 09:04

## 2021-03-01 RX ADMIN — ETOMIDATE 20 MG: 2 INJECTION, SOLUTION INTRAVENOUS at 08:46

## 2021-03-01 RX ADMIN — WATER 2 G: 1 INJECTION INTRAMUSCULAR; INTRAVENOUS; SUBCUTANEOUS at 08:53

## 2021-03-01 RX ADMIN — ROCURONIUM BROMIDE 20 MG: 10 INJECTION INTRAVENOUS at 08:50

## 2021-03-01 RX ADMIN — MIDAZOLAM HYDROCHLORIDE 1 MG: 1 INJECTION, SOLUTION INTRAMUSCULAR; INTRAVENOUS at 07:31

## 2021-03-01 RX ADMIN — FENTANYL CITRATE 25 MCG: 50 INJECTION, SOLUTION INTRAMUSCULAR; INTRAVENOUS at 07:39

## 2021-03-01 RX ADMIN — SODIUM CHLORIDE: 900 INJECTION, SOLUTION INTRAVENOUS at 07:00

## 2021-03-01 RX ADMIN — GLYCOPYRROLATE 0.4 MG: 0.2 INJECTION, SOLUTION INTRAMUSCULAR; INTRAVENOUS at 10:44

## 2021-03-01 RX ADMIN — FENTANYL CITRATE 50 MCG: 50 INJECTION, SOLUTION INTRAMUSCULAR; INTRAVENOUS at 08:46

## 2021-03-01 RX ADMIN — Medication 80 MCG: at 08:55

## 2021-03-01 RX ADMIN — MIDAZOLAM HYDROCHLORIDE 1 MG: 1 INJECTION, SOLUTION INTRAMUSCULAR; INTRAVENOUS at 07:36

## 2021-03-01 RX ADMIN — FENTANYL CITRATE 25 MCG: 50 INJECTION, SOLUTION INTRAMUSCULAR; INTRAVENOUS at 07:45

## 2021-03-01 RX ADMIN — FENTANYL CITRATE 50 MCG: 50 INJECTION, SOLUTION INTRAMUSCULAR; INTRAVENOUS at 10:35

## 2021-03-01 RX ADMIN — ETOMIDATE 10 MG: 2 INJECTION, SOLUTION INTRAVENOUS at 08:50

## 2021-03-01 RX ADMIN — GEMFIBROZIL 600 MG: 600 TABLET ORAL at 18:08

## 2021-03-01 RX ADMIN — ONDANSETRON HYDROCHLORIDE 4 MG: 2 INJECTION, SOLUTION INTRAMUSCULAR; INTRAVENOUS at 09:53

## 2021-03-01 NOTE — PERIOP NOTES
8:11 AM Irrisept Wound Debridement and Cleansing System  Ref: Indio Romero: [id number] LOT: [id number] Expiration Date: 10/31/2021 used PRN for irrigation    10:25 AM Missing suture noted by Dr. Angie Deutsch. X-ray made aware and orders are in place. Room was searched and count was performed     10:50 AM Xray being performed    11:00 AM X-ray being sent to radiologist Dr. John Paulino      11:14 AM X-ray reviewed by Dr. John Paulino. No suture found.  Proceeding to extubate the patient at this time

## 2021-03-01 NOTE — PERIOP NOTES
Handoff Report from Operating Room to PACU    Report received from MEG Frances and Desmond Parker CRNA regarding Chata Och. Surgeon(s):  Marisel Kelsey MD  And Procedure(s) (LRB):  LEFT CAROTID ARTERY ENDARTERECTOMY (Left)  confirmed   with allergies, drains and dressings discussed. Anesthesia type, drugs, patient history, complications, estimated blood loss, vital signs, intake and output, and last pain medication, lines, reversal medications and temperature were reviewed.

## 2021-03-01 NOTE — PERIOP NOTES
Patient dressing with sanguinous drainage around lower portion of dressing, dressing removed and biopatch around VINOD drain saturated with sanguinous drainage, steri strips dry and intact, no swelling noted in neck or around incision, placed 4x4 and tape over site, patient tolerated without difficulty    1495  TRANSFER - OUT REPORT:    Verbal report given to ZION Oliva on Kiya Juarez  being transferred to PCU, 3974 for routine progression of care       Report consisted of patients Situation, Background, Assessment and   Recommendations(SBAR). Information from the following report(s) SBAR, Kardex, OR Summary, Intake/Output, MAR and Cardiac Rhythm sinus vaibhav was reviewed with the receiving nurse. Lines:   Peripheral IV 03/01/21 Left; Inner Forearm (Active)   Site Assessment Clean, dry, & intact 03/01/21 1300   Phlebitis Assessment 0 03/01/21 1300   Infiltration Assessment 0 03/01/21 1300   Dressing Status Clean, dry, & intact 03/01/21 1300   Dressing Type Tape;Transparent 03/01/21 1300   Hub Color/Line Status Pink; Infusing 03/01/21 1300       Peripheral IV 03/01/21 Left Forearm (Active)   Site Assessment Clean, dry, & intact 03/01/21 1300   Phlebitis Assessment 0 03/01/21 1300   Infiltration Assessment 0 03/01/21 1300   Dressing Status Clean, dry, & intact 03/01/21 1300   Dressing Type Tape;Transparent 03/01/21 1300   Hub Color/Line Status Pink; Infusing 03/01/21 1300        Opportunity for questions and clarification was provided. Patient transported with:   Monitor  O2 @ 2 liters  Registered Nurse  Tech     Updated patient's family at 57 and at time of transfer.

## 2021-03-01 NOTE — ANESTHESIA PREPROCEDURE EVALUATION
Anesthetic History   No history of anesthetic complications            Review of Systems / Medical History  Patient summary reviewed, nursing notes reviewed and pertinent labs reviewed    Pulmonary    COPD: mild      Smoker  Asthma        Neuro/Psych   Within defined limits           Cardiovascular    Hypertension        Dysrhythmias : atrial fibrillation  CAD and hyperlipidemia    Exercise tolerance: >4 METS  Comments: Left Carotid Artery Stenosis    ECG (2/5/21): Sinus  Rhythm   -Incomplete left bundle branch block. ABNORMAL     TTE (2/19/19):  ·Estimated left ventricular ejection fraction is 56 - 60%. Biplane method used to measure ejection fraction. Left ventricular cavity size is decreased. Left ventricular moderate concentric hypertrophy. Normal left ventricular wall motion, no regional wall motion abnormality noted. ·Right ventricular cavity size is mildly dilated. ·Right atrial cavity size is mildly dilated. ·Mild tricuspid valve regurgitation is present. Pulmonary hypertension is present. GI/Hepatic/Renal         Renal disease: CRI      Comments: CRI, Stage III-IV    H/O GI Bleed Endo/Other    Diabetes: well controlled, type 2, using insulin    Obesity, arthritis and anemia    Comments: Thrombocytopenia Other Findings   Comments: Chronic pain     S/P Right STARLA (2/11/19) and revision (2/21/19)           Physical Exam    Airway  Mallampati: II    Neck ROM: normal range of motion   Mouth opening: Normal     Cardiovascular    Rhythm: regular  Rate: normal         Dental    Dentition: Poor dentition  Comments: Few remaining teeth, none loose.    Pulmonary  Breath sounds clear to auscultation               Abdominal  GI exam deferred       Other Findings            Anesthetic Plan    ASA: 3  Anesthesia type: general    Monitoring Plan: BIS and Arterial line      Induction: Intravenous  Anesthetic plan and risks discussed with: Patient

## 2021-03-01 NOTE — PERIOP NOTES
9756 - PT'S COVID TEST RESULTED NEG - PT STATES HAS QUARANTINED SINCE TESTING. DENIES S/S OF COVID - NO FEVER, COLD, SOB, N/V, DIARRHEA - PT DOES HAVE CHRONIC SMOKERS COUGH. PRE-OP TCHING DONE - PT VERBALIZES UNDERSTANDING. STRETCHER IN LOWEST POSITION, CB IN PLACE AND SR UP X2.

## 2021-03-01 NOTE — PROGRESS NOTES
Transition of Care Plan:    Disposition: Home with family  Follow up appointments: PCP, Vascular Surgery  DME needed: Pt has rolling walker, cane, wheelchair  Transportation at Discharge: Family   [place] or means to access home:  Dtr in law is transporting him and dtr in law will access to the house      IM Medicare letter: N/A due to pt having [address] Contact:Naty Mcgheedrt-in-law [phone]  Discharge Caregiver contacted prior to discharge? CM will contacted prior to d/c              Reason for Admission:   Left CEA with BOVINE Patch                   RUR Score:   12%               Plan for utilizing home health:   TBD      PCP: First and Last name:  Don Allison Name of Practice:    Are you a current patient: Yes/No: Yes   Approximate date of last visit: 2021   Can you participate in a virtual visit with your PCP:               Current Advanced Directive/Advance Care Plan: Stewart Hinton (ACP) Conversation      Date of Conversation: 03/1/2021  Conducted with: Patient with Decision Making Capacity    Healthcare Decision Maker:     Primary Decision Maker: Raji Shukla - Other Relative - [phone]    Secondary Decision Maker: [de-identified] - Daughter - [phone]    Supplemental (Other) Decision Maker: Little Chang - Son - [phone]  Click here to [address] including selection of the Healthcare Decision Maker Relationship (ie \"Primary\")  Today we documented Decision Maker(s). The patient will provide ACP documents. Content/Action Overview:    Has ACP document(s) NOT on file - requested patient to provide  Reviewed DNR/DNI and patient elects Full Code (Attempt Resuscitation)     Length of Voluntary ACP Conversation in minutes:  <16 minutes (Non-Billable)    Zarina Russ                              Transition of Care Plan:                      Pt is a 72 yo male who was admitted to [facility] due to having a procedure of left CEA with bovine patch. CM confirmed demographics with pt. Pt lives with his son, dtr-in-law, and grandchildren in an one story home with three steps to enter. He has a rolling walker, cane and a wheelchair. He has had a hx of using home health. No hx of using outpatient rehab or being admitted to a SNF or inpatient rehab. Pt is independent in his ADLs and IADLs. Pt drives himself to his medical appointments. Pt uses [address] At the time of d/c pt's dtr-in-law will transport. CM will continue to follow and assist with d/c planning. Care Management Interventions  PCP Verified by CM: Yes(Dr. Angel Munguia)  Mode of Transport at Discharge: Other (see comment)(Family will transport at the time of d/c)  Transition of Care Consult (CM Consult): Discharge Planning(Home with family)  Discharge Durable Medical Equipment: No(Has a rolling walker, wheelchair, and cane)  Physical Therapy Consult: No  Occupational Therapy Consult: No  Speech Therapy Consult: No  Current Support Network: Relative's Home(Family is very supportive and involved.)  Confirm Follow Up Transport: Self(Pt drives himself to his medical appointments)  Discharge Location  Discharge Placement: Home with family assistance    [place], [place].   Care Manager [id number] [address] [facility]  [phone]

## 2021-03-01 NOTE — ANESTHESIA POSTPROCEDURE EVALUATION
Procedure(s):  LEFT CAROTID ARTERY ENDARTERECTOMY. general    Anesthesia Post Evaluation        Patient location during evaluation: PACU  Note status: Adequate. Level of consciousness: responsive to verbal stimuli and sleepy but conscious  Pain management: satisfactory to patient  Airway patency: patent  Anesthetic complications: no  Cardiovascular status: acceptable  Respiratory status: acceptable  Hydration status: acceptable  Comments: +Post-Anesthesia Evaluation and Assessment    Patient: iHro Mckeon MRN: [id number]  SSN: [id number]   YOB: 1955  Age: 73 y.o. Sex: male      Cardiovascular Function/Vital Signs    /65 (BP 1 Location: Right arm, BP Patient Position: At rest)   Pulse 65   Temp 36.5 °C (97.7 °F)   Resp 16   Ht 5' 6\" (1.676 m)   Wt 85.7 kg (188 lb 15 oz)   SpO2 96%   BMI 30.49 kg/m²     Patient is status post Procedure(s):  LEFT CAROTID ARTERY ENDARTERECTOMY. Nausea/Vomiting: Controlled. Postoperative hydration reviewed and adequate. Pain:  Pain Scale 1: Numeric (0 - 10) (03/01/21 1145)  Pain Intensity 1: 0 (03/01/21 1145)   Managed. Neurological Status:   Neuro (WDL): Exceptions to WDL (03/01/21 1145)   At baseline. Mental Status and Level of Consciousness: Arousable. Pulmonary Status:   O2 Device: Nasal cannula (03/01/21 1200)   Adequate oxygenation and airway patent. Complications related to anesthesia: None    Post-anesthesia assessment completed. No concerns. Signed By: Racquel Manuel MD    3/1/2021  Post anesthesia nausea and vomiting:  controlled      INITIAL Post-op Vital signs:   Vitals Value Taken Time   BP 98/49 03/01/21 1215   Temp 36.5 °C (97.7 °F) 03/01/21 1136   Pulse 58 03/01/21 1217   Resp 14 03/01/21 1217   SpO2 97 % 03/01/21 1217   Vitals shown include unvalidated device data.

## 2021-03-01 NOTE — BRIEF OP NOTE
Brief Postoperative Note    Patient: Rashida Rasheed  YOB: 1955  MRN: [id number]    Date of Procedure: 3/1/2021     Pre-Op Diagnosis: LEFT CAROTID STENOSIS    Post-Op Diagnosis: same      Procedure(s): LEFT CEA w/BOVINE PATCH     Surgeon(s):  Franne Boeck, MD    Surgical Assistant: Surg Asst-1: Selene Cotter    Anesthesia: General     Estimated Blood Loss (mL): minimal    Complications: none    Specimens:   ID Type Source Tests Collected by Time Destination   1 : left carotid plaque Preservative Neck  Franne Boeck, MD 3/1/2021 1049 Pathology        Implants:   Implant Name Type Inv.  Item Serial No.  Lot No. LRB No. Used Action   PATCH VASC W0.8XL8CM PERIPH BOV PERICARD N PVC N DEHP CRSS - SN/A  PATCH VASC W0.8XL8CM PERIPH BOV PERICARD N PVC N DEHP CRSS N/A YE [employer]OS[employer]_WD [id number] Left 1 Implanted       Drains: 10mm VINOD    Findings: small internal carotid     Electronically Signed by Hannah Lucas MD on 3/1/2021 at 11:15 AM

## 2021-03-02 LAB
ANION GAP SERPL CALC-SCNC: 6 MMOL/L (ref 5–15)
BUN SERPL-MCNC: 28 MG/DL (ref 6–20)
BUN/CREAT SERPL: 16 (ref 12–20)
CALCIUM SERPL-MCNC: 7.6 MG/DL (ref 8.5–10.1)
CHLORIDE SERPL-SCNC: 111 MMOL/L (ref 97–108)
CO2 SERPL-SCNC: 22 MMOL/L (ref 21–32)
CREAT SERPL-MCNC: 1.74 MG/DL (ref 0.7–1.3)
ERYTHROCYTE [DISTWIDTH] IN BLOOD BY AUTOMATED COUNT: 19.1 % (ref 11.5–14.5)
GLUCOSE BLD STRIP.AUTO-MCNC: 144 MG/DL (ref 65–100)
GLUCOSE BLD STRIP.AUTO-MCNC: 153 MG/DL (ref 65–100)
GLUCOSE SERPL-MCNC: 102 MG/DL (ref 65–100)
HCT VFR BLD AUTO: 25.3 % (ref 36.6–50.3)
HGB BLD-MCNC: 7.5 G/DL (ref 12.1–17)
MCH RBC QN AUTO: 31.9 PG (ref 26–34)
MCHC RBC AUTO-ENTMCNC: 29.6 G/DL (ref 30–36.5)
MCV RBC AUTO: 107.7 FL (ref 80–99)
NRBC # BLD: 0 K/UL (ref 0–0.01)
NRBC BLD-RTO: 0 PER 100 WBC
PLATELET # BLD AUTO: 132 K/UL (ref 150–400)
PMV BLD AUTO: 11.9 FL (ref 8.9–12.9)
POTASSIUM SERPL-SCNC: 4.4 MMOL/L (ref 3.5–5.1)
RBC # BLD AUTO: 2.35 M/UL (ref 4.1–5.7)
SERVICE CMNT-IMP: ABNORMAL
SERVICE CMNT-IMP: ABNORMAL
SODIUM SERPL-SCNC: 139 MMOL/L (ref 136–145)
WBC # BLD AUTO: 6.1 K/UL (ref 4.1–11.1)

## 2021-03-02 PROCEDURE — 74011250636 HC RX REV CODE- 250/636: Performed by: SURGERY

## 2021-03-02 PROCEDURE — 36415 COLL VENOUS BLD VENIPUNCTURE: CPT

## 2021-03-02 PROCEDURE — 65660000000 HC RM CCU STEPDOWN

## 2021-03-02 PROCEDURE — 80048 BASIC METABOLIC PNL TOTAL CA: CPT

## 2021-03-02 PROCEDURE — 74011000250 HC RX REV CODE- 250: Performed by: SURGERY

## 2021-03-02 PROCEDURE — 77010033678 HC OXYGEN DAILY

## 2021-03-02 PROCEDURE — 74011250636 HC RX REV CODE- 250/636: Performed by: NURSE PRACTITIONER

## 2021-03-02 PROCEDURE — 74011250637 HC RX REV CODE- 250/637: Performed by: SURGERY

## 2021-03-02 PROCEDURE — 82962 GLUCOSE BLOOD TEST: CPT

## 2021-03-02 PROCEDURE — 85027 COMPLETE CBC AUTOMATED: CPT

## 2021-03-02 RX ORDER — SODIUM CHLORIDE 9 MG/ML
100 INJECTION, SOLUTION INTRAVENOUS CONTINUOUS
Status: DISCONTINUED | OUTPATIENT
Start: 2021-03-02 | End: 2021-03-03

## 2021-03-02 RX ADMIN — GEMFIBROZIL 600 MG: 600 TABLET ORAL at 17:18

## 2021-03-02 RX ADMIN — FAMOTIDINE 40 MG: 20 TABLET, FILM COATED ORAL at 08:46

## 2021-03-02 RX ADMIN — PHENYLEPHRINE HYDROCHLORIDE 35 MCG/MIN: 10 INJECTION INTRAVENOUS at 01:38

## 2021-03-02 RX ADMIN — ASPIRIN 81 MG: 81 TABLET, COATED ORAL at 08:46

## 2021-03-02 RX ADMIN — SODIUM CHLORIDE 100 ML/HR: 9 INJECTION, SOLUTION INTRAVENOUS at 17:19

## 2021-03-02 RX ADMIN — APIXABAN 5 MG: 5 TABLET, FILM COATED ORAL at 17:18

## 2021-03-02 RX ADMIN — Medication 10 ML: at 22:28

## 2021-03-02 RX ADMIN — CEFAZOLIN SODIUM 2 G: 1 INJECTION, POWDER, FOR SOLUTION INTRAMUSCULAR; INTRAVENOUS at 01:43

## 2021-03-02 RX ADMIN — DILTIAZEM HYDROCHLORIDE 120 MG: 120 CAPSULE, COATED, EXTENDED RELEASE ORAL at 08:46

## 2021-03-02 RX ADMIN — TAMSULOSIN HYDROCHLORIDE 0.4 MG: 0.4 CAPSULE ORAL at 08:47

## 2021-03-02 RX ADMIN — Medication 10 ML: at 13:43

## 2021-03-02 RX ADMIN — Medication 10 ML: at 06:42

## 2021-03-02 RX ADMIN — SODIUM CHLORIDE 75 ML/HR: 9 INJECTION, SOLUTION INTRAVENOUS at 00:48

## 2021-03-02 RX ADMIN — SODIUM CHLORIDE 100 ML/HR: 9 INJECTION, SOLUTION INTRAVENOUS at 13:43

## 2021-03-02 RX ADMIN — PHENYLEPHRINE HYDROCHLORIDE 25 MCG/MIN: 10 INJECTION INTRAVENOUS at 15:26

## 2021-03-02 RX ADMIN — FERROUS SULFATE TAB 325 MG (65 MG ELEMENTAL FE) 325 MG: 325 (65 FE) TAB at 08:47

## 2021-03-02 RX ADMIN — PANTOPRAZOLE SODIUM 40 MG: 40 TABLET, DELAYED RELEASE ORAL at 08:47

## 2021-03-02 RX ADMIN — ATORVASTATIN CALCIUM 20 MG: 20 TABLET, FILM COATED ORAL at 08:47

## 2021-03-02 RX ADMIN — GEMFIBROZIL 600 MG: 600 TABLET ORAL at 08:57

## 2021-03-02 RX ADMIN — Medication 10 ML: at 22:29

## 2021-03-02 RX ADMIN — DOCUSATE SODIUM - SENNOSIDES 1 TABLET: 50; 8.6 TABLET, FILM COATED ORAL at 08:46

## 2021-03-02 NOTE — PROGRESS NOTES
End of Shift Note    Bedside shift change report given to [facility], RN (oncoming nurse) by Johnny Galvin RN (offgoing nurse). Report included the following information SBAR, Kardex and Cardiac Rhythm nsr, sb    Shift worked:  7a to 7p       Shift summary and any significant changes:     Pt weaned off of don drip. VINOD drain removed from L neck. Concerns for physician to address: None     Zone phone for oncoming shift:   xxxx       Activity:  Activity Level: Up with Assistance  Number times ambulated in hallways past shift: 0  Number of times OOB to chair past shift: 0    Cardiac:   Cardiac Monitoring: Yes      Cardiac Rhythm: Sinus bradycardia    Access:   Current line(s): PIV     Genitourinary:   Urinary status: voiding    Respiratory:   O2 Device: Nasal cannula  Chronic home O2 use?: NO  Incentive spirometer at bedside: NO     GI:  Last Bowel Movement Date: 03/01/21  Current diet:  DIET REGULAR  Passing flatus: YES  Tolerating current diet: YES  % Diet Eaten: 100 %    Pain Management:   Patient states pain is manageable on current regimen: N/A    Skin:  Osorio Score: 20  Interventions: increase time out of bed    Patient Safety:  Fall Score:  Total Score: 3  Interventions: bed/chair alarm  High Fall Risk: Yes    Length of Stay:  Expected LOS: 1d 4h  Actual LOS: 1      Johnny Galvin RN

## 2021-03-02 NOTE — ROUTINE PROCESS
Dr. Patricia Medina in to see pt today. New orders to stop continuous normal saline infusion and to stop phenylephrine drip. Pt was initially at 40mcg/min. Pt has had soft systolic BP, so writer advised by pharmacy to slowly wean pt down. Pt now at 25mcg of don and BP 89/50, repeat 90/51. Writer reached out to Vascular NP Kym. Received new orders to restart IV fluids at 100ml/hr, keep don on for now and to encourage pt to drink.

## 2021-03-02 NOTE — PROGRESS NOTES
Vascular Surgery Progress Note  Liang Blake, AGACNP-BC          Admit Date: 3/1/2021   LOS: 1 day        Daily Progress Note: 3/2/2021    POD:1 Day Post-Op    S/P: Procedure(s):  LEFT CAROTID ARTERY ENDARTERECTOMY    Subjective:   Elise Arenas is a 72 yo male with a PMHx of CAD, HLD, HTN, GERD, BPH, A-fib and CKD who presented on 3/1/21 for a L Carotid Endartectomy for asymptomatic 80-99% l carotid artery stenosis with Dr. Roland Rucker. He tolerated the procedure well. Overnight he had no complications but did require his phenylephrine drip for hypotension. This morning he denies pain or headache. He is tolerating his breakfast without N/V. He is voiding without difficulty. His drain has minimal output. He is POD 1. Denies numbness, tingling, chest pain, leg pain, nausea, vomiting, difficulty swallowing, headache, and dyspnea. Objective:     Vital signs  Temp (24hrs), Av.1 °F (36.7 °C), Min:97.5 °F (36.4 °C), Max:98.9 °F (37.2 °C)    07 -  1900  In: 180 [P.O.:180]  Out: 780 [Urine:775; Drains:5]  1901 -  0700  In: 2726.8 [P.O.:540; I.V.:2186.8]  Out: 1055 [Urine:825; Drains:30]    Visit Vitals  BP (!) 96/59   Pulse 65   Temp 98.9 °F (37.2 °C)   Resp 19   Ht 5' 6\" (1.676 m)   Wt 85.7 kg (188 lb 15 oz)   SpO2 92%   BMI 30.49 kg/m²    O2 Flow Rate (L/min): 1 l/min O2 Device: Nasal cannula     Pain control  Pain Assessment  Pain Scale 1: Numeric (0 - 10)  Pain Intensity 1: 0    PT/OT  Gait                 Physical Exam:  General:  Alert, cooperative, no distress. Head:  Normocephalic, without obvious abnormality, atraumatic. Eyes:  Conjunctivae/corneas clear. Pupils equal, round, reactive to light. Extraocular movements intact. Lungs:    No respiratory effort noted, on 2LPM/NC    Chest wall:  No tenderness or deformity. Heart:  Regular rate and rhythm   Extremities: Extremities normal, atraumatic, no cyanosis or edema.    Skin: Skin color, texture, turgor normal. No rashes or lesions. Neurologic:   Wound:     CNII-XII intact. Normal strength, sensation, and reflexes throughout. Incision clean dry and intact, VINOD drain in place, minimal output            24 hour results:    Recent Results (from the past 24 hour(s))   GLUCOSE, POC    Collection Time: 03/01/21  4:38 PM   Result Value Ref Range    Glucose (POC) 96 65 - 100 mg/dL    Performed by Chang Sosa PCT    CBC W/O DIFF    Collection Time: 03/02/21  3:40 AM   Result Value Ref Range    WBC 6.1 4.1 - 11.1 K/uL    RBC 2.35 (L) 4.10 - 5.70 M/uL    HGB 7.5 (L) 12.1 - 17.0 g/dL    HCT 25.3 (L) 36.6 - 50.3 %    .7 (H) 80.0 - 99.0 FL    MCH 31.9 26.0 - 34.0 PG    MCHC 29.6 (L) 30.0 - 36.5 g/dL    RDW 19.1 (H) 11.5 - 14.5 %    PLATELET 658 (L) 856 - 400 K/uL    MPV 11.9 8.9 - 12.9 FL    NRBC 0.0 0  WBC    ABSOLUTE NRBC 0.00 0.00 - 7.33 K/uL   METABOLIC PANEL, BASIC    Collection Time: 03/02/21  3:40 AM   Result Value Ref Range    Sodium 139 136 - 145 mmol/L    Potassium 4.4 3.5 - 5.1 mmol/L    Chloride 111 (H) 97 - 108 mmol/L    CO2 22 21 - 32 mmol/L    Anion gap 6 5 - 15 mmol/L    Glucose 102 (H) 65 - 100 mg/dL    BUN 28 (H) 6 - 20 MG/DL    Creatinine 1.74 (H) 0.70 - 1.30 MG/DL    BUN/Creatinine ratio 16 12 - 20      GFR est AA 48 (L) >60 ml/min/1.73m2    GFR est non-AA 40 (L) >60 ml/min/1.73m2    Calcium 7.6 (L) 8.5 - 10.1 MG/DL          Assessment:   71yo male POD1 s/p L Carotid Endartectomy for asymptomatic carotid artery stenosis.      Active Problems:    Carotid stenosis (3/1/2021)        Plan:     Carotid Artery Stenosis    - s/p L CEA 3/1/21 with Dr. Quique Garcia   - d/c VINOD drain   - wound assessment per floor protocol   - ASA 81mg   - begin to optimize for discharge (wean off O2 and phenylephrine)   - PT/OT consult, OOB with assistance   - Tylenol 650mg q 4hrs PRN for mild pain   - Norco 5-325mg q 4hrs PRN for moderate pain   - Morphine 2mg IV q 3hrs PRN for severe pain   - senna-docusate 8.6-50mg 1 tab daily   - Incentive Spirometry per order    Hypotension   - begin to wean off phenyephrine, MAP goal > 65   - NS @ 100mL/hr   - encourage fluid intake   - monitor urine output   - Hold Lisinopril/HCTZ home med   - Hold Diltiazem home med,     HTN   - hold home meds until hypotension resolved    CKD   - Creatine elevated 1.74   - NS @ 100mL/hr   - recheck met.  Panel in AM    HLD:   - continue atorvastatin 20mg Daily - home med   - continue gemfibrozil 600mg Daily - home med    A-fib   - Restart Eliquis 5mg BID   - monitor HR for elevation since Diltiazem held    Anemia   - Ferrous sulfate 325mg daily   -CBC w/o diff in AM    GERD   - Protonix 40mg tab daily   - Pepcid 40mg tab daily    BPH   - Flomax 0.4mg daily    Current Smoker   - Nicotine 21mg/24hr patch transdermal q 24hrs          Activity: OOB in chair, ambulate with assistance, PT/OT  DVT ppx: Eliquis 5mg BID  Dispo: discharge to home POD2 pending optimization    Plan d/w Dr. Tereza Ramirez, NP

## 2021-03-02 NOTE — PROGRESS NOTES
1900: Bedside and Verbal shift change report given to GELACIO FIDE [facility] (oncoming nurse) by Megan Guerra (offgoing nurse). Report included the following information SBAR, Kardex, Intake/Output, MAR, Recent Results and Cardiac Rhythm Sinus Vena Alexis. 0700: End of Shift Note    Bedside shift change report given to Veronique Roman (oncoming nurse) by Elsie Mckeon RN (offgoing nurse). Report included the following information SBAR, Kardex, Intake/Output, MAR, Recent Results and Cardiac Rhythm Sinus Vena Alexis    Shift worked:  [id number]     Shift summary and any significant changes:     Titrated Stephen up. Concerns for physician to address:       Zone phone for oncoming shift:          Activity:  Activity Level: Up with Assistance  Number times ambulated in hallways past shift: 0  Number of times OOB to chair past shift: 0    Cardiac:   Cardiac Monitoring: Yes      Cardiac Rhythm: Sinus bradycardia    Access:   Current line(s): PIV     Genitourinary:   Urinary status: voiding    Respiratory:   O2 Device: Nasal cannula  Chronic home O2 use?: NO  Incentive spirometer at bedside: YES     GI:  Last Bowel Movement Date: 03/01/21  Current diet:  DIET REGULAR  Passing flatus: YES  Tolerating current diet: YES  % Diet Eaten: 50 %    Pain Management:   Patient states pain is manageable on current regimen: YES    Skin:  Osorio Score: 20  Interventions: increase time out of bed    Patient Safety:  Fall Score:  Total Score: 3  Interventions: bed/chair alarm, assistive device (walker, cane, etc), gripper socks and pt to call before getting OOB  High Fall Risk: Yes    Length of Stay:  Expected LOS: - - -  Actual LOS: 1      Elsie Mckeon RN

## 2021-03-03 VITALS
BODY MASS INDEX: 30.36 KG/M2 | TEMPERATURE: 97.3 F | RESPIRATION RATE: 18 BRPM | HEIGHT: 66 IN | HEART RATE: 79 BPM | SYSTOLIC BLOOD PRESSURE: 161 MMHG | WEIGHT: 188.93 LBS | DIASTOLIC BLOOD PRESSURE: 63 MMHG | OXYGEN SATURATION: 97 %

## 2021-03-03 LAB
ALBUMIN SERPL-MCNC: 3 G/DL (ref 3.5–5)
ANION GAP SERPL CALC-SCNC: 5 MMOL/L (ref 5–15)
BUN SERPL-MCNC: 18 MG/DL (ref 6–20)
BUN/CREAT SERPL: 15 (ref 12–20)
CALCIUM SERPL-MCNC: 7.6 MG/DL (ref 8.5–10.1)
CHLORIDE SERPL-SCNC: 112 MMOL/L (ref 97–108)
CO2 SERPL-SCNC: 22 MMOL/L (ref 21–32)
CREAT SERPL-MCNC: 1.19 MG/DL (ref 0.7–1.3)
ERYTHROCYTE [DISTWIDTH] IN BLOOD BY AUTOMATED COUNT: 18.8 % (ref 11.5–14.5)
GLUCOSE BLD STRIP.AUTO-MCNC: 117 MG/DL (ref 65–100)
GLUCOSE SERPL-MCNC: 96 MG/DL (ref 65–100)
HCT VFR BLD AUTO: 23.7 % (ref 36.6–50.3)
HGB BLD-MCNC: 7.3 G/DL (ref 12.1–17)
MCH RBC QN AUTO: 32.9 PG (ref 26–34)
MCHC RBC AUTO-ENTMCNC: 30.8 G/DL (ref 30–36.5)
MCV RBC AUTO: 106.8 FL (ref 80–99)
NRBC # BLD: 0 K/UL (ref 0–0.01)
NRBC BLD-RTO: 0 PER 100 WBC
PLATELET # BLD AUTO: 122 K/UL (ref 150–400)
PMV BLD AUTO: 11.8 FL (ref 8.9–12.9)
POTASSIUM SERPL-SCNC: 4.1 MMOL/L (ref 3.5–5.1)
RBC # BLD AUTO: 2.22 M/UL (ref 4.1–5.7)
SERVICE CMNT-IMP: ABNORMAL
SODIUM SERPL-SCNC: 139 MMOL/L (ref 136–145)
WBC # BLD AUTO: 4.9 K/UL (ref 4.1–11.1)

## 2021-03-03 PROCEDURE — 36415 COLL VENOUS BLD VENIPUNCTURE: CPT

## 2021-03-03 PROCEDURE — 82962 GLUCOSE BLOOD TEST: CPT

## 2021-03-03 PROCEDURE — 74011250637 HC RX REV CODE- 250/637: Performed by: SURGERY

## 2021-03-03 PROCEDURE — 80048 BASIC METABOLIC PNL TOTAL CA: CPT

## 2021-03-03 PROCEDURE — 82040 ASSAY OF SERUM ALBUMIN: CPT

## 2021-03-03 PROCEDURE — 74011250636 HC RX REV CODE- 250/636: Performed by: NURSE PRACTITIONER

## 2021-03-03 PROCEDURE — 85027 COMPLETE CBC AUTOMATED: CPT

## 2021-03-03 RX ORDER — IBUPROFEN 200 MG
1 TABLET ORAL EVERY 24 HOURS
Qty: 30 PATCH | Refills: 0 | Status: SHIPPED | OUTPATIENT
Start: 2021-03-03 | End: 2021-04-02

## 2021-03-03 RX ORDER — HYDROCODONE BITARTRATE AND ACETAMINOPHEN 5; 325 MG/1; MG/1
1 TABLET ORAL
Qty: 12 TAB | Refills: 0 | Status: SHIPPED | OUTPATIENT
Start: 2021-03-03 | End: 2021-03-06

## 2021-03-03 RX ORDER — PRENATAL VIT CALC,IRON,FOLIC
2 TABLET ORAL 2 TIMES DAILY
Qty: 360 TAB | Refills: 0 | Status: SHIPPED | OUTPATIENT
Start: 2021-03-03 | End: 2021-06-01

## 2021-03-03 RX ORDER — PRENATAL VIT CALC,IRON,FOLIC
2 TABLET ORAL 2 TIMES DAILY
Status: DISCONTINUED | OUTPATIENT
Start: 2021-03-03 | End: 2021-03-03 | Stop reason: HOSPADM

## 2021-03-03 RX ADMIN — ASPIRIN 81 MG: 81 TABLET, COATED ORAL at 08:23

## 2021-03-03 RX ADMIN — HYDROCHLOROTHIAZIDE: 12.5 CAPSULE ORAL at 09:41

## 2021-03-03 RX ADMIN — FERROUS SULFATE TAB 325 MG (65 MG ELEMENTAL FE) 325 MG: 325 (65 FE) TAB at 08:25

## 2021-03-03 RX ADMIN — Medication 10 ML: at 06:50

## 2021-03-03 RX ADMIN — ATORVASTATIN CALCIUM 20 MG: 20 TABLET, FILM COATED ORAL at 08:23

## 2021-03-03 RX ADMIN — DOCUSATE SODIUM - SENNOSIDES 1 TABLET: 50; 8.6 TABLET, FILM COATED ORAL at 08:24

## 2021-03-03 RX ADMIN — PANTOPRAZOLE SODIUM 40 MG: 40 TABLET, DELAYED RELEASE ORAL at 08:24

## 2021-03-03 RX ADMIN — SODIUM CHLORIDE 100 ML/HR: 9 INJECTION, SOLUTION INTRAVENOUS at 03:34

## 2021-03-03 RX ADMIN — FAMOTIDINE 40 MG: 20 TABLET, FILM COATED ORAL at 08:23

## 2021-03-03 RX ADMIN — GEMFIBROZIL 600 MG: 600 TABLET ORAL at 08:24

## 2021-03-03 RX ADMIN — DILTIAZEM HYDROCHLORIDE 120 MG: 120 CAPSULE, COATED, EXTENDED RELEASE ORAL at 08:23

## 2021-03-03 RX ADMIN — APIXABAN 5 MG: 5 TABLET, FILM COATED ORAL at 08:24

## 2021-03-03 RX ADMIN — TAMSULOSIN HYDROCHLORIDE 0.4 MG: 0.4 CAPSULE ORAL at 08:24

## 2021-03-03 NOTE — PROGRESS NOTES
Transition of Care Plan:     Disposition: Home with family  Follow up appointments: PCP, Vascular Surgery  DME needed: Pt has rolling walker, cane, wheelchair  Transportation at Discharge: Family   [place] or means to access home:  Dtr in law is transporting him and dtr in law will access to the house      IM Medicare letter: N/A due to pt having [address] Contact:Naty Mcgheedrt-in-law [phone]  Discharge Caregiver contacted prior to discharge? CM will contacted prior to d/c    Pt is ready for d/c from a  stand point. RN informed. Pt had no needs at the time of d/c. Care Management Interventions  PCP Verified by CM: Yes(Dr. Kiarra Richards)  Mode of Transport at Discharge: Other (see comment)(Family to transport)  Hospital Transport Time of Discharge: 1100  Transition of Care Consult (CM Consult): Discharge Planning(Home with family)  Discharge Durable Medical Equipment: No(Has a rolling walker, wheelchair, and cane)  Physical Therapy Consult: No  Occupational Therapy Consult: No  Speech Therapy Consult: No  Current Support Network: Relative's Home(Family is very supportive and involved.)  Confirm Follow Up Transport: Self(Pt drives himself to his medical appointments)  Discharge Location  Discharge Placement: Home with family assistance      Urszula Sanchez.   [facility] [id number] [facility]  [phone]

## 2021-03-03 NOTE — DISCHARGE INSTRUCTIONS
Patient Education        Carotid Endarterectomy: What to Expect at Home  Your Recovery  A carotid endarterectomy (say \"omar-RAW-fazal perezev-hdz-wkp-WANDY-yen-srinivasan\") is surgery to remove fatty build-up (plaque) from one of the carotid arteries. Your doctor made a cut (incision) in your neck and carotid artery to take out the plaque. You may have a sore throat for a few days. You can expect the incision to be sore for about a week. The area around it may also be swollen and bruised at first. The area in front of the incision may be numb. This usually gets better after 6 to 12 months. Your doctor closed the incision in your neck with stitches. The stitches will be removed 7 to 14 days after surgery, or you may have stitches that dissolve on their own. You may feel more tired than usual for several weeks after surgery. You will probably be able to go back to work or your usual activities after you have your postop appointment with your doctor. This care sheet gives you a general idea about how long it will take for you to recover. But each person recovers at a different pace. Follow the steps below to feel better as quickly as possible. How can you care for yourself at home? Activity    · Rest when you feel tired. Getting enough sleep will help you recover.     · Try to walk each day. Start by walking a little more than you did the day before. Bit by bit, increase the amount you walk. Walking boosts blood flow and helps prevent pneumonia and constipation.     · Avoid strenuous activities, such as bicycle riding, jogging, weight lifting, or aerobic exercise. Your doctor will tell you when it's okay to do strenuous activity.     · For 1 to 2 weeks, avoid lifting anything that would make you strain.  This may include a child, heavy grocery bags and milk containers, a heavy briefcase or backpack, cat litter or dog food bags, or a vacuum .     · Ask your doctor when you can drive again.     · You will probably need to take 1 to 2 weeks off from work. It depends on the type of work you do and how you feel.     · You may shower and take baths as usual. But do not soak the incision for the first 2 weeks, or until your doctor tells you it is okay. Pat the incision dry.     · Your doctor will tell you when you can have sex again. Diet    · You can eat your normal diet. If your stomach is upset, try bland, low-fat foods like plain rice, broiled chicken, toast, and yogurt.     · Drink plenty of fluids (unless your doctor tells you not to).     · You may notice that your bowel movements are not regular right after your surgery. This is common. Try to avoid constipation and straining with bowel movements. You may want to take a fiber supplement every day. If you have not had a bowel movement after a couple of days, ask your doctor about taking a mild laxative. Medicines    · Your doctor will tell you if and when you can restart your medicines. He or she will also give you instructions about taking any new medicines.     · Your doctor may advise you to take aspirin when you go home. This helps prevent blood clots. Take your medicines exactly as prescribed. Call your doctor if you think you are having a problem with your medicine.     · Be safe with medicines. Take pain medicines exactly as directed. ? If the doctor gave you a prescription medicine for pain, take it as prescribed. ? If you are not taking a prescription pain medicine, ask your doctor if you can take an over-the-counter medicine. ? Do not take two or more pain medicines at the same time unless the doctor told you to. Many pain medicines have acetaminophen, which is Tylenol. Too much acetaminophen (Tylenol) can be harmful.     · If you think your pain medicine is making you sick to your stomach:  ? Take your medicine after meals (unless your doctor has told you not to). ?  Ask your doctor for a different pain medicine.     · If your doctor prescribed antibiotics, take them as directed. Do not stop taking them just because you feel better. You need to take the full course of antibiotics.     · If you take a blood thinner, such as aspirin, be sure you get instructions about how to take your medicine safely. Blood thinners can cause serious bleeding problems. Incision care    · If you have strips of tape over your incision, leave the tape on for a week or until it falls off.     · Wash the area daily with water and pat it dry. Other cleaning products, such as hydrogen peroxide, can make the wound heal more slowly. You may cover the area with a gauze bandage if it weeps or rubs against clothing. Change the bandage every day.     · Keep the area clean and dry. Follow-up care is a key part of your treatment and safety. Be sure to make and go to all appointments, and call your doctor if you are having problems. It's also a good idea to know your test results and keep a list of the medicines you take. When should you call for help? Call 911 anytime you think you may need emergency care. For example, call if:    · You passed out (lost consciousness).     · You have severe trouble breathing.     · You have a tight bulge in your neck on the side where the surgery was done.     · You have symptoms of a stroke. These may include:  ? Sudden numbness, tingling, weakness, or loss of movement in your face, arm, or leg, especially on only one side of your body. ? Sudden vision changes. ? Sudden trouble speaking. ? Sudden confusion or trouble understanding simple statements. ? Sudden problems with walking or balance. ? A sudden, severe headache that is different from past headaches.     · You have chest pain or pressure. This may occur with:  ? Sweating. ? Shortness of breath. ? Nausea or vomiting. ? Pain that spreads from the chest to the neck, jaw, or one or both shoulders or arms. ? Dizziness or lightheadedness. ? A fast or uneven pulse.   After calling 911, chew 1 adult-strength or 2 to 4 low-dose aspirin. Wait for an ambulance. Do not try to drive yourself. Call your doctor now or seek immediate medical care if:    · You have pain that does not get better after you take pain medicine.     · You have loose stitches, or your incision comes open.     · Bright red blood has soaked through the bandage over your incision.     · You have signs of infection, such as:  ? Increased pain, swelling, warmth, or redness. ? Red streaks leading from the incision. ? Pus draining from the incision. ? A fever. Watch closely for any changes in your health, and be sure to contact your doctor if you have any problems. Where can you learn more? Go to http://www.gray.com/  Enter D902 in the search box to learn more about \"Carotid Endarterectomy: What to Expect at Home. \"  Current as of: December 16, 2019               Content Version: 12.6  © 3134-6196 [employer]. Care instructions adapted under license by [employer] (which disclaims liability or warranty for this information). If you have questions about a medical condition or this instruction, always ask your healthcare professional. [place] [address] any warranty or liability for your use of this information.

## 2021-03-03 NOTE — PROGRESS NOTES
Problem: Falls - Risk of  Goal: *Absence of Falls  Description: Document Conner Dick Fall Risk and appropriate interventions in the flowsheet. Outcome: Progressing Towards Goal  Note: Fall Risk Interventions:  Mobility Interventions: Communicate number of staff needed for ambulation/transfer, Patient to call before getting OOB         Medication Interventions: Patient to call before getting OOB, Teach patient to arise slowly    Elimination Interventions: Call light in reach, Patient to call for help with toileting needs, Toileting schedule/hourly rounds, Urinal in reach              Problem: Patient Education: Go to Patient Education Activity  Goal: Patient/Family Education  Outcome: Progressing Towards Goal     Problem: Hypertension  Goal: *Blood pressure within specified parameters  Outcome: Progressing Towards Goal  Goal: *Fluid volume balance  Outcome: Progressing Towards Goal  Goal: *Labs within defined limits  Outcome: Progressing Towards Goal     Problem: Patient Education: Go to Patient Education Activity  Goal: Patient/Family Education  Outcome: Progressing Towards Goal     Problem: Diabetes Self-Management  Goal: *Disease process and treatment process  Description: Define diabetes and identify own type of diabetes; list 3 options for treating diabetes. Outcome: Progressing Towards Goal  Goal: *Incorporating nutritional management into lifestyle  Description: Describe effect of type, amount and timing of food on blood glucose; list 3 methods for planning meals. Outcome: Progressing Towards Goal  Goal: *Incorporating physical activity into lifestyle  Description: State effect of exercise on blood glucose levels. Outcome: Progressing Towards Goal  Goal: *Developing strategies to promote health/change behavior  Description: Define the ABC's of diabetes; identify appropriate screenings, schedule and personal plan for screenings.   Outcome: Progressing Towards Goal  Goal: *Using medications safely  Description: State effect of diabetes medications on diabetes; name diabetes medication taking, action and side effects. Outcome: Progressing Towards Goal  Goal: *Monitoring blood glucose, interpreting and using results  Description: Identify recommended blood glucose targets  and personal targets. Outcome: Progressing Towards Goal  Goal: *Prevention, detection, treatment of acute complications  Description: List symptoms of hyper- and hypoglycemia; describe how to treat low blood sugar and actions for lowering  high blood glucose level. Outcome: Progressing Towards Goal  Goal: *Prevention, detection and treatment of chronic complications  Description: Define the natural course of diabetes and describe the relationship of blood glucose levels to long term complications of diabetes.   Outcome: Progressing Towards Goal  Goal: *Developing strategies to address psychosocial issues  Description: Describe feelings about living with diabetes; identify support needed and support network  Outcome: Progressing Towards Goal  Goal: *Insulin pump training  Outcome: Progressing Towards Goal  Goal: *Sick day guidelines  Outcome: Progressing Towards Goal  Goal: *Patient Specific Goal (EDIT GOAL, INSERT TEXT)  Outcome: Progressing Towards Goal     Problem: Patient Education: Go to Patient Education Activity  Goal: Patient/Family Education  Outcome: Progressing Towards Goal     Problem: Infection - Risk of, Surgical Site Infection  Goal: *Absence of surgical site infection signs and symptoms  Outcome: Progressing Towards Goal     Problem: Patient Education: Go to Patient Education Activity  Goal: Patient/Family Education  Outcome: Progressing Towards Goal

## 2021-03-03 NOTE — DISCHARGE SUMMARY
Vascular Surgery Discharge Summary     Patient ID:  Shira Lyman  [id number]  69 y.o.  1955    Admitting Provider: Brenda Scott MD  Discharging Provider:    [address] [place] [address] Date: 3/1/2021    Discharge Date: 3/3/2021    Discharge Diagnoses: Active Problems:    Carotid stenosis (3/1/2021)    Atrial Fibrillation POA  HTN POA  CAD POA  COPD POA  Diabetes POA  CKD POA  Tobacco Use POA      Procedure(s):  LEFT CAROTID ARTERY ENDARTERECTOMY      Hospital Course:   72 yo male with a PMHx of CAD, HLD, HTN, GERD, BPH, A-fib and CKD who presented on 3/1/21 for a L Carotid Endartectomy for asymptomatic 80-99% carotid artery stenosis with Dr. Roland Rucker. He tolerated the procedure well. His postoperative stay was mostly uncomplicated. He required phenylephrine gtt initially but was weaned off by mid-day POD1. His drain was removed and the incision site looked good with no signs of hematoma. He had no complaints of n/v,  headache, imbalance or pain not controlled by medication. By POD2 he was optimized and ready to be discharged home.     Pertinent Results:   Recent Results (from the past 24 hour(s))   GLUCOSE, POC    Collection Time: 03/02/21  5:17 PM   Result Value Ref Range    Glucose (POC) 153 (H) 65 - 100 mg/dL    Performed by Anabel eBll, POC    Collection Time: 03/02/21  8:35 PM   Result Value Ref Range    Glucose (POC) 144 (H) 65 - 100 mg/dL    Performed by [facility] PCT    METABOLIC PANEL, BASIC    Collection Time: 03/03/21  4:29 AM   Result Value Ref Range    Sodium 139 136 - 145 mmol/L    Potassium 4.1 3.5 - 5.1 mmol/L    Chloride 112 (H) 97 - 108 mmol/L    CO2 22 21 - 32 mmol/L    Anion gap 5 5 - 15 mmol/L    Glucose 96 65 - 100 mg/dL    BUN 18 6 - 20 MG/DL    Creatinine 1.19 0.70 - 1.30 MG/DL    BUN/Creatinine ratio 15 12 - 20      GFR est AA >60 >60 ml/min/1.73m2    GFR est non-AA >60 >60 ml/min/1.73m2    Calcium 7.6 (L) 8.5 - 10.1 MG/DL   CBC W/O DIFF    Collection Time: 03/03/21  4:29 AM   Result Value Ref Range    WBC 4.9 4.1 - 11.1 K/uL    RBC 2.22 (L) 4.10 - 5.70 M/uL    HGB 7.3 (L) 12.1 - 17.0 g/dL    HCT 23.7 (L) 36.6 - 50.3 %    .8 (H) 80.0 - 99.0 FL    MCH 32.9 26.0 - 34.0 PG    MCHC 30.8 30.0 - 36.5 g/dL    RDW 18.8 (H) 11.5 - 14.5 %    PLATELET 128 (L) 901 - 400 K/uL    MPV 11.8 8.9 - 12.9 FL    NRBC 0.0 0  WBC    ABSOLUTE NRBC 0.00 0.00 - 0.01 K/uL   GLUCOSE, POC    Collection Time: 03/03/21  7:58 AM   Result Value Ref Range    Glucose (POC) 117 (H) 65 - 100 mg/dL    Performed by Kalyn Self        Vital signs:   Patient Vitals for the past 24 hrs:   BP Temp Pulse Resp SpO2   03/03/21 0752 (!) 161/63 97.3 °F (36.3 °C) 79 18 97 %   03/03/21 0400 137/60 98 °F (36.7 °C) 64 19 94 %   03/02/21 2234 (!) 146/60 98 °F (36.7 °C) 67 18 96 %   03/02/21 1915 (!) 139/58 98.1 °F (36.7 °C) 68 19 93 %   03/02/21 1801 (!) 134/48  65 18 95 %   03/02/21 1746 (!) 123/48  71 18 97 %   03/02/21 1731 (!) 131/51  64 15 94 %   03/02/21 1716   79 16 98 %   03/02/21 1701 (!) 143/55  83 21 96 %   03/02/21 1646 (!) 127/58  71 18 100 %   03/02/21 1631 (!) 133/46  73 21 100 %   03/02/21 1630   73 14 96 %   03/02/21 1616   66 19 97 %   03/02/21 1615 (!) 127/47  66 18 96 %   03/02/21 1601 (!) 133/49  62 16 96 %   03/02/21 1600 (!) 133/49  60 17 96 %   03/02/21 1545 113/60  63 15 97 %   03/02/21 1534 (!) 108/45    93 %   03/02/21 1530 (!) 108/45    93 %   03/02/21 1515 (!) 105/54 97.9 °F (36.6 °C) 82 18 96 %   03/02/21 1500 (!) 96/45    95 %   03/02/21 1445 (!) 96/58  71 17 95 %   03/02/21 1430 (!) 111/51  61 16 97 %   03/02/21 1415 (!) 91/50  63 18 94 %   03/02/21 1400 (!) 94/49  65 19 95 %   03/02/21 1345 97/66  69 19 93 %   03/02/21 1330 (!) 99/52  65 18 93 %   03/02/21 1315 (!) 90/51  65 19 90 %   03/02/21 1300 (!) 89/50  65 20 91 %   03/02/21 1245 (!) 96/59  65 19 92 %   03/02/21 1230 (!) 100/54  63 18 95 %   03/02/21 1215 111/60  63 17 95 %   03/02/21 1200 106/60  63 18 95 %   03/02/21 1145 (!) 104/52  62 16 95 %   03/02/21 1130 (!) 112/54  63 15 95 %   03/02/21 1115 (!) 99/52  63 16 95 %   03/02/21 1100 (!) 106/58  63 18 97 %   03/02/21 1045 (!) 101/58 98.9 °F (37.2 °C) 61 18 97 %   03/02/21 1030 (!) 101/55  (!) 56 17 97 %   03/02/21 1015 (!) 110/50  (!) 56 16 97 %   03/02/21 1000 (!) 92/59  (!) 59 18 96 %   03/02/21 0945 (!) 106/56  (!) 56 20 97 %   03/02/21 0930 (!) 103/59  (!) 56 19 97 %       Patient Weight:   Last 3 Recorded Weights in this Encounter    03/01/21 0626   Weight: 85.7 kg (188 lb 15 oz)       Consults: None    Patient Condition at Discharge: good    Disposition: home    Patient Instructions:   Current Discharge Medication List      START taking these medications    Details   HYDROcodone-acetaminophen (NORCO) 5-325 mg per tablet Take 1 Tab by mouth every four (4) hours as needed for Pain for up to 3 days. Max Daily Amount: 6 Tabs. Qty: 12 Tab, Refills: 0    Associated Diagnoses: Stenosis of left carotid artery      nicotine (NICODERM CQ) 21 mg/24 hr 1 Patch by TransDERmal route every twenty-four (24) hours for 30 days. Qty: 30 Patch, Refills: 0         CONTINUE these medications which have NOT CHANGED    Details   tamsulosin (FLOMAX) 0.4 mg capsule TAKE 1 CAPSULE EVERY DAY 30 MINUTES AFTER THE SAME MEAL EACH DAY      ferrous sulfate (Iron) 325 mg (65 mg iron) tablet Take 45 mg by mouth Daily (before breakfast). famotidine (PEPCID) 40 mg tablet Take 40 mg by mouth daily. 1/2 tablet      senna-docusate (PERICOLACE) 8.6-50 mg per tablet Take 1 Tab by mouth daily. Qty: 30 Tab, Refills: 0      atorvastatin (LIPITOR) 20 mg tablet Take 20 mg by mouth daily. apixaban (Eliquis) 5 mg tablet Take 1 Tab by mouth two (2) times a day. Qty: 180 Tab, Refills: 3      dilTIAZem ER (CARDIZEM CD) 180 mg capsule Take 1 Cap by mouth daily. Qty: 90 Cap, Refills: 1      pantoprazole (PROTONIX) 40 mg tablet Take 40 mg by mouth daily.       docosahexaenoic acid/epa (FISH OIL PO) Take 1 Tab by mouth daily. aspirin delayed-release 81 mg tablet Take 81 mg by mouth daily. multivitamin (ONE A DAY) tablet Take 1 Tab by mouth daily. lisinopril-hydroCHLOROthiazide (PRINZIDE, ZESTORETIC) 20-12.5 mg per tablet Take 1 Tab by mouth two (2) times a day. gemfibrozil (LOPID) 600 mg tablet Take 600 mg by mouth two (2) times a day. metFORMIN (GLUCOPHAGE) 1,000 mg tablet Take 1,000 mg by mouth two (2) times a day. Diet: Regular Diet    Activity/Wound Care:     Patient may shower. Dry the wound carefully. The dressing can be removed if there is no drainage, or changed and kept in place for comfort. Patient should not attempt to drive a car until they are comfortable and NOT taking pain medication. No heavy lifting (3 lbs limit). Mild exercise and light duties around the house are OK. Call the office at [phone] if there are any problems.     Follow-up Information     Follow up With Specialties Details Why Contact Info    Vasile Tolentino MD Family Medicine On 3/12/2021 For hospital follow up appointment at 11:15AM [address] [place] [address] [address] [phone]      Conenr Chua MD General and Vascular Surgery Go on 3/16/2021 For wound re-check [address] [place] [address]  [phone]            Signed:  Bebe Marshall NP  3/3/2021  9:17 AM

## 2021-03-03 NOTE — PROGRESS NOTES
1900 Bedside and Verbal shift change report given to ZION Bautista and Sim Wolff RN (oncoming nurse) by Antonio Almanzar RN (offgoing nurse). Report included the following information SBAR, Kardex, MAR, Recent Results and Cardiac Rhythm NSR/SB. Pt. resting comfortably in bed, no complaints of nausea or pain. End of Shift Note    Bedside shift change report given to Corinne Sack, RN (oncoming nurse) by Galo Thompson RN (offgoing nurse). Report included the following information SBAR, Kardex, Intake/Output, MAR, Recent Results and Cardiac Rhythm NSR/sinus bradycardia    Shift worked:  1900 - 0700     Shift summary and any significant changes:     Pt. tolerated shift well with no complaints of pain or nausea. Meds given as scheduled. AM labs drawn. Concerns for physician to address:       Zone phone for oncoming shift:   [phone]       Activity:  Activity Level: Up with Assistance  Number times ambulated in hallways past shift: 0  Number of times OOB to chair past shift: 0    Cardiac:   Cardiac Monitoring: Yes      Cardiac Rhythm: Normal sinus rhythm    Access:   Current line(s): PIV     Genitourinary:   Urinary status: voiding    Respiratory:   O2 Device: Room air  Chronic home O2 use?: NO  Incentive spirometer at bedside: NO     GI:  Last Bowel Movement Date: 03/01/21  Current diet:  DIET REGULAR  Passing flatus: YES  Tolerating current diet: YES  % Diet Eaten: 100 %    Pain Management:   Patient states pain is manageable on current regimen: YES    Skin:  Osorio Score: 20  Interventions: float heels and nutritional support     Patient Safety:  Fall Score:  Total Score: 3  Interventions: gripper socks and pt to call before getting OOB  High Fall Risk: Yes    Length of Stay:  Expected LOS: 1d 4h  Actual LOS: 2      Galo Thompson RN

## 2021-03-03 NOTE — OP NOTES
[address]  OPERATIVE REPORT    Name:  Lew Kulkarni  MR#:  [id number]  :  1955  ACCOUNT #:  [de-identified]  DATE OF SERVICE:  2021    PREOPERATIVE DIAGNOSIS:  High-grade left carotid stenosis. POSTOPERATIVE DIAGNOSIS:  High-grade left carotid stenosis. PROCEDURE PERFORMED:  Left carotid endarterectomy with bovine patch. SURGEON:  Felice Lyles MD    ANESTHESIA:  General.    COMPLICATIONS:  None. SPECIMENS REMOVED:  Carotid plaque. ESTIMATED BLOOD LOSS:  None. INDICATIONS:  The patient is a 43-year-old with a progressive high-grade asymptomatic left carotid stenosis for elective endarterectomy. PROCEDURE:  As follows, after obtaining informed consent, the patient was placed supine on the operating table. After adequate induction of general anesthesia, site and patient confirmation, administration of prophylactic antibiotics, the left neck was prepped and draped in sterile fashion. An incision was made along the anterior border of the sternocleidomastoid and deepened into the platysma. Crossing facial veins were doubly ligated and divided. The common carotid was identified at the level of omohyoid and dissection continued on the anterior aspect of the carotid in a cephalad direction until the external carotid and its branches and the internal carotid were all identified, dissected free and controlled. The patient was systemically heparinized. The internal and external common carotids were all occluded. An arteriotomy was begun on the common carotid and extended to the area of the heavy bifurcation of disease and on to a surprisingly small internal carotid which was somewhat redundant. A Lamont shunt was placed, held in position with Lamont shunt clamps, and flow verified with a handheld Doppler.     Endarterectomy was begun on the common carotid and extended cephalad to include eversion endarterectomy of the external carotid, and finally a directly visualized endarterectomy of the internal carotid well above the bifurcation. The endarterectomy was not a clean tapered one and the intimal endpoint was tacked down with 4 interrupted 7-0 Prolene sutures with knots on the outside of the vessel. This resulted in a satisfactory endpoint. The endarterectomized segment was meticulously inspected for loose debris, irrigated and evacuated. The arteriotomy was closed with a bovine patch using 2 continuous Prolene sutures. Just prior to completion of the patch angioplasty, the shunt was removed. Flow was initially restored up the external carotid and ultimately up the internal carotid. The wound was irrigated, evacuated and deemed hemostatic, it was closed. Instruments, sponge and needle count revealed that a single 7-0 Prolene needle was unaccounted for, this could not be identified in the wound or on the operative field or drapes. Intraoperative fluoroscopy was used to evaluate the operative field and no evidence of the needle was seen within the operative field. The wound was closed in 2 layers over a closed suction drain. Skin was closed with running subcuticular stitch. The patient tolerated the procedure well with no complications, was extubated in the operating room and returned to the recovery room in stable fashion. He was neurologically intact including all cranial nerves.       Jean Claude Saini MD      [id number]  D:  03/03/2021 10:58  T:  03/03/2021 12:30  JOB #:  [id number]  CC:  MD Emmanuel Mejia MD

## 2021-04-01 ENCOUNTER — TELEPHONE (OUTPATIENT)
Dept: CARDIOLOGY CLINIC | Age: 66
End: 2021-04-01

## 2021-04-01 NOTE — TELEPHONE ENCOUNTER
----- Message from Josefa Parekh MD sent at 3/31/2021 10:07 PM EDT -----  Please advise no recurrent atrial fibrillation. Continue Eliquis and diltiazem. He does not need amiodarone at this time. Follow-up as previously planned.

## 2021-04-01 NOTE — TELEPHONE ENCOUNTER
Verified patient with two identifiers. Pt informed of monitor results. Informed to continue the diltiazem and eliquis. Informed he does not need to take the amiodarone at this time. He has a follow up in August. Pt verbalized understanding.

## 2021-04-01 NOTE — PROGRESS NOTES
Please advise no recurrent atrial fibrillation. Continue Eliquis and diltiazem. He does not need amiodarone at this time. Follow-up as previously planned.

## 2021-04-02 ENCOUNTER — HOSPITAL ENCOUNTER (OUTPATIENT)
Dept: CT IMAGING | Age: 66
Discharge: HOME | End: 2021-04-02
Attending: INTERNAL MEDICINE

## 2021-08-03 PROBLEM — I48.91 ATRIAL FIBRILLATION (HCC): Status: RESOLVED | Noted: 2019-02-19 | Resolved: 2021-08-03

## 2021-08-25 ENCOUNTER — OFFICE VISIT (OUTPATIENT)
Dept: CARDIOLOGY CLINIC | Age: 66
End: 2021-08-25
Payer: COMMERCIAL

## 2021-08-25 VITALS
HEIGHT: 66 IN | BODY MASS INDEX: 32.5 KG/M2 | RESPIRATION RATE: 18 BRPM | OXYGEN SATURATION: 94 % | HEART RATE: 74 BPM | WEIGHT: 202.2 LBS | SYSTOLIC BLOOD PRESSURE: 140 MMHG | DIASTOLIC BLOOD PRESSURE: 90 MMHG

## 2021-08-25 DIAGNOSIS — I48.0 PAROXYSMAL ATRIAL FIBRILLATION (HCC): Primary | ICD-10-CM

## 2021-08-25 DIAGNOSIS — E78.2 MIXED HYPERLIPIDEMIA: ICD-10-CM

## 2021-08-25 DIAGNOSIS — I10 ESSENTIAL HYPERTENSION: ICD-10-CM

## 2021-08-25 DIAGNOSIS — F17.200 CURRENT SMOKER: ICD-10-CM

## 2021-08-25 DIAGNOSIS — I65.22 STENOSIS OF LEFT CAROTID ARTERY: ICD-10-CM

## 2021-08-25 PROCEDURE — 93000 ELECTROCARDIOGRAM COMPLETE: CPT | Performed by: INTERNAL MEDICINE

## 2021-08-25 PROCEDURE — 99214 OFFICE O/P EST MOD 30 MIN: CPT | Performed by: INTERNAL MEDICINE

## 2021-08-25 RX ORDER — HYDROCODONE BITARTRATE AND ACETAMINOPHEN 5; 325 MG/1; MG/1
TABLET ORAL
COMMUNITY

## 2021-08-25 RX ORDER — LEVOTHYROXINE SODIUM 50 UG/1
TABLET ORAL
COMMUNITY

## 2021-08-25 NOTE — PROGRESS NOTES
1. Have you been to the ER, urgent care clinic since your last visit? Hospitalized since your last visit? No    2. Have you seen or consulted any other health care providers outside of the [address] [place] [address] since your last visit? Include any pap smears or colon screening. No     Chief Complaint   Patient presents with    Follow-up     6 mo f/up.

## 2021-08-25 NOTE — LETTER
8/25/2021    Patient: Mavis Jauregui   YOB: 1955   Date of Visit: 8/25/2021     Isra Kulkarni MD  [address] [facility]  [place] [phone]  Via Fax: [phone]    Dear Isra Kulkarni MD,      Thank you for referring Mr. Roly Rose to [address] [place] [address] Ave for evaluation. My notes for this consultation are attached. If you have questions, please do not hesitate to call me. I look forward to following your patient along with you.       Sincerely,    Adam Salazar MD

## 2021-08-25 NOTE — PROGRESS NOTES
Fer Marshall, [facility]    Subjective/HPI:     Bary Lennox is a 73 y.o. male is here for routine f/u. He has a PMHx of PAF, carotid artery disease s/p left CEA, HTN, HLD, COPD, and prior tobacco abuse. Doing well. Had left CEA done by Dr. Kenna Luther in 3/2021. Quit smoking after that, hasn't picked it back up. Denies complaints of chest pains, dizziness, orthopnea, PND or edema. Denies palpitation symptoms, shortness of breath on exertion. Past Medical History:   Diagnosis Date    Arthritis     Asthma     no inhaler    Atrial fibrillation (HCC)     Chronic kidney disease     CKD III-Nephrologist is Dr. Terra Vergara Chronic obstructive pulmonary disease ([facility]     Chronic pain     Diabetes ([facility]     Type II    Hypertension     Infectious disease     Long term current use of anticoagulant therapy     Mixed hyperlipidemia 8/25/2021       Past Surgical History:   Procedure Laterality Date    HX HIP REPLACEMENT Right 02/11/2019    Acetabular bone graft    HX ORTHOPAEDIC Right 2019    Hip replacement    HX ORTHOPAEDIC Right 2019    femur fx - hardware    HX TONSILLECTOMY         Family History   Problem Relation Age of Onset    Heart Disease Mother     Heart Disease Father     Alzheimer Father     Heart Disease Brother        Social History     Socioeconomic History    Marital status: SINGLE     Spouse name: Not on file    Number of children: Not on file    Years of education: Not on file    Highest education level: Not on file   Occupational History    Not on file   Tobacco Use    Smoking status: Current Every Day Smoker     Packs/day: 1.00     Years: 30.00     Pack years: 30.00     Types: Cigarettes    Smokeless tobacco: Never Used    Tobacco comment: 2 ppd for years/cutting back   Vaping Use    Vaping Use: Never used   Substance and Sexual Activity    Alcohol use:  Yes     Alcohol/week: 4.0 standard drinks     Types: 4 Cans of beer per week     Comment: 1 drink daily    Drug use: No    Sexual activity: Not on file   Other Topics Concern     Service Not Asked    Blood Transfusions Not Asked    Caffeine Concern Not Asked    Occupational Exposure Not Asked    Hobby Hazards Not Asked    Sleep Concern Not Asked    Stress Concern Not Asked    Weight Concern Not Asked    Special Diet Not Asked    Back Care Not Asked    Exercise Not Asked    Bike Helmet Not Asked   [address] Not Asked    Self-Exams Not Asked   Social History Narrative    Not on file     Social Determinants of Health     Financial Resource Strain:     Difficulty of Paying Living Expenses:    Food Insecurity:     Worried About Running Out of Food in the Last Year:     [address] in the Last Year:    Transportation Needs:     Lack of Transportation (Medical):      Lack of Transportation (Non-Medical):    Physical Activity:     Days of Exercise per Week:     Minutes of Exercise per Session:    Stress:     Feeling of Stress :    Social Connections:     Frequency of Communication with Friends and Family:     Frequency of Social Gatherings with Friends and Family:     Attends Yazidism Services:     Active Member of Clubs or Organizations:     Attends Club or Organization Meetings:     Marital Status:    Intimate Partner Violence:     Fear of Current or Ex-Partner:     Emotionally Abused:     Physically Abused:     Sexually Abused:        No Known Allergies    Current Outpatient Medications on File Prior to Visit   Medication Sig Dispense Refill    levothyroxine (SYNTHROID) 50 mcg tablet levothyroxine 50 mcg tablet   1 tablet daily      HYDROcodone-acetaminophen (NORCO) 5-325 mg per tablet hydrocodone 5 mg-acetaminophen 325 mg tablet      CALCIUM CITRATE-VITAMIN D3 PO Citracal + D Maximum   1 tablet BID      dilTIAZem ER (CARDIZEM CD) 180 mg capsule TAKE 1 CAPSULE BY MOUTH DAILY 90 Cap 2    tamsulosin (FLOMAX) 0.4 mg capsule TAKE 1 CAPSULE EVERY DAY 30 MINUTES AFTER THE SAME MEAL EACH DAY      ferrous sulfate (Iron) 325 mg (65 mg iron) tablet Take 45 mg by mouth Daily (before breakfast).  apixaban (Eliquis) 5 mg tablet Take 1 Tab by mouth two (2) times a day. 180 Tab 3    famotidine (PEPCID) 40 mg tablet Take 40 mg by mouth daily. 1/2 tablet      pantoprazole (PROTONIX) 40 mg tablet Take 40 mg by mouth daily.  docosahexaenoic acid/epa (FISH OIL PO) Take 1 Tab by mouth daily.  aspirin delayed-release 81 mg tablet Take 81 mg by mouth daily.  multivitamin (ONE A DAY) tablet Take 1 Tab by mouth daily.  senna-docusate (PERICOLACE) 8.6-50 mg per tablet Take 1 Tab by mouth daily. 30 Tab 0    lisinopril-hydroCHLOROthiazide (PRINZIDE, ZESTORETIC) 20-12.5 mg per tablet Take 1 Tab by mouth two (2) times a day.  atorvastatin (LIPITOR) 20 mg tablet Take 20 mg by mouth daily.  gemfibrozil (LOPID) 600 mg tablet Take 600 mg by mouth two (2) times a day.  metFORMIN (GLUCOPHAGE) 1,000 mg tablet Take 1,000 mg by mouth two (2) times a day. No current facility-administered medications on file prior to visit. Review of Symptoms:    Review of Systems   Constitutional: Negative for chills, fever and weight loss. HENT: Negative for nosebleeds. Eyes: Negative for blurred vision and double vision. Respiratory: Negative for cough, shortness of breath and wheezing. Cardiovascular: Negative for chest pain, palpitations, orthopnea, leg swelling and PND. Skin: Negative for rash. Neurological: Negative for dizziness and loss of consciousness. Physical Exam:      General: Well developed, in no acute distress, cooperative and alert  Heart:  reg rate and rhythm; normal S1/S2; no murmurs, no gallops or rubs. Respiratory: Clear bilaterally x 4, no wheezing or rales  Extremities:  Normal cap refill, no cyanosis, atraumatic. No edema.   Vascular: 2+ pulses symmetric in all extremities    Vitals:    08/25/21 1512   BP: (!) 140/90   BP 1 Location: Right arm   BP Patient Position: Sitting   BP Cuff Size: Adult   Pulse: 74   Resp: 18   Height: 5' 6\" (1.676 m)   Weight: 202 lb 3.2 oz (91.7 kg)   SpO2: 94%       No results found for: CHOL, CHOLPOCT, CHOLX, CHLST, CHOLV, HDL, HDLPOC, HDLP, LDL, LDLCPOC, LDLC, DLDLP, VLDLC, VLDL, TGLX, TRIGL, TRIGP, TGLPOCT, CHHD, CHHDX  Lab Results   Component Value Date/Time    WBC 4.9 03/03/2021 04:29 AM    Hemoglobin (POC) 8.4 (L) 03/01/2021 06:54 AM    HGB 7.3 (L) 03/03/2021 04:29 AM    Hematocrit (POC) 30 (L) 02/19/2019 08:02 AM    HCT 23.7 (L) 03/03/2021 04:29 AM    PLATELET 046 (L) 15/15/0115 04:29 AM    .8 (H) 03/03/2021 04:29 AM     Lab Results   Component Value Date/Time    Sodium 139 03/03/2021 04:29 AM    Potassium 4.1 03/03/2021 04:29 AM    Chloride 112 (H) 03/03/2021 04:29 AM    CO2 22 03/03/2021 04:29 AM    Anion gap 5 03/03/2021 04:29 AM    Glucose 96 03/03/2021 04:29 AM    BUN 18 03/03/2021 04:29 AM    Creatinine 1.19 03/03/2021 04:29 AM    BUN/Creatinine ratio 15 03/03/2021 04:29 AM    GFR est AA >60 03/03/2021 04:29 AM    GFR est non-AA >60 03/03/2021 04:29 AM    Calcium 7.6 (L) 03/03/2021 04:29 AM    Bilirubin, total 0.2 02/24/2021 03:24 PM    Alk. phosphatase 109 02/24/2021 03:24 PM    Protein, total 7.4 02/24/2021 03:24 PM    Albumin 3.0 (L) 03/03/2021 09:45 AM    Globulin 4.0 02/24/2021 03:24 PM    A-G Ratio 0.9 (L) 02/24/2021 03:24 PM    ALT (SGPT) 15 02/24/2021 03:24 PM    AST (SGOT) 8 (L) 02/24/2021 03:24 PM       ECG done today sinus rhythm     Assessment:       ICD-10-CM ICD-9-CM    1. Paroxysmal atrial fibrillation (HCC)  I48.0 427.31 AMB POC EKG ROUTINE W/ 12 LEADS, INTER & REP   2. Essential hypertension  I10 401.9    3. Mixed hyperlipidemia  E78.2 272.2    4. Current smoker  F17.200 305.1    5. Stenosis of left carotid artery  I65.22 433.10         Plan:     1.  Paroxysmal atrial fibrillation (HCC)  Maintaining sinus rhythm today  Event monitor done 3/2021 without recurrent AF  Echo done 2/2019 with preserved LVEF 55-60% without significant valvular pathology  Continue diltiazem, eliqiuis for stroke prevention  Previous hx of acute blood loss anemia, so consider Watchman if he has recurrent bleeding    2. Essential hypertension  BP controlled. Continue anti-hypertensive therapy and low sodium diet    3. Mixed hyperlipidemia  Continue statin therapy and low fat, low cholesterol diet  Lipids managed by PCP    4. Tobacco abuse  Quit!! Congratulated on smoking cessation    5. Carotid artery stenosis  S/p left CEA by Dr. Victoria Das in 3/2021    F/u with Dr. Alayna Muniz in 1 year    Patient seen and examined by me with the above nurse practitioner. I personally performed all components of the history, physical, and medical decision making and agree with the assessment and plan with minor modifications as noted. Today the patient presents stable in sinus rhythm, on diltiazem and Eliquis, having quit smoking after carotid endarterectomy. General PE  Gen:  NAD  Mental Status - Alert. General Appearance - Not in acute distress. HEENT:  PERRL, no carotid bruits or JVD  Chest and Lung Exam   Inspection: Accessory muscles - No use of accessory muscles in breathing. Auscultation:   Breath sounds: - Normal.   Cardiovascular   Inspection: Jugular vein - Bilateral - Inspection Normal.   Palpation/Percussion:   Apical Impulse: - Normal.   Auscultation: Rhythm - Regular. Heart Sounds - S1 WNL and S2 WNL. No S3 or S4. Murmurs & Other Heart Sounds: Auscultation of the heart reveals - No Murmurs. Peripheral Vascular   Upper Extremity: Inspection - Bilateral - No Cyanotic nailbeds or Digital clubbing. Lower Extremity:   Palpation: Edema - Bilateral - No edema. Abdomen:   Soft, non-tender, bowel sounds are active. Neuro: A&O times 3, CN and motor grossly WNL    Continue current management of paroxysmal atrial fibrillation, hypertension, hyperlipidemia. Congratulated on quitting smoking.   Follow up in 1 year, sooner as needed.

## 2021-12-07 NOTE — ANESTHESIA POSTPROCEDURE EVALUATION
Procedure(s): RIGHT TOTAL HIP ARTHROPLASTY ANTERIOR APPROACH, ACETABULAR BONE GRAFTING. Anesthesia Post Evaluation Patient location during evaluation: PACU Note status: Adequate. Level of consciousness: responsive to verbal stimuli and sleepy but conscious Pain management: satisfactory to patient Airway patency: patent Anesthetic complications: no 
Cardiovascular status: acceptable Respiratory status: acceptable Hydration status: acceptable Comments: +Post-Anesthesia Evaluation and Assessment Patient: Sherine Eli MRN: [id number]  SSN: [id number] YOB: 1955  Age: 62 y.o. Sex: male Cardiovascular Function/Vital Signs /50   Pulse 60   Temp 36.6 °C (97.9 °F)   Resp 16   Ht 5' 6\" (1.676 m)   Wt 82.4 kg (181 lb 10.5 oz)   SpO2 93%   BMI 29.32 kg/m² Patient is status post Procedure(s): RIGHT TOTAL HIP ARTHROPLASTY ANTERIOR APPROACH, ACETABULAR BONE GRAFTING. Nausea/Vomiting: Controlled. Postoperative hydration reviewed and adequate. Pain: 
Pain Scale 1: Numeric (0 - 10) (02/11/19 1053) Pain Intensity 1: 0(arouses pavel c/o pain at this point) (02/11/19 1053) Managed. Neurological Status:  
Neuro (WDL): Within Defined Limits (02/11/19 0729) At baseline. Mental Status and Level of Consciousness: Arousable. Pulmonary Status:  
O2 Device: Nasal cannula (02/11/19 1053) Adequate oxygenation and airway patent. Complications related to anesthesia: None Post-anesthesia assessment completed. No concerns. Signed By: Eldon Flores MD  
 2/11/2019 Post anesthesia nausea and vomiting:  controlled Visit Vitals /50 Pulse 60 Temp 36.6 °C (97.9 °F) Resp 16 Ht 5' 6\" (1.676 m) Wt 82.4 kg (181 lb 10.5 oz) SpO2 93% BMI 29.32 kg/m² 07-Dec-2021 02:56:02

## 2022-03-08 RX ORDER — DILTIAZEM HYDROCHLORIDE 180 MG/1
CAPSULE, COATED, EXTENDED RELEASE ORAL
Qty: 90 CAPSULE | Refills: 1 | Status: SHIPPED | OUTPATIENT
Start: 2022-03-08

## 2022-03-18 PROBLEM — Z96.641 HISTORY OF TOTAL RIGHT HIP REPLACEMENT: Status: ACTIVE | Noted: 2019-02-11

## 2022-03-18 PROBLEM — I65.29 CAROTID STENOSIS: Status: ACTIVE | Noted: 2021-03-01

## 2022-03-19 PROBLEM — I48.91 A-FIB (HCC): Status: ACTIVE | Noted: 2019-02-19

## 2022-03-19 PROBLEM — S72.8X1A OTHER FRACTURE OF RIGHT FEMUR, INITIAL ENCOUNTER FOR CLOSED FRACTURE (HCC): Status: ACTIVE | Noted: 2019-02-26

## 2022-03-19 PROBLEM — D64.9 ANEMIA: Status: ACTIVE | Noted: 2020-10-09

## 2022-03-19 PROBLEM — I10 ESSENTIAL HYPERTENSION: Status: ACTIVE | Noted: 2021-08-25

## 2022-03-19 PROBLEM — M97.8XXA PERIPROSTHETIC FRACTURE AROUND INTERNAL PROSTHETIC HIP JOINT: Status: ACTIVE | Noted: 2019-02-19

## 2022-03-19 PROBLEM — K92.2 GI BLEED: Status: ACTIVE | Noted: 2020-10-08

## 2022-03-19 PROBLEM — E78.2 MIXED HYPERLIPIDEMIA: Status: ACTIVE | Noted: 2021-08-25

## 2022-03-19 PROBLEM — Z96.649 PERIPROSTHETIC FRACTURE AROUND INTERNAL PROSTHETIC HIP JOINT: Status: ACTIVE | Noted: 2019-02-19

## 2022-03-19 PROBLEM — F17.200 CURRENT SMOKER: Status: ACTIVE | Noted: 2021-08-25

## 2023-05-24 RX ORDER — GEMFIBROZIL 600 MG/1
600 TABLET, FILM COATED ORAL 2 TIMES DAILY
COMMUNITY

## 2023-05-24 RX ORDER — AMOXICILLIN 250 MG
1 CAPSULE ORAL DAILY
COMMUNITY
Start: 2019-02-12

## 2023-05-24 RX ORDER — TAMSULOSIN HYDROCHLORIDE 0.4 MG/1
CAPSULE ORAL
COMMUNITY
Start: 2021-01-13

## 2023-05-24 RX ORDER — LISINOPRIL AND HYDROCHLOROTHIAZIDE 20; 12.5 MG/1; MG/1
1 TABLET ORAL 2 TIMES DAILY
COMMUNITY

## 2023-05-24 RX ORDER — DILTIAZEM HYDROCHLORIDE 180 MG/1
CAPSULE, EXTENDED RELEASE ORAL
COMMUNITY
Start: 2022-03-08

## 2023-05-24 RX ORDER — ATORVASTATIN CALCIUM 20 MG/1
20 TABLET, FILM COATED ORAL DAILY
COMMUNITY

## 2023-05-24 RX ORDER — FAMOTIDINE 40 MG/1
40 TABLET, FILM COATED ORAL DAILY
COMMUNITY

## 2023-05-24 RX ORDER — LEVOTHYROXINE SODIUM 0.05 MG/1
TABLET ORAL
COMMUNITY

## 2023-05-24 RX ORDER — HYDROCODONE BITARTRATE AND ACETAMINOPHEN 5; 325 MG/1; MG/1
TABLET ORAL
COMMUNITY

## 2023-05-24 RX ORDER — FERROUS SULFATE 325(65) MG
45 TABLET ORAL
COMMUNITY

## 2023-05-24 RX ORDER — ASPIRIN 81 MG/1
81 TABLET ORAL DAILY
COMMUNITY

## 2023-05-24 RX ORDER — PANTOPRAZOLE SODIUM 40 MG/1
40 TABLET, DELAYED RELEASE ORAL DAILY
COMMUNITY

## 2024-07-05 ENCOUNTER — TELEPHONE (OUTPATIENT)
Age: 69
End: 2024-07-05

## 2024-07-05 NOTE — TELEPHONE ENCOUNTER
LM to remind patient of upcoming appt on 7/9 with DR Jenkins at the [facility]

## 2024-10-25 ENCOUNTER — TELEPHONE (OUTPATIENT)
Age: 69
End: 2024-10-25

## 2024-10-25 DIAGNOSIS — I48.91 UNSPECIFIED ATRIAL FIBRILLATION (HCC): Primary | ICD-10-CM

## 2024-10-25 NOTE — TELEPHONE ENCOUNTER
Patient called and secure message left. Echo was schedule for   Future Appointments   Date Time Provider Department Center   10/28/2024 10:30 AM [facility] US 2 SI[facility]   12/24/2024 11:00 AM BS [facility]O ECHO 3 FRANCISCO S[facility]NCHEZ AMB   4/25/2025  9:20 AM En Jenkins MD BS BS AMB     My chart message sent with information as well.

## 2024-10-25 NOTE — TELEPHONE ENCOUNTER
Luli-I forgot to tell you to please schedule echo for this patient indication atrial fibrillation, primary hypertension, hyperlipidemia and any other diagnoses from my office visit.  Please schedule at his convenience.

## 2024-10-28 ENCOUNTER — HOSPITAL ENCOUNTER (OUTPATIENT)
Facility: HOSPITAL | Age: 69
Discharge: HOME | End: 2024-10-31
Attending: INTERNAL MEDICINE
Payer: MEDICARE

## 2024-10-28 DIAGNOSIS — N18.30 STAGE 3 CHRONIC KIDNEY DISEASE, UNSPECIFIED WHETHER STAGE 3A OR 3B CKD (HCC): ICD-10-CM

## 2024-10-28 PROCEDURE — 76770 US EXAM ABDO BACK WALL COMP: CPT

## 2024-12-17 ENCOUNTER — TRANSCRIBE ORDERS (OUTPATIENT)
Facility: HOSPITAL | Age: 69
End: 2024-12-17

## 2024-12-17 DIAGNOSIS — N13.30 HYDRONEPHROSIS, UNSPECIFIED HYDRONEPHROSIS TYPE: Primary | ICD-10-CM

## 2025-01-15 ENCOUNTER — HOSPITAL ENCOUNTER (OUTPATIENT)
Facility: HOSPITAL | Age: 70
Discharge: HOME | End: 2025-01-18
Payer: MEDICARE

## 2025-01-15 DIAGNOSIS — N13.30 HYDRONEPHROSIS, UNSPECIFIED HYDRONEPHROSIS TYPE: ICD-10-CM

## 2025-01-15 LAB — CREAT BLD-MCNC: 1.7 MG/DL (ref 0.6–1.3)

## 2025-01-15 PROCEDURE — 6360000004 HC RX CONTRAST MEDICATION: Performed by: NURSE PRACTITIONER

## 2025-01-15 PROCEDURE — 82565 ASSAY OF CREATININE: CPT

## 2025-01-15 PROCEDURE — 74178 CT ABD&PLV WO CNTR FLWD CNTR: CPT

## 2025-01-15 RX ORDER — IOPAMIDOL 755 MG/ML
100 INJECTION, SOLUTION INTRAVASCULAR
Status: COMPLETED | OUTPATIENT
Start: 2025-01-15 | End: 2025-01-15

## 2025-01-15 RX ADMIN — IOPAMIDOL 100 ML: 755 INJECTION, SOLUTION INTRAVENOUS at 14:06

## 2025-02-24 ENCOUNTER — TELEPHONE (OUTPATIENT)
Age: 70
End: 2025-02-24

## 2025-02-24 NOTE — TELEPHONE ENCOUNTER
----- Message from Dr. En Jenkins MD sent at 2/23/2025  4:59 PM EST -----  Please advise echo shows normal heart function with no significant valve problems.  Follow-up as scheduled.     Future Appointments  4/25/2025  9:20 AM    En Jenkins MD    BS                BS AMB     My chart message sent.

## 2025-03-11 ENCOUNTER — TELEPHONE (OUTPATIENT)
Age: 70
End: 2025-03-11

## 2025-03-11 NOTE — TELEPHONE ENCOUNTER
My chart message sent on 02- not read.    Message from Dr. En Jenkins MD --  Echocardiogram shows normal heart function with no significant valve problems.  Follow-up as scheduled.     Attempted to contact patient unsuccessfully.  Secure message left to patient with  results and recommendations.

## (undated) DEVICE — VASCULAR-RICHMOND-LF: Brand: MEDLINE INDUSTRIES, INC.

## (undated) DEVICE — SPONGE GZ W4XL4IN COT 12 PLY TYP VII WVN C FLD DSGN

## (undated) DEVICE — Device

## (undated) DEVICE — ADHESIVE SKIN CLOSURE 4X22 CM PREMIERPRO EXOFINFUSION DISP

## (undated) DEVICE — PREP KIT PEEL PTCH POVIDONE IOD

## (undated) DEVICE — DRAPE,REIN 53X77,STERILE: Brand: MEDLINE

## (undated) DEVICE — AEGIS 1" DISK 4MM HOLE, PEEL OPEN: Brand: MEDLINE

## (undated) DEVICE — SYR 50ML LR LCK 1ML GRAD NSAF --

## (undated) DEVICE — STERILE POLYISOPRENE POWDER-FREE SURGICAL GLOVES WITH EMOLLIENT COATING: Brand: PROTEXIS

## (undated) DEVICE — TOWEL SURG W17XL27IN STD BLU COT NONFENESTRATED PREWASHED

## (undated) DEVICE — KENDALL SCD EXPRESS SLEEVES, KNEE LENGTH, MEDIUM: Brand: KENDALL SCD

## (undated) DEVICE — Z DISCONTINUED USE 2717541 SUTURE STRATAFIX SZ 3-0 L30CM NONABSORBABLE UD L26MM FS 3/8

## (undated) DEVICE — DRAIN SURG W10MMXL20CM SIL FULL PERF HUBLESS FLAT RADPQ

## (undated) DEVICE — DEVON™ KNEE AND BODY STRAP 60" X 3" (1.5 M X 7.6 CM): Brand: DEVON

## (undated) DEVICE — DRAPE XR C ARM 41X74IN LF --

## (undated) DEVICE — PIN EXT FIX SCHNZ 3 MM

## (undated) DEVICE — Z DISCONTINUED USE 2131664 WIPE INSTR W3XL3IN NONLINTING

## (undated) DEVICE — REM POLYHESIVE ADULT PATIENT RETURN ELECTRODE: Brand: VALLEYLAB

## (undated) DEVICE — SUTURE STRATAFIX SYMMETRIC SZ 1 L18IN ABSRB VLT CT1 L36CM SXPP1A404

## (undated) DEVICE — BLADE ASSEMB CLP HAIR FINE --

## (undated) DEVICE — BOWL UTIL GRAD 32OZ STRL --

## (undated) DEVICE — SUT ETHLN 3-0 18IN PS2 BLK --

## (undated) DEVICE — (D)PREP SKN CHLRAPRP APPL 26ML -- CONVERT TO ITEM 371833

## (undated) DEVICE — FLOSEAL MATRIX IS INDICATED IN SURGICAL PROCEDURES (OTHER THAN IN OPHTHALMIC) AS AN ADJUNCT TO HEMOSTASIS WHEN CONTROL OF BLEEDING BY LIGATURE OR CONVENTIONALPROCEDURES IS INEFFECTIVE OR IMPRACTICAL.: Brand: FLOSEAL HEMOSTATIC MATRIX

## (undated) DEVICE — ELECTRODE BLDE L4IN NONINSULATED EDGE

## (undated) DEVICE — STERILE POLYISOPRENE POWDER-FREE SURGICAL GLOVES: Brand: PROTEXIS

## (undated) DEVICE — INFECTION CONTROL KIT SYS

## (undated) DEVICE — SOL INJ SOD CL 0.9% 500ML BG --

## (undated) DEVICE — SUTURE PROL SZ 5-0 L24IN NONABSORBABLE BLU RB-2 L13IN 1/2 8554H

## (undated) DEVICE — NEEDLE HYPO 25GA L5/8IN ORNG HUB S STL LATCH BVL UP

## (undated) DEVICE — SUTURE MCRYL SZ 4-0 L27IN ABSRB UD L19MM PS-2 1/2 CIR PRIM Y426H

## (undated) DEVICE — SOLUTION IRRIG 1000ML H2O STRL BLT

## (undated) DEVICE — NDL SPNE QNCKE 18GX3.5IN LF --

## (undated) DEVICE — MASTISOL ADHESIVE LIQ 2/3ML

## (undated) DEVICE — WATERPROOF, BACTERIA PROOF DRESSING WITH ABSORBENT SEE THROUGH PAD: Brand: OPSITE POST-OP VISIBLE 20X10CM CTN 20

## (undated) DEVICE — SUT PROL 6-0 24IN BV1 DA BLU --

## (undated) DEVICE — SLIM BODY SKIN STAPLER: Brand: APPOSE ULC

## (undated) DEVICE — 4-PORT MANIFOLD: Brand: NEPTUNE 2

## (undated) DEVICE — OSCILLATING TIP SAW CARTRIDGE: Brand: PRECISION FALCON

## (undated) DEVICE — 1.25MM NON-THREADED GUIDE WIRE 150MM

## (undated) DEVICE — SUTURE VCRL SZ 0 L27IN ABSRB UD L36MM CT-1 1/2 CIR J260H

## (undated) DEVICE — 450 ML BOTTLE OF 0.05% CHLORHEXIDINE GLUCONATE IN 99.95% STERILE WATER FOR IRRIGATION, USP AND APPLICATOR.: Brand: IRRISEPT ANTIMICROBIAL WOUND LAVAGE

## (undated) DEVICE — SOL IRR SOD CL 0.9% 1000ML BTL --

## (undated) DEVICE — SUTURE ABSORBABLE MONOFILAMENT 2-0 WND CLOSURE GRN V-LOC 180 VLOCL0315

## (undated) DEVICE — SMOKE EVACUATION PENCIL: Brand: VALLEYLAB

## (undated) DEVICE — HANDLE LT SNAP ON ULT DURABLE LENS FOR TRUMPF ALC DISPOSABLE

## (undated) DEVICE — AGENT HEMSTAT W4XL4IN OXIDIZED REGENERATED CELOS ABSRB SFT

## (undated) DEVICE — PROBE VASC 8MHZ WTRPRF

## (undated) DEVICE — Z CONVERTED USE 2107985 COVER FLROSCP W36XL28IN 4 SIDE ADH

## (undated) DEVICE — STRAP,POSITIONING,KNEE/BODY,FOAM,4X60": Brand: MEDLINE

## (undated) DEVICE — GOWN,PREVENTION PLUS,XLN/2XL,ST,22/CS: Brand: MEDLINE

## (undated) DEVICE — SOLUTION IV 1000ML 0.9% SOD CHL

## (undated) DEVICE — MAGNETIC INSTRUMENT PAD 10" X 16"; MEDIUM; DISPOSABLE: Brand: CARDINAL HEALTH

## (undated) DEVICE — 3M™ IOBAN™ 2 ANTIMICROBIAL INCISE DRAPE 6651EZ: Brand: IOBAN™ 2

## (undated) DEVICE — NEEDLE HYPO 18GA L1.5IN PNK S STL HUB POLYPR SHLD REG BVL

## (undated) DEVICE — SUT PROL 7-0 18IN BV1 DA BLU --

## (undated) DEVICE — LABEL MED VASC MRMC STRL

## (undated) DEVICE — SUTURE VCRL SZ 3-0 L27IN ABSRB UD L26MM SH 1/2 CIR J416H

## (undated) DEVICE — PREP SKN CHLRAPRP APL 26ML STR --

## (undated) DEVICE — TRAP FLUID BUFFALO FLTR